# Patient Record
Sex: MALE | Race: WHITE | NOT HISPANIC OR LATINO | Employment: OTHER | URBAN - NONMETROPOLITAN AREA
[De-identification: names, ages, dates, MRNs, and addresses within clinical notes are randomized per-mention and may not be internally consistent; named-entity substitution may affect disease eponyms.]

---

## 2023-05-18 RX ORDER — NIFEDIPINE 90 MG/1
1 TABLET, FILM COATED, EXTENDED RELEASE ORAL DAILY
COMMUNITY
End: 2023-06-26 | Stop reason: SDUPTHER

## 2023-05-18 RX ORDER — CHLORTHALIDONE 25 MG/1
TABLET ORAL
COMMUNITY
Start: 2022-11-21 | End: 2023-05-22 | Stop reason: SDUPTHER

## 2023-05-18 RX ORDER — OMEPRAZOLE 20 MG/1
CAPSULE, DELAYED RELEASE ORAL
COMMUNITY
End: 2023-06-26 | Stop reason: SDUPTHER

## 2023-05-18 RX ORDER — LEVOTHYROXINE SODIUM 175 UG/1
1 TABLET ORAL DAILY
COMMUNITY
Start: 2019-11-29 | End: 2023-06-26 | Stop reason: SDUPTHER

## 2023-05-18 RX ORDER — MULTIVITAMIN
TABLET ORAL
COMMUNITY

## 2023-05-18 RX ORDER — ACETAMINOPHEN 500 MG
TABLET ORAL
COMMUNITY

## 2023-05-18 RX ORDER — CALCITRIOL 0.5 UG/1
CAPSULE ORAL
COMMUNITY
Start: 2019-11-29 | End: 2023-06-26 | Stop reason: SDUPTHER

## 2023-05-18 RX ORDER — SIMVASTATIN 40 MG/1
1 TABLET, FILM COATED ORAL DAILY
COMMUNITY
Start: 2019-11-29 | End: 2023-06-26 | Stop reason: SDUPTHER

## 2023-05-18 RX ORDER — PIOGLITAZONEHYDROCHLORIDE 15 MG/1
1 TABLET ORAL DAILY
COMMUNITY
Start: 2022-11-22

## 2023-05-18 RX ORDER — METFORMIN HYDROCHLORIDE 500 MG/1
2 TABLET, EXTENDED RELEASE ORAL DAILY
COMMUNITY
Start: 2022-02-22 | End: 2023-06-26 | Stop reason: SDUPTHER

## 2023-05-18 RX ORDER — CLOPIDOGREL BISULFATE 75 MG/1
1 TABLET ORAL DAILY
COMMUNITY
Start: 2019-11-29 | End: 2023-06-26 | Stop reason: SDUPTHER

## 2023-05-18 RX ORDER — LOSARTAN POTASSIUM 100 MG/1
1 TABLET ORAL DAILY
COMMUNITY
Start: 2019-11-29 | End: 2023-06-26 | Stop reason: SDUPTHER

## 2023-05-22 ENCOUNTER — OFFICE VISIT (OUTPATIENT)
Dept: PRIMARY CARE | Facility: CLINIC | Age: 73
End: 2023-05-22
Payer: MEDICARE

## 2023-05-22 VITALS
BODY MASS INDEX: 23.7 KG/M2 | DIASTOLIC BLOOD PRESSURE: 80 MMHG | HEIGHT: 69 IN | SYSTOLIC BLOOD PRESSURE: 150 MMHG | WEIGHT: 160 LBS | HEART RATE: 77 BPM

## 2023-05-22 DIAGNOSIS — Z00.00 HEALTHCARE MAINTENANCE: ICD-10-CM

## 2023-05-22 DIAGNOSIS — E11.65 TYPE 2 DIABETES MELLITUS WITH HYPERGLYCEMIA, WITHOUT LONG-TERM CURRENT USE OF INSULIN (MULTI): Primary | ICD-10-CM

## 2023-05-22 DIAGNOSIS — E78.49 OTHER HYPERLIPIDEMIA: ICD-10-CM

## 2023-05-22 DIAGNOSIS — Z12.5 ENCOUNTER FOR SCREENING FOR MALIGNANT NEOPLASM OF PROSTATE: ICD-10-CM

## 2023-05-22 DIAGNOSIS — I10 PRIMARY HYPERTENSION: ICD-10-CM

## 2023-05-22 PROCEDURE — 3077F SYST BP >= 140 MM HG: CPT | Performed by: FAMILY MEDICINE

## 2023-05-22 PROCEDURE — 1036F TOBACCO NON-USER: CPT | Performed by: FAMILY MEDICINE

## 2023-05-22 PROCEDURE — 3079F DIAST BP 80-89 MM HG: CPT | Performed by: FAMILY MEDICINE

## 2023-05-22 PROCEDURE — 1159F MED LIST DOCD IN RCRD: CPT | Performed by: FAMILY MEDICINE

## 2023-05-22 PROCEDURE — 4010F ACE/ARB THERAPY RXD/TAKEN: CPT | Performed by: FAMILY MEDICINE

## 2023-05-22 PROCEDURE — 99214 OFFICE O/P EST MOD 30 MIN: CPT | Performed by: FAMILY MEDICINE

## 2023-05-22 PROCEDURE — 1160F RVW MEDS BY RX/DR IN RCRD: CPT | Performed by: FAMILY MEDICINE

## 2023-05-22 RX ORDER — CHLORTHALIDONE 25 MG/1
25 TABLET ORAL DAILY
Qty: 90 TABLET | Refills: 3 | Status: SHIPPED | OUTPATIENT
Start: 2023-05-22 | End: 2023-06-26 | Stop reason: SDUPTHER

## 2023-05-22 RX ORDER — FERROUS SULFATE 325(65) MG
TABLET ORAL
COMMUNITY

## 2023-05-22 ASSESSMENT — ENCOUNTER SYMPTOMS
SHORTNESS OF BREATH: 0
WEAKNESS: 1
PALPITATIONS: 0

## 2023-05-22 ASSESSMENT — PATIENT HEALTH QUESTIONNAIRE - PHQ9
1. LITTLE INTEREST OR PLEASURE IN DOING THINGS: NOT AT ALL
SUM OF ALL RESPONSES TO PHQ9 QUESTIONS 1 AND 2: 0
2. FEELING DOWN, DEPRESSED OR HOPELESS: NOT AT ALL

## 2023-05-22 NOTE — PROGRESS NOTES
"Subjective   Patient ID: Garcia Haskins is a 72 y.o. male who presents for 6 month.    HPI   istory of Present Illness  HTN   States did not start chlorthalidone   Start may 2023     PVD   no claudication states was able to walk around [employer] slowly without getting tired   Had a home assessment which included PAD testing July 2021.. Left foot significant and Right foot severe, patient is already on Clopidogrel and statin therapy.       Patient is a diabetic he is on Metformin nifedipine statin and clopidogrel.   a1c = 6.7% august 2021   lipid profile LDL 89   checks bg twice  a month 165  no low bs reactions     Care through [facility] but has been over 1 year     Review of Systems   Respiratory:  Negative for shortness of breath.    Cardiovascular:  Negative for chest pain and palpitations.   Neurological:  Positive for weakness (getting tired).   No claudication     Objective   /80   Pulse 77   Ht 1.765 m (5' 9.49\")   Wt 72.6 kg (160 lb)   BMI 23.30 kg/m²     Physical Exam  Vitals reviewed.   Constitutional:       Appearance: Normal appearance.   HENT:      Head: Normocephalic and atraumatic.   Eyes:      Conjunctiva/sclera: Conjunctivae normal.   Cardiovascular:      Rate and Rhythm: Normal rate and regular rhythm.   Pulmonary:      Effort: Pulmonary effort is normal.      Breath sounds: Normal breath sounds.   Musculoskeletal:         General: No deformity.      Cervical back: Neck supple.   Skin:     General: Skin is warm and dry.      Capillary Refill: Capillary refill takes less than 2 seconds.   Neurological:      Mental Status: He is alert.      Sensory: Sensory deficit present.     vibratory    Assessment/Plan   Diagnoses and all orders for this visit:  Healthcare maintenance  -     Prostate Specific Antigen, Screen; Future  Encounter for screening for malignant neoplasm of prostate  -     Prostate Specific Antigen, Screen; Future  Primary hypertension  -     chlorthalidone (Hygroton) 25 mg " tablet; Take 1 tablet (25 mg) by mouth once daily.  Type 2 diabetes mellitus with hyperglycemia, without long-term current use of insulin (CMS/[facility])  -     Albumin , Urine Random; Future  -     Creatinine, Urine Random; Future  -     Hemoglobin A1C; Future  -     Lipid Panel; Future  -     Basic Metabolic Panel; Future  -     CBC; Future  Other hyperlipidemia

## 2023-06-26 ENCOUNTER — OFFICE VISIT (OUTPATIENT)
Dept: PRIMARY CARE | Facility: CLINIC | Age: 73
End: 2023-06-26
Payer: MEDICARE

## 2023-06-26 ENCOUNTER — LAB (OUTPATIENT)
Dept: LAB | Facility: LAB | Age: 73
End: 2023-06-26
Payer: MEDICARE

## 2023-06-26 VITALS
SYSTOLIC BLOOD PRESSURE: 136 MMHG | BODY MASS INDEX: 23.28 KG/M2 | HEIGHT: 69 IN | OXYGEN SATURATION: 100 % | HEART RATE: 80 BPM | DIASTOLIC BLOOD PRESSURE: 60 MMHG | WEIGHT: 157.2 LBS

## 2023-06-26 DIAGNOSIS — N18.31 STAGE 3A CHRONIC KIDNEY DISEASE (MULTI): ICD-10-CM

## 2023-06-26 DIAGNOSIS — R55 VASOVAGAL ATTACK: ICD-10-CM

## 2023-06-26 DIAGNOSIS — E78.49 OTHER HYPERLIPIDEMIA: ICD-10-CM

## 2023-06-26 DIAGNOSIS — H90.12 CONDUCTIVE HEARING LOSS OF LEFT EAR, UNSPECIFIED HEARING STATUS ON CONTRALATERAL SIDE: ICD-10-CM

## 2023-06-26 DIAGNOSIS — Z23 IMMUNIZATION DUE: ICD-10-CM

## 2023-06-26 DIAGNOSIS — Z12.5 ENCOUNTER FOR SCREENING FOR MALIGNANT NEOPLASM OF PROSTATE: ICD-10-CM

## 2023-06-26 DIAGNOSIS — D64.9 ANEMIA, UNSPECIFIED TYPE: Primary | ICD-10-CM

## 2023-06-26 DIAGNOSIS — Z00.00 HEALTHCARE MAINTENANCE: ICD-10-CM

## 2023-06-26 DIAGNOSIS — N18.32 ANEMIA DUE TO STAGE 3B CHRONIC KIDNEY DISEASE (MULTI): Primary | ICD-10-CM

## 2023-06-26 DIAGNOSIS — K21.9 GASTROESOPHAGEAL REFLUX DISEASE, UNSPECIFIED WHETHER ESOPHAGITIS PRESENT: ICD-10-CM

## 2023-06-26 DIAGNOSIS — E03.9 ACQUIRED HYPOTHYROIDISM: ICD-10-CM

## 2023-06-26 DIAGNOSIS — J40 BRONCHITIS: ICD-10-CM

## 2023-06-26 DIAGNOSIS — Z00.00 ROUTINE GENERAL MEDICAL EXAMINATION AT HEALTH CARE FACILITY: Primary | ICD-10-CM

## 2023-06-26 DIAGNOSIS — Z13.6 SCREENING FOR AAA (AORTIC ABDOMINAL ANEURYSM): ICD-10-CM

## 2023-06-26 DIAGNOSIS — E11.65 TYPE 2 DIABETES MELLITUS WITH HYPERGLYCEMIA, WITHOUT LONG-TERM CURRENT USE OF INSULIN (MULTI): ICD-10-CM

## 2023-06-26 DIAGNOSIS — I10 PRIMARY HYPERTENSION: Primary | ICD-10-CM

## 2023-06-26 DIAGNOSIS — N25.81 HYPERPARATHYROIDISM DUE TO RENAL INSUFFICIENCY (MULTI): ICD-10-CM

## 2023-06-26 DIAGNOSIS — D63.1 ANEMIA DUE TO STAGE 3B CHRONIC KIDNEY DISEASE (MULTI): Primary | ICD-10-CM

## 2023-06-26 DIAGNOSIS — I10 PRIMARY HYPERTENSION: ICD-10-CM

## 2023-06-26 PROBLEM — K44.9 DIAPHRAGMATIC HERNIA: Status: ACTIVE | Noted: 2023-06-26

## 2023-06-26 LAB
ANION GAP IN SER/PLAS: 13 MMOL/L (ref 10–20)
CALCIUM (MG/DL) IN SER/PLAS: 8.6 MG/DL (ref 8.6–10.3)
CARBON DIOXIDE, TOTAL (MMOL/L) IN SER/PLAS: 31 MMOL/L (ref 21–32)
CHLORIDE (MMOL/L) IN SER/PLAS: 93 MMOL/L (ref 98–107)
CHOLESTEROL (MG/DL) IN SER/PLAS: 148 MG/DL (ref 0–199)
CHOLESTEROL IN HDL (MG/DL) IN SER/PLAS: 41 MG/DL
CHOLESTEROL/HDL RATIO: 3.6
COBALAMIN (VITAMIN B12) (PG/ML) IN SER/PLAS: 422 PG/ML (ref 211–911)
CREATININE (MG/DL) IN SER/PLAS: 1.88 MG/DL (ref 0.5–1.3)
ERYTHROCYTE DISTRIBUTION WIDTH (RATIO) BY AUTOMATED COUNT: 15.1 % (ref 11.5–14.5)
ERYTHROCYTE MEAN CORPUSCULAR HEMOGLOBIN CONCENTRATION (G/DL) BY AUTOMATED: 31.9 G/DL (ref 32–36)
ERYTHROCYTE MEAN CORPUSCULAR VOLUME (FL) BY AUTOMATED COUNT: 79 FL (ref 80–100)
ERYTHROCYTES (10*6/UL) IN BLOOD BY AUTOMATED COUNT: 3.87 X10E12/L (ref 4.5–5.9)
ESTIMATED AVERAGE GLUCOSE FOR HBA1C: 183 MG/DL
FERRITIN (UG/LL) IN SER/PLAS: 248 UG/L (ref 20–300)
GFR MALE: 37 ML/MIN/1.73M2
GLUCOSE (MG/DL) IN SER/PLAS: 192 MG/DL (ref 74–99)
HEMATOCRIT (%) IN BLOOD BY AUTOMATED COUNT: 30.4 % (ref 41–52)
HEMOGLOBIN (G/DL) IN BLOOD: 9.7 G/DL (ref 13.5–17.5)
HEMOGLOBIN A1C/HEMOGLOBIN TOTAL IN BLOOD: 8 %
IRON (UG/DL) IN SER/PLAS: 72 UG/DL (ref 35–150)
IRON BINDING CAPACITY (UG/DL) IN SER/PLAS: 257 UG/DL (ref 240–445)
IRON SATURATION (%) IN SER/PLAS: 28 % (ref 25–45)
LDL: 74 MG/DL (ref 0–99)
LEUKOCYTES (10*3/UL) IN BLOOD BY AUTOMATED COUNT: 11.3 X10E9/L (ref 4.4–11.3)
PLATELETS (10*3/UL) IN BLOOD AUTOMATED COUNT: 465 X10E9/L (ref 150–450)
POTASSIUM (MMOL/L) IN SER/PLAS: 3.3 MMOL/L (ref 3.5–5.3)
PROSTATE SPECIFIC ANTIGEN,SCREEN: 1.99 NG/ML (ref 0–4)
SODIUM (MMOL/L) IN SER/PLAS: 134 MMOL/L (ref 136–145)
TRIGLYCERIDE (MG/DL) IN SER/PLAS: 163 MG/DL (ref 0–149)
UREA NITROGEN (MG/DL) IN SER/PLAS: 30 MG/DL (ref 6–23)
VLDL: 33 MG/DL (ref 0–40)

## 2023-06-26 PROCEDURE — 99214 OFFICE O/P EST MOD 30 MIN: CPT | Performed by: FAMILY MEDICINE

## 2023-06-26 PROCEDURE — 1170F FXNL STATUS ASSESSED: CPT | Performed by: FAMILY MEDICINE

## 2023-06-26 PROCEDURE — 36415 COLL VENOUS BLD VENIPUNCTURE: CPT

## 2023-06-26 PROCEDURE — 85027 COMPLETE CBC AUTOMATED: CPT

## 2023-06-26 PROCEDURE — 83036 HEMOGLOBIN GLYCOSYLATED A1C: CPT

## 2023-06-26 PROCEDURE — G0439 PPPS, SUBSEQ VISIT: HCPCS | Performed by: FAMILY MEDICINE

## 2023-06-26 PROCEDURE — 90677 PCV20 VACCINE IM: CPT | Performed by: FAMILY MEDICINE

## 2023-06-26 PROCEDURE — 80061 LIPID PANEL: CPT

## 2023-06-26 PROCEDURE — 80048 BASIC METABOLIC PNL TOTAL CA: CPT

## 2023-06-26 PROCEDURE — 83550 IRON BINDING TEST: CPT

## 2023-06-26 PROCEDURE — G0009 ADMIN PNEUMOCOCCAL VACCINE: HCPCS | Performed by: FAMILY MEDICINE

## 2023-06-26 PROCEDURE — 82728 ASSAY OF FERRITIN: CPT

## 2023-06-26 PROCEDURE — 82607 VITAMIN B-12: CPT

## 2023-06-26 PROCEDURE — G0103 PSA SCREENING: HCPCS

## 2023-06-26 PROCEDURE — 3075F SYST BP GE 130 - 139MM HG: CPT | Performed by: FAMILY MEDICINE

## 2023-06-26 PROCEDURE — 3078F DIAST BP <80 MM HG: CPT | Performed by: FAMILY MEDICINE

## 2023-06-26 PROCEDURE — 1160F RVW MEDS BY RX/DR IN RCRD: CPT | Performed by: FAMILY MEDICINE

## 2023-06-26 PROCEDURE — 83540 ASSAY OF IRON: CPT

## 2023-06-26 PROCEDURE — 1159F MED LIST DOCD IN RCRD: CPT | Performed by: FAMILY MEDICINE

## 2023-06-26 PROCEDURE — 4010F ACE/ARB THERAPY RXD/TAKEN: CPT | Performed by: FAMILY MEDICINE

## 2023-06-26 PROCEDURE — 1036F TOBACCO NON-USER: CPT | Performed by: FAMILY MEDICINE

## 2023-06-26 RX ORDER — LOSARTAN POTASSIUM 100 MG/1
100 TABLET ORAL DAILY
Qty: 90 TABLET | Refills: 3 | Status: SHIPPED | OUTPATIENT
Start: 2023-06-26 | End: 2024-05-13 | Stop reason: SDUPTHER

## 2023-06-26 RX ORDER — OMEPRAZOLE 20 MG/1
20 CAPSULE, DELAYED RELEASE ORAL DAILY
Qty: 90 CAPSULE | Refills: 3 | Status: SHIPPED | OUTPATIENT
Start: 2023-06-26 | End: 2024-05-13 | Stop reason: SDUPTHER

## 2023-06-26 RX ORDER — CLOPIDOGREL BISULFATE 75 MG/1
75 TABLET ORAL DAILY
Qty: 90 TABLET | Refills: 3 | Status: SHIPPED | OUTPATIENT
Start: 2023-06-26 | End: 2024-05-13 | Stop reason: SDUPTHER

## 2023-06-26 RX ORDER — POTASSIUM CHLORIDE 750 MG/1
10 TABLET, FILM COATED, EXTENDED RELEASE ORAL DAILY
Qty: 90 TABLET | Refills: 1 | Status: SHIPPED | OUTPATIENT
Start: 2023-06-26 | End: 2024-06-25

## 2023-06-26 RX ORDER — AZITHROMYCIN 250 MG/1
TABLET, FILM COATED ORAL
Qty: 6 TABLET | Refills: 0 | Status: SHIPPED | OUTPATIENT
Start: 2023-06-26 | End: 2023-07-01

## 2023-06-26 RX ORDER — CALCITRIOL 0.5 UG/1
0.5 CAPSULE ORAL 3 TIMES WEEKLY
Qty: 36 CAPSULE | Refills: 3 | Status: SHIPPED | OUTPATIENT
Start: 2023-06-26 | End: 2023-12-26 | Stop reason: ALTCHOICE

## 2023-06-26 RX ORDER — CHLORTHALIDONE 25 MG/1
25 TABLET ORAL DAILY
Qty: 90 TABLET | Refills: 3 | Status: SHIPPED | OUTPATIENT
Start: 2023-06-26 | End: 2023-12-26 | Stop reason: ALTCHOICE

## 2023-06-26 RX ORDER — SIMVASTATIN 40 MG/1
40 TABLET, FILM COATED ORAL DAILY
Qty: 90 TABLET | Refills: 3 | Status: SHIPPED | OUTPATIENT
Start: 2023-06-26 | End: 2024-05-13 | Stop reason: SDUPTHER

## 2023-06-26 RX ORDER — NIFEDIPINE 90 MG/1
90 TABLET, FILM COATED, EXTENDED RELEASE ORAL DAILY
Qty: 90 TABLET | Refills: 3 | Status: SHIPPED | OUTPATIENT
Start: 2023-06-26 | End: 2023-12-21 | Stop reason: SDUPTHER

## 2023-06-26 RX ORDER — METFORMIN HYDROCHLORIDE 500 MG/1
1000 TABLET, EXTENDED RELEASE ORAL DAILY
Qty: 180 TABLET | Refills: 3 | Status: SHIPPED | OUTPATIENT
Start: 2023-06-26 | End: 2024-06-25

## 2023-06-26 RX ORDER — LEVOTHYROXINE SODIUM 175 UG/1
175 TABLET ORAL DAILY
Qty: 90 TABLET | Refills: 3 | Status: SHIPPED | OUTPATIENT
Start: 2023-06-26 | End: 2024-05-13 | Stop reason: SDUPTHER

## 2023-06-26 ASSESSMENT — ACTIVITIES OF DAILY LIVING (ADL)
MANAGING_FINANCES: INDEPENDENT
DRESSING: INDEPENDENT
GROCERY_SHOPPING: INDEPENDENT
TAKING_MEDICATION: INDEPENDENT
DOING_HOUSEWORK: INDEPENDENT
BATHING: INDEPENDENT

## 2023-06-26 ASSESSMENT — PATIENT HEALTH QUESTIONNAIRE - PHQ9
1. LITTLE INTEREST OR PLEASURE IN DOING THINGS: NOT AT ALL
2. FEELING DOWN, DEPRESSED OR HOPELESS: NOT AT ALL
SUM OF ALL RESPONSES TO PHQ9 QUESTIONS 1 AND 2: 0

## 2023-06-26 NOTE — PROGRESS NOTES
Subjective   Reason for Visit: Garcia Haskins is an 72 y.o. male here for a Medicare Wellness visit.     Past Medical, Surgical, and Family History reviewed and updated in chart.    Reviewed all medications by prescribing practitioner or clinical pharmacist (such as prescriptions, OTCs, herbal therapies and supplements) and documented in the medical record.    HPI    Ckd 3a      Labs today     nocturia up  to 6   Flomax did not help and does not want to see urology     HTN  good controll with medications     PVD   2022 PAD test borderline/normal    patient is already on Clopidogrel and statin therapy.     Patient is a diabetic he is on Metformin   a1c pending  Last bs was 199 about 2 hours aftger dinner   last eye exam more than 2 yr ago     Former smoker wuit 2002   No longer going to [facility]    H/o cva on plavix     Unable to tolerate asa gi ulcer    Pneumovax 23: Pneumovax 23 vaccine was previously given and 1/29/2019.   Prevnar 13: the patient declines the Prevnar 13 vaccination.   Shingles Vaccine: the patient declines the Shingles vaccination.   Prostate cancer screening: Screening ordered.   Colorectal Cancer Screening: screening is current and 05/26/2021.   Abdominal Aortic Aneurysm screening: Screening ordered.    Patient Care Team:  Ben Disa MD as PCP - General  Ben Dias MD as PCP - [employer] PCP     Review of Systems  Ears plugged on the left  Coughing productive yellow x 2 days no sob no cp     Taking coiricidin      No fever  Sleeping during the day less at night  Low energy     After p20 pt got lightheaded at check out window and bp 90/50  Bs 234   Pt had been fasting for labs  Diaphoretic   Pt taken to exam room and layed down   120/50  Pulse ox normal  Heart rate normal   Pt drank 2 oz gatorade and felt better   Needed to go to bathroom and assisted to bathroom     BM  normal no melena   Pt able to go home   Came with a   Will call with labs   Recheck bp 130/54  "  Pt able to walk to care without assistance     Objective   Vitals:  /60   Pulse 80   Ht 1.765 m (5' 9.49\")   Wt 71.3 kg (157 lb 3.2 oz)   SpO2 100%   BMI 22.89 kg/m²       Physical Exam  Constitutional:       Appearance: Normal appearance.   HENT:      Head: Normocephalic and atraumatic.      Right Ear: Tympanic membrane, ear canal and external ear normal. There is no impacted cerumen.      Left Ear: Ear canal and external ear normal.      Ears:      Comments: Left tm does not look normal cunningham localizes to the left and bc =ac on the left   Eyes:      Conjunctiva/sclera: Conjunctivae normal.      Pupils: Pupils are equal, round, and reactive to light.   Cardiovascular:      Rate and Rhythm: Normal rate and regular rhythm.      Heart sounds: Normal heart sounds.   Pulmonary:      Effort: Pulmonary effort is normal.      Breath sounds: Normal breath sounds.   Lymphadenopathy:      Cervical: No cervical adenopathy.   Skin:     Coloration: Skin is not jaundiced.   Neurological:      General: No focal deficit present.      Mental Status: He is alert and oriented to person, place, and time.   Psychiatric:         Mood and Affect: Mood normal.         Behavior: Behavior normal.         Thought Content: Thought content normal.         Judgment: Judgment normal.         Assessment/Plan   Problem List Items Addressed This Visit       Type 2 diabetes mellitus with hyperglycemia, without long-term current use of insulin (CMS/[facility])    Relevant Medications    metFORMIN XR (Glucophage-XR) 500 mg 24 hr tablet    Primary hypertension    Relevant Medications    chlorthalidone (Hygroton) 25 mg tablet    clopidogrel (Plavix) 75 mg tablet    losartan (Cozaar) 100 mg tablet    NIFEdipine CC (Adalat CC) 90 mg 24 hr tablet    Other hyperlipidemia    Relevant Medications    simvastatin (Zocor) 40 mg tablet    Acquired hypothyroidism    Relevant Medications    levothyroxine (Synthroid, Levoxyl) 175 mcg tablet    " Hyperparathyroidism due to renal insufficiency (CMS/[facility])    Stage 3a chronic kidney disease     Other Visit Diagnoses       Routine general medical examination at health care facility    -  Primary    Gastroesophageal reflux disease, unspecified whether esophagitis present        Relevant Medications    omeprazole (PriLOSEC) 20 mg DR capsule    Healthcare maintenance        Relevant Medications    calcitriol (Rocaltrol) 0.5 mcg capsule    Conductive hearing loss of left ear, unspecified hearing status on contralateral side        Relevant Orders    Referral to ENT    Screening for AAA (aortic abdominal aneurysm)        Relevant Orders    Vascular US abdominal aorta anuerysm AAA screening    Bronchitis        Relevant Medications    azithromycin (Zithromax) 250 mg tablet    Immunization due        Relevant Orders    Pneumococcal conjugate vaccine, 20-valent, adult (PREVNAR 20) (Completed)    Vasovagal attack

## 2023-07-18 ENCOUNTER — LAB (OUTPATIENT)
Dept: LAB | Facility: LAB | Age: 73
End: 2023-07-18
Payer: MEDICARE

## 2023-07-18 DIAGNOSIS — E11.65 TYPE 2 DIABETES MELLITUS WITH HYPERGLYCEMIA, WITHOUT LONG-TERM CURRENT USE OF INSULIN (MULTI): ICD-10-CM

## 2023-07-18 PROCEDURE — 82570 ASSAY OF URINE CREATININE: CPT

## 2023-07-18 PROCEDURE — 82043 UR ALBUMIN QUANTITATIVE: CPT

## 2023-07-19 LAB
ALBUMIN (MG/L) IN URINE: 112.3 MG/L
ALBUMIN/CREATININE (UG/MG) IN URINE: 118.8 UG/MG CRT (ref 0–30)
CREATININE (MG/DL) IN URINE: 94.5 MG/DL (ref 20–370)

## 2023-08-29 LAB
ALANINE AMINOTRANSFERASE (SGPT) (U/L) IN SER/PLAS: 27 U/L (ref 10–52)
ALBUMIN (G/DL) IN SER/PLAS: 4 G/DL (ref 3.4–5)
ALKALINE PHOSPHATASE (U/L) IN SER/PLAS: 214 U/L (ref 33–136)
ANION GAP IN SER/PLAS: 14 MMOL/L (ref 10–20)
APPEARANCE, URINE: CLEAR
ASPARTATE AMINOTRANSFERASE (SGOT) (U/L) IN SER/PLAS: 25 U/L (ref 9–39)
BILIRUBIN TOTAL (MG/DL) IN SER/PLAS: 0.4 MG/DL (ref 0–1.2)
BILIRUBIN, URINE: NEGATIVE
BLOOD, URINE: NEGATIVE
CALCIUM (MG/DL) IN SER/PLAS: 8.3 MG/DL (ref 8.6–10.3)
CARBON DIOXIDE, TOTAL (MMOL/L) IN SER/PLAS: 28 MMOL/L (ref 21–32)
CHLORIDE (MMOL/L) IN SER/PLAS: 97 MMOL/L (ref 98–107)
COLOR, URINE: YELLOW
CREATININE (MG/DL) IN SER/PLAS: 1.81 MG/DL (ref 0.5–1.3)
ERYTHROCYTE DISTRIBUTION WIDTH (RATIO) BY AUTOMATED COUNT: NORMAL
ERYTHROCYTE MEAN CORPUSCULAR HEMOGLOBIN CONCENTRATION (G/DL) BY AUTOMATED: NORMAL
ERYTHROCYTE MEAN CORPUSCULAR VOLUME (FL) BY AUTOMATED COUNT: NORMAL
ERYTHROCYTES (10*6/UL) IN BLOOD BY AUTOMATED COUNT: NORMAL
GFR MALE: 39 ML/MIN/1.73M2
GLUCOSE (MG/DL) IN SER/PLAS: 158 MG/DL (ref 74–99)
GLUCOSE, URINE: ABNORMAL MG/DL
HEMATOCRIT (%) IN BLOOD BY AUTOMATED COUNT: NORMAL
HEMOGLOBIN (G/DL) IN BLOOD: NORMAL
HYALINE CASTS, URINE: ABNORMAL /LPF
KETONES, URINE: NEGATIVE MG/DL
LEUKOCYTE ESTERASE, URINE: NEGATIVE
LEUKOCYTES (10*3/UL) IN BLOOD BY AUTOMATED COUNT: NORMAL
MAGNESIUM (MG/DL) IN SER/PLAS: 2.1 MG/DL (ref 1.6–2.4)
NITRITE, URINE: NEGATIVE
PH, URINE: 6 (ref 5–8)
PHOSPHATE (MG/DL) IN SER/PLAS: 3.9 MG/DL (ref 2.5–4.9)
PLATELETS (10*3/UL) IN BLOOD AUTOMATED COUNT: NORMAL
POTASSIUM (MMOL/L) IN SER/PLAS: 3.8 MMOL/L (ref 3.5–5.3)
PROTEIN TOTAL: 7.2 G/DL (ref 6.4–8.2)
PROTEIN, URINE: ABNORMAL MG/DL
RBC, URINE: <1 /HPF (ref 0–5)
SODIUM (MMOL/L) IN SER/PLAS: 135 MMOL/L (ref 136–145)
SPECIFIC GRAVITY, URINE: 1.01 (ref 1–1.03)
URATE (MG/DL) IN SER/PLAS: 4.8 MG/DL (ref 4–7.5)
UREA NITROGEN (MG/DL) IN SER/PLAS: 40 MG/DL (ref 6–23)
UROBILINOGEN, URINE: <2 MG/DL (ref 0–1.9)
WBC, URINE: 1 /HPF (ref 0–5)

## 2023-08-30 LAB
ALBUMIN (MG/L) IN URINE: 145.2 MG/L
ALBUMIN/CREATININE (UG/MG) IN URINE: 145.3 UG/MG CRT (ref 0–30)
CREATININE (MG/DL) IN URINE: 99.9 MG/DL (ref 20–370)
PARATHYRIN INTACT (PG/ML) IN SER/PLAS: 14.6 PG/ML (ref 18.5–88)

## 2023-10-07 DIAGNOSIS — D63.1 ANEMIA OF CHRONIC RENAL FAILURE, STAGE 3 (MODERATE), UNSPECIFIED WHETHER STAGE 3A OR 3B CKD (MULTI): ICD-10-CM

## 2023-10-07 DIAGNOSIS — N18.30 ANEMIA OF CHRONIC RENAL FAILURE, STAGE 3 (MODERATE), UNSPECIFIED WHETHER STAGE 3A OR 3B CKD (MULTI): ICD-10-CM

## 2023-10-10 ENCOUNTER — LAB (OUTPATIENT)
Dept: LAB | Facility: LAB | Age: 73
End: 2023-10-10
Payer: MEDICARE

## 2023-10-10 ENCOUNTER — APPOINTMENT (OUTPATIENT)
Dept: LAB | Facility: LAB | Age: 73
End: 2023-10-10
Payer: MEDICARE

## 2023-10-10 DIAGNOSIS — N18.30 STAGE 3 CHRONIC KIDNEY DISEASE, UNSPECIFIED WHETHER STAGE 3A OR 3B CKD (MULTI): ICD-10-CM

## 2023-10-10 DIAGNOSIS — D63.1 ANEMIA OF CHRONIC RENAL FAILURE, STAGE 3 (MODERATE), UNSPECIFIED WHETHER STAGE 3A OR 3B CKD (MULTI): ICD-10-CM

## 2023-10-10 DIAGNOSIS — N18.30 ANEMIA OF CHRONIC RENAL FAILURE, STAGE 3 (MODERATE), UNSPECIFIED WHETHER STAGE 3A OR 3B CKD (MULTI): ICD-10-CM

## 2023-10-10 PROBLEM — I10 HTN (HYPERTENSION): Status: ACTIVE | Noted: 2023-10-10

## 2023-10-10 PROBLEM — Z86.73 HISTORY OF STROKE WITHOUT RESIDUAL DEFICITS: Status: ACTIVE | Noted: 2023-10-10

## 2023-10-10 PROBLEM — I10 BENIGN HYPERTENSION: Status: ACTIVE | Noted: 2023-10-10

## 2023-10-10 PROBLEM — E11.9 DIABETES MELLITUS (MULTI): Status: ACTIVE | Noted: 2023-10-10

## 2023-10-10 PROBLEM — E78.5 HYPERLIPIDEMIA: Status: ACTIVE | Noted: 2023-10-10

## 2023-10-10 PROBLEM — K30 INDIGESTION: Status: ACTIVE | Noted: 2023-10-10

## 2023-10-10 PROBLEM — D64.9 ANEMIA: Status: ACTIVE | Noted: 2023-10-10

## 2023-10-10 PROBLEM — E78.00 HYPERCHOLESTEROLEMIA: Status: ACTIVE | Noted: 2023-10-10

## 2023-10-10 PROBLEM — D63.8 ANEMIA OF CHRONIC DISORDER: Status: ACTIVE | Noted: 2023-10-10

## 2023-10-10 PROBLEM — E11.65 CONTROLLED TYPE 2 DIABETES MELLITUS WITH HYPERGLYCEMIA, WITHOUT LONG-TERM CURRENT USE OF INSULIN (MULTI): Status: ACTIVE | Noted: 2023-10-10

## 2023-10-10 PROBLEM — K63.5 BENIGN COLONIC POLYP: Status: ACTIVE | Noted: 2023-10-10

## 2023-10-10 PROBLEM — N18.9 CKD (CHRONIC KIDNEY DISEASE): Status: ACTIVE | Noted: 2023-10-10

## 2023-10-10 PROBLEM — R35.1 NOCTURIA: Status: ACTIVE | Noted: 2023-10-10

## 2023-10-10 PROBLEM — G47.30 SLEEP APNEA: Status: ACTIVE | Noted: 2023-10-10

## 2023-10-10 PROBLEM — I73.9 PERIPHERAL VASCULAR DISEASE (CMS-HCC): Status: ACTIVE | Noted: 2023-10-10

## 2023-10-10 PROBLEM — E03.9 HYPOTHYROIDISM: Status: ACTIVE | Noted: 2023-10-10

## 2023-10-10 PROBLEM — E20.9 HYPOPARATHYROIDISM (MULTI): Status: ACTIVE | Noted: 2023-10-10

## 2023-10-10 LAB
ALBUMIN SERPL BCP-MCNC: 3.9 G/DL (ref 3.4–5)
ALP SERPL-CCNC: 280 U/L (ref 33–136)
ALT SERPL W P-5'-P-CCNC: 25 U/L (ref 10–52)
ANION GAP SERPL CALC-SCNC: 15 MMOL/L (ref 10–20)
AST SERPL W P-5'-P-CCNC: 24 U/L (ref 9–39)
BILIRUB SERPL-MCNC: 0.3 MG/DL (ref 0–1.2)
BUN SERPL-MCNC: 32 MG/DL (ref 6–23)
CALCIUM SERPL-MCNC: 6.9 MG/DL (ref 8.6–10.3)
CHLORIDE SERPL-SCNC: 103 MMOL/L (ref 98–107)
CO2 SERPL-SCNC: 25 MMOL/L (ref 21–32)
CREAT SERPL-MCNC: 1.64 MG/DL (ref 0.5–1.3)
GFR SERPL CREATININE-BSD FRML MDRD: 44 ML/MIN/1.73M*2
GLUCOSE SERPL-MCNC: 201 MG/DL (ref 74–99)
POTASSIUM SERPL-SCNC: 4.2 MMOL/L (ref 3.5–5.3)
PROT SERPL-MCNC: 7.5 G/DL (ref 6.4–8.2)
SODIUM SERPL-SCNC: 139 MMOL/L (ref 136–145)

## 2023-10-10 PROCEDURE — 36415 COLL VENOUS BLD VENIPUNCTURE: CPT

## 2023-10-10 PROCEDURE — 80053 COMPREHEN METABOLIC PANEL: CPT

## 2023-10-10 RX ORDER — PRENATAL VIT CALC,IRON,FOLIC
1 TABLET ORAL DAILY
COMMUNITY
Start: 2020-02-05

## 2023-10-10 RX ORDER — CALCIUM CARBONATE 500(1250)
2 TABLET ORAL DAILY PRN
COMMUNITY
Start: 2020-02-05

## 2023-10-10 RX ORDER — EMPAGLIFLOZIN 10 MG/1
TABLET, FILM COATED ORAL
COMMUNITY
Start: 2023-09-06 | End: 2023-12-18 | Stop reason: SDUPTHER

## 2023-10-11 ENCOUNTER — APPOINTMENT (OUTPATIENT)
Dept: HEMATOLOGY/ONCOLOGY | Facility: CLINIC | Age: 73
End: 2023-10-11
Payer: MEDICARE

## 2023-10-11 ENCOUNTER — OFFICE VISIT (OUTPATIENT)
Dept: HEMATOLOGY/ONCOLOGY | Facility: CLINIC | Age: 73
End: 2023-10-11
Payer: MEDICARE

## 2023-10-11 ENCOUNTER — INFUSION (OUTPATIENT)
Dept: HEMATOLOGY/ONCOLOGY | Facility: CLINIC | Age: 73
End: 2023-10-11
Payer: MEDICARE

## 2023-10-11 VITALS
HEIGHT: 69 IN | WEIGHT: 160.94 LBS | SYSTOLIC BLOOD PRESSURE: 166 MMHG | BODY MASS INDEX: 23.84 KG/M2 | RESPIRATION RATE: 18 BRPM | OXYGEN SATURATION: 100 % | HEART RATE: 64 BPM | DIASTOLIC BLOOD PRESSURE: 61 MMHG

## 2023-10-11 DIAGNOSIS — N18.30 STAGE 3 CHRONIC KIDNEY DISEASE, UNSPECIFIED WHETHER STAGE 3A OR 3B CKD (MULTI): Primary | ICD-10-CM

## 2023-10-11 DIAGNOSIS — N18.30 STAGE 3 CHRONIC KIDNEY DISEASE, UNSPECIFIED WHETHER STAGE 3A OR 3B CKD (MULTI): ICD-10-CM

## 2023-10-11 DIAGNOSIS — D64.9 ANEMIA, UNSPECIFIED TYPE: ICD-10-CM

## 2023-10-11 LAB
ALBUMIN SERPL BCP-MCNC: 4 G/DL (ref 3.4–5)
ANION GAP SERPL CALC-SCNC: 12 MMOL/L (ref 10–20)
BUN SERPL-MCNC: 29 MG/DL (ref 6–23)
CALCIUM SERPL-MCNC: 6.6 MG/DL (ref 8.6–10.3)
CHLORIDE SERPL-SCNC: 102 MMOL/L (ref 98–107)
CO2 SERPL-SCNC: 28 MMOL/L (ref 21–32)
CREAT SERPL-MCNC: 1.85 MG/DL (ref 0.5–1.3)
ERYTHROCYTE [DISTWIDTH] IN BLOOD BY AUTOMATED COUNT: 15.5 % (ref 11.5–14.5)
FERRITIN SERPL-MCNC: 37 NG/ML (ref 20–300)
FERRITIN SERPL-MCNC: 42 NG/ML (ref 20–300)
GFR SERPL CREATININE-BSD FRML MDRD: 38 ML/MIN/1.73M*2
GLUCOSE SERPL-MCNC: 122 MG/DL (ref 74–99)
HCT VFR BLD AUTO: 34.3 % (ref 41–52)
HGB BLD-MCNC: 10.5 G/DL (ref 13.5–17.5)
IRON SATN MFR SERPL: 9 % (ref 25–45)
IRON SATN MFR SERPL: 9 % (ref 25–45)
IRON SERPL-MCNC: 29 UG/DL (ref 35–150)
IRON SERPL-MCNC: 29 UG/DL (ref 35–150)
MCH RBC QN AUTO: 23.2 PG (ref 26–34)
MCHC RBC AUTO-ENTMCNC: 30.6 G/DL (ref 32–36)
MCV RBC AUTO: 76 FL (ref 80–100)
NRBC BLD-RTO: 0 /100 WBCS (ref 0–0)
PHOSPHATE SERPL-MCNC: 4.3 MG/DL (ref 2.5–4.9)
PLATELET # BLD AUTO: 398 X10*3/UL (ref 150–450)
PMV BLD AUTO: 9.7 FL (ref 7.5–11.5)
POTASSIUM SERPL-SCNC: 4.2 MMOL/L (ref 3.5–5.3)
RBC # BLD AUTO: 4.52 X10*6/UL (ref 4.5–5.9)
SODIUM SERPL-SCNC: 138 MMOL/L (ref 136–145)
TIBC SERPL-MCNC: 320 UG/DL (ref 240–445)
TIBC SERPL-MCNC: 321 UG/DL (ref 240–445)
UIBC SERPL-MCNC: 291 UG/DL (ref 110–370)
UIBC SERPL-MCNC: 292 UG/DL (ref 110–370)
VIT B12 SERPL-MCNC: 459 PG/ML (ref 211–911)
WBC # BLD AUTO: 8.1 X10*3/UL (ref 4.4–11.3)

## 2023-10-11 PROCEDURE — 85027 COMPLETE CBC AUTOMATED: CPT | Performed by: INTERNAL MEDICINE

## 2023-10-11 PROCEDURE — 4010F ACE/ARB THERAPY RXD/TAKEN: CPT | Performed by: INTERNAL MEDICINE

## 2023-10-11 PROCEDURE — 99214 OFFICE O/P EST MOD 30 MIN: CPT | Performed by: INTERNAL MEDICINE

## 2023-10-11 PROCEDURE — 2500000004 HC RX 250 GENERAL PHARMACY W/ HCPCS (ALT 636 FOR OP/ED): Mod: JZ | Performed by: INTERNAL MEDICINE

## 2023-10-11 PROCEDURE — 96372 THER/PROPH/DIAG INJ SC/IM: CPT

## 2023-10-11 PROCEDURE — 80069 RENAL FUNCTION PANEL: CPT | Performed by: INTERNAL MEDICINE

## 2023-10-11 PROCEDURE — 3078F DIAST BP <80 MM HG: CPT | Performed by: INTERNAL MEDICINE

## 2023-10-11 PROCEDURE — 1160F RVW MEDS BY RX/DR IN RCRD: CPT | Performed by: INTERNAL MEDICINE

## 2023-10-11 PROCEDURE — 82728 ASSAY OF FERRITIN: CPT | Performed by: INTERNAL MEDICINE

## 2023-10-11 PROCEDURE — 36415 COLL VENOUS BLD VENIPUNCTURE: CPT | Performed by: INTERNAL MEDICINE

## 2023-10-11 PROCEDURE — 83550 IRON BINDING TEST: CPT | Performed by: INTERNAL MEDICINE

## 2023-10-11 PROCEDURE — 82607 VITAMIN B-12: CPT | Performed by: INTERNAL MEDICINE

## 2023-10-11 PROCEDURE — 82668 ASSAY OF ERYTHROPOIETIN: CPT | Performed by: INTERNAL MEDICINE

## 2023-10-11 PROCEDURE — 1126F AMNT PAIN NOTED NONE PRSNT: CPT | Performed by: INTERNAL MEDICINE

## 2023-10-11 PROCEDURE — 1036F TOBACCO NON-USER: CPT | Performed by: INTERNAL MEDICINE

## 2023-10-11 PROCEDURE — 1159F MED LIST DOCD IN RCRD: CPT | Performed by: INTERNAL MEDICINE

## 2023-10-11 PROCEDURE — 3052F HG A1C>EQUAL 8.0%<EQUAL 9.0%: CPT | Performed by: INTERNAL MEDICINE

## 2023-10-11 PROCEDURE — 3077F SYST BP >= 140 MM HG: CPT | Performed by: INTERNAL MEDICINE

## 2023-10-11 PROCEDURE — 96372 THER/PROPH/DIAG INJ SC/IM: CPT | Mod: JZ | Performed by: INTERNAL MEDICINE

## 2023-10-11 PROCEDURE — 83550 IRON BINDING TEST: CPT | Mod: 59 | Performed by: INTERNAL MEDICINE

## 2023-10-11 RX ORDER — EPINEPHRINE 0.3 MG/.3ML
0.3 INJECTION SUBCUTANEOUS EVERY 5 MIN PRN
Status: CANCELLED | OUTPATIENT
Start: 2023-10-11

## 2023-10-11 RX ORDER — DIPHENHYDRAMINE HYDROCHLORIDE 50 MG/ML
50 INJECTION INTRAMUSCULAR; INTRAVENOUS AS NEEDED
Status: DISCONTINUED | OUTPATIENT
Start: 2023-10-11 | End: 2024-01-04 | Stop reason: HOSPADM

## 2023-10-11 RX ORDER — METHYLPREDNISOLONE SODIUM SUCCINATE 40 MG/ML
40 INJECTION INTRAMUSCULAR; INTRAVENOUS AS NEEDED
Status: CANCELLED | OUTPATIENT
Start: 2023-10-11

## 2023-10-11 RX ORDER — ALBUTEROL SULFATE 0.83 MG/ML
3 SOLUTION RESPIRATORY (INHALATION) AS NEEDED
Status: CANCELLED | OUTPATIENT
Start: 2023-10-25

## 2023-10-11 RX ORDER — DIPHENHYDRAMINE HYDROCHLORIDE 50 MG/ML
50 INJECTION INTRAMUSCULAR; INTRAVENOUS AS NEEDED
Status: CANCELLED | OUTPATIENT
Start: 2023-10-11

## 2023-10-11 RX ORDER — FAMOTIDINE 10 MG/ML
20 INJECTION INTRAVENOUS ONCE AS NEEDED
Status: DISCONTINUED | OUTPATIENT
Start: 2023-10-11 | End: 2024-01-04 | Stop reason: HOSPADM

## 2023-10-11 RX ORDER — EPINEPHRINE 0.3 MG/.3ML
0.3 INJECTION SUBCUTANEOUS EVERY 5 MIN PRN
Status: DISCONTINUED | OUTPATIENT
Start: 2023-10-11 | End: 2024-01-04 | Stop reason: HOSPADM

## 2023-10-11 RX ORDER — FAMOTIDINE 10 MG/ML
20 INJECTION INTRAVENOUS ONCE AS NEEDED
Status: CANCELLED | OUTPATIENT
Start: 2023-10-11

## 2023-10-11 RX ORDER — DIPHENHYDRAMINE HYDROCHLORIDE 50 MG/ML
50 INJECTION INTRAMUSCULAR; INTRAVENOUS AS NEEDED
Status: CANCELLED | OUTPATIENT
Start: 2023-10-25

## 2023-10-11 RX ORDER — FAMOTIDINE 10 MG/ML
20 INJECTION INTRAVENOUS ONCE AS NEEDED
Status: CANCELLED | OUTPATIENT
Start: 2023-10-25

## 2023-10-11 RX ORDER — ALBUTEROL SULFATE 0.83 MG/ML
3 SOLUTION RESPIRATORY (INHALATION) AS NEEDED
Status: DISCONTINUED | OUTPATIENT
Start: 2023-10-11 | End: 2024-01-04 | Stop reason: HOSPADM

## 2023-10-11 RX ORDER — EPINEPHRINE 0.3 MG/.3ML
0.3 INJECTION SUBCUTANEOUS EVERY 5 MIN PRN
Status: CANCELLED | OUTPATIENT
Start: 2023-10-25

## 2023-10-11 RX ORDER — ALBUTEROL SULFATE 0.83 MG/ML
3 SOLUTION RESPIRATORY (INHALATION) AS NEEDED
Status: CANCELLED | OUTPATIENT
Start: 2023-10-11

## 2023-10-11 RX ADMIN — DARBEPOETIN ALFA 300 MCG: 300 INJECTION, SOLUTION INTRAVENOUS; SUBCUTANEOUS at 15:21

## 2023-10-11 ASSESSMENT — PAIN SCALES - GENERAL: PAINLEVEL: 0-NO PAIN

## 2023-10-11 NOTE — PATIENT INSTRUCTIONS
Reviewed labs and recent medical history.  Discussed his labs and noted that he needs a repeat CBC with diff in prep for his Aranesp. Order placed.  Okay to give Aranesp today if Hgb meets the order criteria.  Continue Aranesp as ordered.  Return to see MD in 3 months.

## 2023-10-11 NOTE — PROGRESS NOTES
Pt had stat labs drawn today. Waiting on results for aranesp. Continue labs every 2 weeks at [address] [facility] on Tuesdays. Aranesp injections set up on Wed's. Set up for MD visit 1/3

## 2023-10-11 NOTE — PROGRESS NOTES
Patient ID:Garcia Haskins is a 72 y.o. year old male patient with anemia related to chronic kidney disease and history of gastrointestinal bleeding.      Referring Physician: No referring provider defined for this encounter.   Primary Care Provider: Ben Dias MD    Chief Complaint  Chief Complaint   Patient presents with    Anemia   He is here today in follow-up      History of the Present Illness    1999.  History of upper GI bleed from bleeding ulcer according to the patient.  He is having the same time that he had a cerebrovascular accident with right-sided numbness.  Note from Dr. Dias said that the bleeding resolved off aspirin but he was continued on Plavix.    1/24/2020.  He had a visit with primary care physician Dr. Dias who [facility]  12/2017 hemoglobin was 11.7.  10/2019 hemoglobin was 12.1 with normal iron levels.  He was started iron and was taking it occasionally.  He was continued on statin.  Was noted his next colonoscopy was scheduled for 2021.  Patient had been diabetic.  Hemoglobin A1c in January 2020 was 6.8.    8/2/2021.  Abnormal ankle-brachial indices with significant calf pain.  Hemoglobin A1c August 2021 was 6.7.    6/26/2023.  Follow-up with Dr. Dias in his office.  Patient was given a pneumococcal vaccine and felt lightheaded.  Blood pressure was 90/50.  He was given fluids.  His blood sugar was checked and was 234.  The patient has been fasting for labs and was diaphoretic.  On recheck his blood pressure came up to 130/54.  CBC was abnormal.  White count was 11.3 with hemoglobin 9.7 hematocrit 30.4 with an MCV of 79 MCH has not been MCHC is 31.9 and platelet count was 465,000.  White blood cell differential count was not done.  Hemoglobin A1c that day was 8.0.  Serum chemistry showed a glucose of 192.  Sodium was 134 potassium 3.3 with a BUN of 30 and creatinine of 1.88.    7/20/2023.  Repeat laboratory data continues to be abnormal with a sugar of  335 sodium of 131 potassium 3.3 BUN of 36 and creatinine 2.25.  Calcium was 8.5.  CBC showed a white count of 5.9 with hemoglobin 8.8 hematocrit 28.0 and a platelet count of 403,000.  Blood cell differential count showed 87% polys, 9 lymphs, 2 monos, 2 eosinophils with mild hypochromia.  Serum protein electrophoresis was normal.  Ig kappa free light chains were 5.2 with IgG lambda free light chains of 4.56.    8/10/2023.  CBC showed a white count of 8.3 with hemoglobin 8.5 hematocrit 26.6 and a platelet count of 399,000.    8/22/2023.  Visit with Dr. Lu who felt the patient had significant anemia with low erythropoietin level and CKD predicting that he would benefit from an erythropoietin stimulating agent.  Have plans to start him on Aranesp 300 mcg subcu every 2 weeks x 3 as long as his hemoglobin was less than 10 and return in 6 weeks.  He was counseled to stay hydrated.    8/29/2023.  CBC showed a white count of 14.8 hemoglobin 9 hematocrit 28.5 and a platelet count of 431,000.  Sodium was 135 BUN was 40 creatinine 1.81 with a calcium of 8.3.    9/6/2023.  Seen by Dr. Mauricio Rachel of the nephrology.  Diagnoses were CKD, anemia, hypertension and hypercholesterolemia.  He knows the patient's baseline appears to be 1.84 creatinine.  He had diabetes with microalbuminuria and an SGLT2 inhibitor was added.  [facility]  Dr. Rachel noted right renal cyst, atrophic right kidney anemia of chronic disease with type 2 diabetes and microalbuminuria hypertension hyperlipidemia and hyponatremia which had improved after stopping the thiazide diuretic.    9/12/2023.  CBC showed a hemoglobin of 9.6 hematocrit 31.5    9/26/2023.  CBC showed a hemoglobin of 10.0 hematocrit 33.7      Comorbidities include cerebrovascular accident in 1999 with right-sided numbness.  History of bleeding ulcer in 1999, diabetes mellitus, hypercholesterolemia, thyroid disorder, hypertension and colon polyps.  He has a  surgical history of colonoscopy, tonsillectomy adenoidectomy esophagogastro duodenoscopy and colonoscopy as well as surgery on his wrist.  He has a diaphragmatic hernia and has sleep apnea history    Interval Note  10/11/2023.  He tells me he feels fine.  His appetite is good and he has gained a couple pounds.  He is physically active.  He said he sleeps all right he is not aware of any lumps or bumps.  He has no pain.  He has not seen any bleeding.  Blood pressure today is good at 166/61.  He has undergone repeat colonoscopy and had polypectomy.  He says that he was told to have another colonoscopy in about 3 years.  He has a history of a bleeding ulcer back in 1999.  This was around the time that he had his stroke causing right-sided numbness.    Mr. Haskins says that he has a fairly good appetite.  He says that he eats everything.  He eats red meat 4-5 times a week.  He takes a multiple vitamin daily and thinks that it has iron in it.  I told him to check to make sure that he did because he has been shown to be iron deficient in the past.    Monitoring Parameters    CBCs, iron stores, BMP, stools for occult blood    Review of Systems - Oncology   Review of Systems   Constitutional:  Negative for activity change, appetite change, chills, fatigue and fever.   HENT:  Negative for mouth sores, nosebleeds and trouble swallowing.    Respiratory:  Negative for shortness of breath.    Cardiovascular:  Negative for chest pain.   Gastrointestinal:  Negative for abdominal pain, blood in stool and nausea.   Genitourinary:  Negative for hematuria.   Musculoskeletal: Negative.    Skin: Negative.    Neurological:  Negative for headaches.   Hematological:  Negative for adenopathy.        Past Medical History  Past Medical History:   Diagnosis Date    Personal history of colonic polyps     History of colonic polyps    Personal history of other diseases of the digestive system     History of upper gastrointestinal hemorrhage     Personal history of other diseases of the nervous system and sense organs     History of peripheral neuropathy    Personal history of transient ischemic attack (TIA), and cerebral infarction without residual deficits 01/27/2022    History of cerebrovascular accident        Social History  Social History     Socioeconomic History    Marital status:      Spouse name: Not on file    Number of children: Not on file    Years of education: Not on file    Highest education level: Not on file   Occupational History    Not on file   Tobacco Use    Smoking status: Former     Types: Cigarettes    Smokeless tobacco: Never   Substance and Sexual Activity    Alcohol use: Not Currently    Drug use: Never    Sexual activity: Not on file   Other Topics Concern    Not on file   Social History Narrative    Not on file     Social Determinants of Health     Financial Resource Strain: Not on file   Food Insecurity: Not on file   Transportation Needs: Not on file   Physical Activity: Not on file   Stress: Not on file   Social Connections: Not on file   Intimate Partner Violence: Not on file   Housing Stability: Not on file   He smoked a pack and 1/2 to 2 packs of cigarettes a day for 36 years.  Previously stated he was not interested in lung cancer screening.    Family History  family history includes Stroke in his maternal grandmother and mother; cardiac disorder in his father and mother.     Current Medications  Current Outpatient Medications   Medication Instructions    acetaminophen (Tylenol) 500 mg tablet oral    calcitriol (ROCALTROL) 0.5 mcg, oral, 3 times weekly    calcium carbonate (Oscal) 500 mg calcium (1,250 mg) tablet 2 tablets, oral, Daily PRN    calcium citrate-vitamin D3 200 mg-6.25 mcg (250 unit) tablet 1 tablet, oral, Daily    chlorthalidone (HYGROTON) 25 mg, oral, Daily    CHOLECALCIFEROL, VITAMIN D3, ORAL oral, Daily, SHYANNE UNKNOWN STRENGTH    clopidogrel (PLAVIX) 75 mg, oral, Daily    ferrous sulfate 325 (65 Fe)  MG tablet oral    Jardiance 10 mg     levothyroxine (SYNTHROID, LEVOXYL) 175 mcg, oral, Daily    losartan (COZAAR) 100 mg, oral, Daily    metFORMIN XR (GLUCOPHAGE-XR) 1,000 mg, oral, Daily    multivitamin tablet oral    NIFEdipine ER (ADALAT CC) 90 mg, oral, Daily    omeprazole (PRILOSEC) 20 mg, oral, Daily    pioglitazone (Actos) 15 mg tablet 1 tablet, oral, Daily    potassium chloride CR (Klor-Con) 10 mEq ER tablet 10 mEq, oral, Daily, Do not crush, chew, or split.    simvastatin (ZOCOR) 40 mg, oral, Daily           OARRS Review  I have personally reviewed the OARRS report for Garcia Haskins. I have considered the risks of abuse, dependence, addiction and diversion.  No evidence or concern no stimulants sedatives or opioids have been ordered.    Vital signs  Blood pressure 166/61, pulse 64 and regular, respirations 18, pulse ox on room air 100% with a weight of 160 pounds.  Physical Exam  Vitals and nursing note reviewed. Exam conducted with a chaperone present.   Constitutional:       General: He is not in acute distress.     Appearance: Normal appearance. He is normal weight. He is not ill-appearing or toxic-appearing.      Comments: He is a well-developed well-nourished  male who is in no acute distress.  He does not appear to be acutely or chronically ill.  He does not appear to be pale.   HENT:      Head: Normocephalic and atraumatic.      Nose: Nose normal.      Mouth/Throat:      Mouth: Mucous membranes are moist.      Pharynx: Oropharynx is clear. No oropharyngeal exudate or posterior oropharyngeal erythema.      Comments: Upper and lower dentures.  Eyes:      General: No scleral icterus.     Extraocular Movements: Extraocular movements intact.      Conjunctiva/sclera: Conjunctivae normal.      Pupils: Pupils are equal, round, and reactive to light.   Cardiovascular:      Rate and Rhythm: Normal rate and regular rhythm.      Heart sounds: No murmur heard.  Pulmonary:      Effort: Pulmonary  effort is normal. No respiratory distress.      Breath sounds: Normal breath sounds. No wheezing or rales.   Abdominal:      General: There is no distension.      Palpations: Abdomen is soft. There is no mass.      Tenderness: There is no abdominal tenderness. There is no guarding or rebound.   Musculoskeletal:         General: Normal range of motion.      Cervical back: Normal range of motion and neck supple. No rigidity.      Right lower leg: No edema.      Left lower leg: No edema.   Lymphadenopathy:      Cervical: No cervical adenopathy.   Skin:     General: Skin is warm and dry.      Coloration: Skin is not jaundiced.   Neurological:      General: No focal deficit present.      Mental Status: He is alert and oriented to person, place, and time. Mental status is at baseline.      Cranial Nerves: No cranial nerve deficit.      Motor: No weakness.      Gait: Gait normal.   Psychiatric:         Mood and Affect: Mood normal.         Behavior: Behavior normal.         Thought Content: Thought content normal.         Judgment: Judgment normal.        Current Laboratory Data  10/10/2023.  CBC shows white count of 8.1 with hemoglobin 10.5 hematocrit 34.3 with an MCV of 76 MCH of 23 MCHC of 30.6.  BUN was 32 and creatinine was 1.64.  On repeat BUN was 29 creatinine was 1.85.  Calcium was low at 6.6.  Albumin was normal at 4.  Liver function tests are normal with the exception of an alk phosphatase of 280.  Ferritin was 42 folate was 7.2.  Iron continues to be low at 29 with a saturation of only 9%.  B12 level was normal at 459 erythropoietin level was 20.  eGFR was 38.      Recent Imaging    7/31/2023.  Renal ultrasound showed that the right kidney was smaller than the left.  There was a 7 mm cyst in the upper pole of the right kidney.  There is no evidence of hydronephrosis.  Bladder appeared to be normal.    Performance Status:  Asymptomatic    Assessment/Plan      1.  Anemia related to anemia of chronic disease, which  is his renal compromise and also to iron deficiency anemia related to previous gastrointestinal blood loss.  He has received Aranesp injections and is on multiple vitamin.  I suggested to him that he check to make sure that his multiple vitamin has iron in it to make sure that his stools have been repleted.  His iron saturation is still low.  This should be rechecked again in about 3 months to make sure that it is improving.  He may need iron infusions.  He has had a nice response of his blood count which continues to improve and needs to be checked on a regular basis.  He will continue his Aranesp as long as his blood count parameters are appropriate.  Blood pressure today was 166/61.  We encouraged him to monitor his blood pressures at home.    2.  Chronic kidney disease.  He is under the care of Dr. Mauricio Rachel of nephrology.    3.  Multiple comorbidities.  Comorbidities include cerebrovascular accident in 1999 with right-sided numbness.  History of bleeding ulcer in 1999, diabetes mellitus, hypercholesterolemia, thyroid disorder, hypertension and colon polyps.  He has a surgical history of colonoscopy, tonsillectomy adenoidectomy esophagogastro duodenoscopy and colonoscopy as well as surgery on his wrist.  He has a diaphragmatic hernia and has sleep apnea history.  Certainly his anemia of chronic disease could be related to his hypertension, diabetes, CKD and other disorders.    He will return to the office in about 3 months time for follow-up.  He knows to call in the interim should he have any change in his status, questions or concerns.      Maira Smith MD

## 2023-10-13 LAB — EPO SERPL-ACNC: 20 MU/ML (ref 4–27)

## 2023-10-14 ASSESSMENT — ENCOUNTER SYMPTOMS
FATIGUE: 0
HEMATURIA: 0
BLOOD IN STOOL: 0
APPETITE CHANGE: 0
TROUBLE SWALLOWING: 0
SHORTNESS OF BREATH: 0
CHILLS: 0
ADENOPATHY: 0
ACTIVITY CHANGE: 0
HEADACHES: 0
MUSCULOSKELETAL NEGATIVE: 1
ABDOMINAL PAIN: 0
FEVER: 0
NAUSEA: 0

## 2023-10-24 ENCOUNTER — LAB (OUTPATIENT)
Dept: LAB | Facility: LAB | Age: 73
End: 2023-10-24
Payer: MEDICARE

## 2023-10-24 DIAGNOSIS — N18.30 STAGE 3 CHRONIC KIDNEY DISEASE, UNSPECIFIED WHETHER STAGE 3A OR 3B CKD (MULTI): ICD-10-CM

## 2023-10-24 LAB
BASOPHILS # BLD AUTO: 0.07 X10*3/UL (ref 0–0.1)
BASOPHILS NFR BLD AUTO: 1 %
EOSINOPHIL # BLD AUTO: 0.17 X10*3/UL (ref 0–0.4)
EOSINOPHIL NFR BLD AUTO: 2.4 %
ERYTHROCYTE [DISTWIDTH] IN BLOOD BY AUTOMATED COUNT: 17.2 % (ref 11.5–14.5)
HCT VFR BLD AUTO: 35.6 % (ref 41–52)
HGB BLD-MCNC: 10.5 G/DL (ref 13.5–17.5)
IMM GRANULOCYTES # BLD AUTO: 0.02 X10*3/UL (ref 0–0.5)
IMM GRANULOCYTES NFR BLD AUTO: 0.3 % (ref 0–0.9)
LYMPHOCYTES # BLD AUTO: 0.71 X10*3/UL (ref 0.8–3)
LYMPHOCYTES NFR BLD AUTO: 10 %
MCH RBC QN AUTO: 23 PG (ref 26–34)
MCHC RBC AUTO-ENTMCNC: 29.5 G/DL (ref 32–36)
MCV RBC AUTO: 78 FL (ref 80–100)
MONOCYTES # BLD AUTO: 0.5 X10*3/UL (ref 0.05–0.8)
MONOCYTES NFR BLD AUTO: 7 %
NEUTROPHILS # BLD AUTO: 5.65 X10*3/UL (ref 1.6–5.5)
NEUTROPHILS NFR BLD AUTO: 79.3 %
NRBC BLD-RTO: 0 /100 WBCS (ref 0–0)
PLATELET # BLD AUTO: 479 X10*3/UL (ref 150–450)
PMV BLD AUTO: 10.4 FL (ref 7.5–11.5)
RBC # BLD AUTO: 4.57 X10*6/UL (ref 4.5–5.9)
WBC # BLD AUTO: 7.1 X10*3/UL (ref 4.4–11.3)

## 2023-10-24 PROCEDURE — 85025 COMPLETE CBC W/AUTO DIFF WBC: CPT

## 2023-10-24 PROCEDURE — 36415 COLL VENOUS BLD VENIPUNCTURE: CPT

## 2023-10-25 ENCOUNTER — INFUSION (OUTPATIENT)
Dept: HEMATOLOGY/ONCOLOGY | Facility: CLINIC | Age: 73
End: 2023-10-25
Payer: MEDICARE

## 2023-10-25 VITALS
HEART RATE: 74 BPM | RESPIRATION RATE: 16 BRPM | SYSTOLIC BLOOD PRESSURE: 155 MMHG | BODY MASS INDEX: 23.91 KG/M2 | OXYGEN SATURATION: 99 % | DIASTOLIC BLOOD PRESSURE: 67 MMHG | WEIGHT: 161.6 LBS | TEMPERATURE: 97.5 F

## 2023-10-25 DIAGNOSIS — N18.30 STAGE 3 CHRONIC KIDNEY DISEASE, UNSPECIFIED WHETHER STAGE 3A OR 3B CKD (MULTI): ICD-10-CM

## 2023-10-25 PROCEDURE — 96372 THER/PROPH/DIAG INJ SC/IM: CPT | Performed by: INTERNAL MEDICINE

## 2023-10-25 PROCEDURE — 2500000004 HC RX 250 GENERAL PHARMACY W/ HCPCS (ALT 636 FOR OP/ED): Mod: JZ | Performed by: INTERNAL MEDICINE

## 2023-10-25 PROCEDURE — 96372 THER/PROPH/DIAG INJ SC/IM: CPT

## 2023-10-25 RX ORDER — FAMOTIDINE 10 MG/ML
20 INJECTION INTRAVENOUS ONCE AS NEEDED
OUTPATIENT
Start: 2023-11-08

## 2023-10-25 RX ORDER — EPINEPHRINE 0.3 MG/.3ML
0.3 INJECTION SUBCUTANEOUS EVERY 5 MIN PRN
OUTPATIENT
Start: 2023-11-08

## 2023-10-25 RX ORDER — DIPHENHYDRAMINE HYDROCHLORIDE 50 MG/ML
50 INJECTION INTRAMUSCULAR; INTRAVENOUS AS NEEDED
OUTPATIENT
Start: 2023-11-08

## 2023-10-25 RX ORDER — ALBUTEROL SULFATE 0.83 MG/ML
3 SOLUTION RESPIRATORY (INHALATION) AS NEEDED
OUTPATIENT
Start: 2023-11-08

## 2023-10-25 RX ADMIN — DARBEPOETIN ALFA 300 MCG: 300 INJECTION, SOLUTION INTRAVENOUS; SUBCUTANEOUS at 13:46

## 2023-10-25 ASSESSMENT — PAIN SCALES - GENERAL: PAINLEVEL: 0-NO PAIN

## 2023-11-07 ENCOUNTER — LAB (OUTPATIENT)
Dept: LAB | Facility: LAB | Age: 73
End: 2023-11-07
Payer: MEDICARE

## 2023-11-07 DIAGNOSIS — N18.30 STAGE 3 CHRONIC KIDNEY DISEASE, UNSPECIFIED WHETHER STAGE 3A OR 3B CKD (MULTI): ICD-10-CM

## 2023-11-07 LAB
BASOPHILS # BLD AUTO: 0.08 X10*3/UL (ref 0–0.1)
BASOPHILS NFR BLD AUTO: 1.2 %
EOSINOPHIL # BLD AUTO: 0.17 X10*3/UL (ref 0–0.4)
EOSINOPHIL NFR BLD AUTO: 2.6 %
ERYTHROCYTE [DISTWIDTH] IN BLOOD BY AUTOMATED COUNT: 18.6 % (ref 11.5–14.5)
HCT VFR BLD AUTO: 40.5 % (ref 41–52)
HGB BLD-MCNC: 11.9 G/DL (ref 13.5–17.5)
IMM GRANULOCYTES # BLD AUTO: 0.01 X10*3/UL (ref 0–0.5)
IMM GRANULOCYTES NFR BLD AUTO: 0.2 % (ref 0–0.9)
LYMPHOCYTES # BLD AUTO: 0.9 X10*3/UL (ref 0.8–3)
LYMPHOCYTES NFR BLD AUTO: 13.7 %
MCH RBC QN AUTO: 22.8 PG (ref 26–34)
MCHC RBC AUTO-ENTMCNC: 29.4 G/DL (ref 32–36)
MCV RBC AUTO: 78 FL (ref 80–100)
MONOCYTES # BLD AUTO: 0.63 X10*3/UL (ref 0.05–0.8)
MONOCYTES NFR BLD AUTO: 9.6 %
NEUTROPHILS # BLD AUTO: 4.78 X10*3/UL (ref 1.6–5.5)
NEUTROPHILS NFR BLD AUTO: 72.7 %
NRBC BLD-RTO: 0 /100 WBCS (ref 0–0)
PLATELET # BLD AUTO: 418 X10*3/UL (ref 150–450)
RBC # BLD AUTO: 5.21 X10*6/UL (ref 4.5–5.9)
WBC # BLD AUTO: 6.6 X10*3/UL (ref 4.4–11.3)

## 2023-11-07 PROCEDURE — 85025 COMPLETE CBC W/AUTO DIFF WBC: CPT

## 2023-11-07 PROCEDURE — 36415 COLL VENOUS BLD VENIPUNCTURE: CPT

## 2023-11-22 ENCOUNTER — APPOINTMENT (OUTPATIENT)
Dept: HEMATOLOGY/ONCOLOGY | Facility: CLINIC | Age: 73
End: 2023-11-22
Payer: MEDICARE

## 2023-11-28 ENCOUNTER — LAB (OUTPATIENT)
Dept: LAB | Facility: LAB | Age: 73
End: 2023-11-28
Payer: MEDICARE

## 2023-11-28 DIAGNOSIS — N18.30 STAGE 3 CHRONIC KIDNEY DISEASE, UNSPECIFIED WHETHER STAGE 3A OR 3B CKD (MULTI): ICD-10-CM

## 2023-11-28 LAB
BASOPHILS # BLD AUTO: 0.15 X10*3/UL (ref 0–0.1)
BASOPHILS NFR BLD AUTO: 1.4 %
EOSINOPHIL # BLD AUTO: 0.56 X10*3/UL (ref 0–0.4)
EOSINOPHIL NFR BLD AUTO: 5.2 %
ERYTHROCYTE [DISTWIDTH] IN BLOOD BY AUTOMATED COUNT: 17.1 % (ref 11.5–14.5)
HCT VFR BLD AUTO: 42.8 % (ref 41–52)
HGB BLD-MCNC: 12.7 G/DL (ref 13.5–17.5)
IMM GRANULOCYTES # BLD AUTO: 0.03 X10*3/UL (ref 0–0.5)
IMM GRANULOCYTES NFR BLD AUTO: 0.3 % (ref 0–0.9)
LYMPHOCYTES # BLD AUTO: 1.36 X10*3/UL (ref 0.8–3)
LYMPHOCYTES NFR BLD AUTO: 12.6 %
MCH RBC QN AUTO: 22.8 PG (ref 26–34)
MCHC RBC AUTO-ENTMCNC: 29.7 G/DL (ref 32–36)
MCV RBC AUTO: 77 FL (ref 80–100)
MONOCYTES # BLD AUTO: 0.69 X10*3/UL (ref 0.05–0.8)
MONOCYTES NFR BLD AUTO: 6.4 %
NEUTROPHILS # BLD AUTO: 7.99 X10*3/UL (ref 1.6–5.5)
NEUTROPHILS NFR BLD AUTO: 74.1 %
NRBC BLD-RTO: 0 /100 WBCS (ref 0–0)
PLATELET # BLD AUTO: 462 X10*3/UL (ref 150–450)
RBC # BLD AUTO: 5.57 X10*6/UL (ref 4.5–5.9)
WBC # BLD AUTO: 10.8 X10*3/UL (ref 4.4–11.3)

## 2023-11-28 PROCEDURE — 36415 COLL VENOUS BLD VENIPUNCTURE: CPT

## 2023-11-28 PROCEDURE — 85025 COMPLETE CBC W/AUTO DIFF WBC: CPT

## 2023-11-30 DIAGNOSIS — N18.31 STAGE 3A CHRONIC KIDNEY DISEASE (MULTI): ICD-10-CM

## 2023-11-30 DIAGNOSIS — E61.1 IRON DEFICIENCY: Primary | ICD-10-CM

## 2023-12-05 ENCOUNTER — APPOINTMENT (OUTPATIENT)
Dept: NEPHROLOGY | Facility: CLINIC | Age: 73
End: 2023-12-05
Payer: MEDICARE

## 2023-12-05 ENCOUNTER — LAB (OUTPATIENT)
Dept: LAB | Facility: LAB | Age: 73
End: 2023-12-05
Payer: MEDICARE

## 2023-12-05 DIAGNOSIS — E61.1 IRON DEFICIENCY: ICD-10-CM

## 2023-12-05 DIAGNOSIS — N18.31 STAGE 3A CHRONIC KIDNEY DISEASE (MULTI): ICD-10-CM

## 2023-12-05 DIAGNOSIS — N18.30 STAGE 3 CHRONIC KIDNEY DISEASE, UNSPECIFIED WHETHER STAGE 3A OR 3B CKD (MULTI): ICD-10-CM

## 2023-12-05 LAB
ANION GAP SERPL CALC-SCNC: 12 MMOL/L (ref 10–20)
BASOPHILS # BLD AUTO: 0.12 X10*3/UL (ref 0–0.1)
BASOPHILS NFR BLD AUTO: 1.2 %
BUN SERPL-MCNC: 19 MG/DL (ref 6–23)
CALCIUM SERPL-MCNC: 6.3 MG/DL (ref 8.6–10.3)
CHLORIDE SERPL-SCNC: 100 MMOL/L (ref 98–107)
CO2 SERPL-SCNC: 29 MMOL/L (ref 21–32)
CREAT SERPL-MCNC: 1.42 MG/DL (ref 0.5–1.3)
EOSINOPHIL # BLD AUTO: 0.53 X10*3/UL (ref 0–0.4)
EOSINOPHIL NFR BLD AUTO: 5.3 %
ERYTHROCYTE [DISTWIDTH] IN BLOOD BY AUTOMATED COUNT: 17.1 % (ref 11.5–14.5)
GFR SERPL CREATININE-BSD FRML MDRD: 52 ML/MIN/1.73M*2
GLUCOSE SERPL-MCNC: 151 MG/DL (ref 74–99)
HCT VFR BLD AUTO: 39.2 % (ref 41–52)
HGB BLD-MCNC: 11.8 G/DL (ref 13.5–17.5)
IMM GRANULOCYTES # BLD AUTO: 0.02 X10*3/UL (ref 0–0.5)
IMM GRANULOCYTES NFR BLD AUTO: 0.2 % (ref 0–0.9)
IRON SATN MFR SERPL: 24 % (ref 25–45)
IRON SERPL-MCNC: 64 UG/DL (ref 35–150)
LYMPHOCYTES # BLD AUTO: 0.93 X10*3/UL (ref 0.8–3)
LYMPHOCYTES NFR BLD AUTO: 9.3 %
MCH RBC QN AUTO: 23 PG (ref 26–34)
MCHC RBC AUTO-ENTMCNC: 30.1 G/DL (ref 32–36)
MCV RBC AUTO: 76 FL (ref 80–100)
MONOCYTES # BLD AUTO: 0.75 X10*3/UL (ref 0.05–0.8)
MONOCYTES NFR BLD AUTO: 7.5 %
NEUTROPHILS # BLD AUTO: 7.61 X10*3/UL (ref 1.6–5.5)
NEUTROPHILS NFR BLD AUTO: 76.5 %
NRBC BLD-RTO: 0.6 /100 WBCS (ref 0–0)
PLATELET # BLD AUTO: 339 X10*3/UL (ref 150–450)
POTASSIUM SERPL-SCNC: 3.4 MMOL/L (ref 3.5–5.3)
RBC # BLD AUTO: 5.13 X10*6/UL (ref 4.5–5.9)
SODIUM SERPL-SCNC: 138 MMOL/L (ref 136–145)
TIBC SERPL-MCNC: 268 UG/DL (ref 240–445)
UIBC SERPL-MCNC: 204 UG/DL (ref 110–370)
WBC # BLD AUTO: 10 X10*3/UL (ref 4.4–11.3)

## 2023-12-05 PROCEDURE — 36415 COLL VENOUS BLD VENIPUNCTURE: CPT

## 2023-12-05 PROCEDURE — 85025 COMPLETE CBC W/AUTO DIFF WBC: CPT

## 2023-12-05 PROCEDURE — 80048 BASIC METABOLIC PNL TOTAL CA: CPT

## 2023-12-05 PROCEDURE — 83550 IRON BINDING TEST: CPT

## 2023-12-05 PROCEDURE — 83540 ASSAY OF IRON: CPT

## 2023-12-06 ENCOUNTER — APPOINTMENT (OUTPATIENT)
Dept: HEMATOLOGY/ONCOLOGY | Facility: CLINIC | Age: 73
End: 2023-12-06
Payer: MEDICARE

## 2023-12-06 ENCOUNTER — OFFICE VISIT (OUTPATIENT)
Dept: NEPHROLOGY | Facility: CLINIC | Age: 73
End: 2023-12-06
Payer: MEDICARE

## 2023-12-06 VITALS
WEIGHT: 162.6 LBS | BODY MASS INDEX: 24.08 KG/M2 | SYSTOLIC BLOOD PRESSURE: 144 MMHG | DIASTOLIC BLOOD PRESSURE: 76 MMHG | HEART RATE: 82 BPM | HEIGHT: 69 IN

## 2023-12-06 DIAGNOSIS — N18.31 STAGE 3A CHRONIC KIDNEY DISEASE (MULTI): Primary | ICD-10-CM

## 2023-12-06 DIAGNOSIS — I10 PRIMARY HYPERTENSION: ICD-10-CM

## 2023-12-06 DIAGNOSIS — E11.65 CONTROLLED TYPE 2 DIABETES MELLITUS WITH HYPERGLYCEMIA, WITHOUT LONG-TERM CURRENT USE OF INSULIN (MULTI): ICD-10-CM

## 2023-12-06 DIAGNOSIS — D63.8 ANEMIA OF CHRONIC DISORDER: ICD-10-CM

## 2023-12-06 PROCEDURE — 99213 OFFICE O/P EST LOW 20 MIN: CPT | Performed by: INTERNAL MEDICINE

## 2023-12-06 PROCEDURE — 3052F HG A1C>EQUAL 8.0%<EQUAL 9.0%: CPT | Performed by: INTERNAL MEDICINE

## 2023-12-06 PROCEDURE — 1160F RVW MEDS BY RX/DR IN RCRD: CPT | Performed by: INTERNAL MEDICINE

## 2023-12-06 PROCEDURE — 1036F TOBACCO NON-USER: CPT | Performed by: INTERNAL MEDICINE

## 2023-12-06 PROCEDURE — 3078F DIAST BP <80 MM HG: CPT | Performed by: INTERNAL MEDICINE

## 2023-12-06 PROCEDURE — 3077F SYST BP >= 140 MM HG: CPT | Performed by: INTERNAL MEDICINE

## 2023-12-06 PROCEDURE — 3066F NEPHROPATHY DOC TX: CPT | Performed by: INTERNAL MEDICINE

## 2023-12-06 PROCEDURE — 1126F AMNT PAIN NOTED NONE PRSNT: CPT | Performed by: INTERNAL MEDICINE

## 2023-12-06 PROCEDURE — 4010F ACE/ARB THERAPY RXD/TAKEN: CPT | Performed by: INTERNAL MEDICINE

## 2023-12-06 PROCEDURE — 1159F MED LIST DOCD IN RCRD: CPT | Performed by: INTERNAL MEDICINE

## 2023-12-06 NOTE — PROGRESS NOTES
Subjective   Feels well  Measures BP at home occasionally.    Runs 140+145  Patient ID: Garcia Haskins is a 73 y.o. male who presents for Follow-up (3 month ck/Review labs).  HPI  He is here for follow-up secondary to chronic kidney disease stage III with a baseline creatinine of about 1.8 has an atrophic right kidney with anemia of chronic disease type 2 diabetes hypertension and microalbuminuria.    Labs were drawn just yesterday.  Iron studies show a low percent saturation and his iron level was 64  Basic metabolic panel shows a potassium of 3.4 BUN is 19 with a creatinine of 1.42 with an estimated GFR of 52 calcium is 6.3  His hemoglobin is good at 11.8  Medications are reviewed he is on calcitriol calcium chlorthalidone Plavix iron Jardiance losartan metformin nifedipine PPI potassium and a statin  Blood pressure here in the office is 144/76  Review of Systems   All other systems reviewed and are negative.      Objective   Physical Exam  Constitutional:       Appearance: Normal appearance.   HENT:      Head: Normocephalic and atraumatic.      Right Ear: External ear normal.      Left Ear: External ear normal.      Nose: Nose normal.      Mouth/Throat:      Mouth: Mucous membranes are moist.      Pharynx: Oropharynx is clear.   Eyes:      Extraocular Movements: Extraocular movements intact.      Conjunctiva/sclera: Conjunctivae normal.      Pupils: Pupils are equal, round, and reactive to light.   Cardiovascular:      Rate and Rhythm: Normal rate and regular rhythm.   Pulmonary:      Effort: Pulmonary effort is normal.      Breath sounds: Normal breath sounds.   Abdominal:      General: Abdomen is flat.      Palpations: Abdomen is soft.   Skin:     General: Skin is warm and dry.   Neurological:      General: No focal deficit present.      Mental Status: He is alert and oriented to person, place, and time.   Psychiatric:         Mood and Affect: Mood normal.         Behavior: Behavior normal.          Assessment/Plan   Problem List Items Addressed This Visit             ICD-10-CM    Anemia of chronic disorder D63.8    CKD (chronic kidney disease) - Primary N18.9     Baseline creatinine in the past at 1.8.  His recent creatinine is improved from that at 1.5  Has underlying diabetes and hypertension  Also has microalbuminuria         Relevant Orders    Basic metabolic panel    PTH, intact    Urinalysis with Reflex Microscopic    Albumin, urine, random    Follow Up In Nephrology    Controlled type 2 diabetes mellitus with hyperglycemia, without long-term current use of insulin (CMS/[facility]) E11.65    HTN (hypertension) I10     Goal blood pressures less than 130/80  Blood pressure still not at goal  Has resistant hypertension        Bp not yet at goal  However feels well and Renal function looks good.  In past had Hyponatremia with Thiazide so have to be careful.    Get UA and albumin at next check.  If stable will leave.         CKD 3 with baseline creatinine about 1.8  Right renal cyst  Atrophic right kidney  Anemia of chronic disease  Type 2 diabetes  Microalbuminuria  Hypertension: Resistant  Hyperlipidemia  Hypokalemia  Hypocalcemia    Mauricio Rachel DO 12/06/23 9:31 AM

## 2023-12-06 NOTE — ASSESSMENT & PLAN NOTE
Baseline creatinine in the past at 1.8.  His recent creatinine is improved from that at 1.5  Has underlying diabetes and hypertension  Also has microalbuminuria

## 2023-12-06 NOTE — ASSESSMENT & PLAN NOTE
Goal blood pressures less than 130/80  Blood pressure still not at goal  Has resistant hypertension

## 2023-12-18 DIAGNOSIS — E11.65 TYPE 2 DIABETES MELLITUS WITH HYPERGLYCEMIA, WITHOUT LONG-TERM CURRENT USE OF INSULIN (MULTI): ICD-10-CM

## 2023-12-18 NOTE — TELEPHONE ENCOUNTER
Medication Refill Request    Medication:  empagliflozin    Pharmacy:  [facility]    Last OV:  06/26/23  Upcoming appointment:  Appt needed    ORDER PENDED

## 2023-12-20 ENCOUNTER — TELEPHONE (OUTPATIENT)
Dept: HEMATOLOGY/ONCOLOGY | Facility: CLINIC | Age: 73
End: 2023-12-20
Payer: MEDICARE

## 2023-12-20 ENCOUNTER — APPOINTMENT (OUTPATIENT)
Dept: HEMATOLOGY/ONCOLOGY | Facility: CLINIC | Age: 73
End: 2023-12-20
Payer: MEDICARE

## 2023-12-20 NOTE — TELEPHONE ENCOUNTER
Patient called after not showing up for scheduled labs and Aranesp inj if meets parameters. Patient explains that he didn't feel well and has been staying home. This nurse suggested he reschedule for his lab work when he feels better and it was emphasized that his labs would need to be watched even if he did not qualify for an injection but was still low HGB. He voiced understanding. He repeated the hours the lab would be open at the [place] location.

## 2023-12-21 ENCOUNTER — LAB (OUTPATIENT)
Dept: LAB | Facility: LAB | Age: 73
End: 2023-12-21
Payer: MEDICARE

## 2023-12-21 ENCOUNTER — TELEPHONE (OUTPATIENT)
Dept: PRIMARY CARE | Facility: CLINIC | Age: 73
End: 2023-12-21
Payer: MEDICARE

## 2023-12-21 DIAGNOSIS — N18.30 STAGE 3 CHRONIC KIDNEY DISEASE, UNSPECIFIED WHETHER STAGE 3A OR 3B CKD (MULTI): ICD-10-CM

## 2023-12-21 DIAGNOSIS — I10 PRIMARY HYPERTENSION: ICD-10-CM

## 2023-12-21 LAB
BASOPHILS # BLD AUTO: 0.04 X10*3/UL (ref 0–0.1)
BASOPHILS NFR BLD AUTO: 0.3 %
EOSINOPHIL # BLD AUTO: 0.39 X10*3/UL (ref 0–0.4)
EOSINOPHIL NFR BLD AUTO: 3.4 %
ERYTHROCYTE [DISTWIDTH] IN BLOOD BY AUTOMATED COUNT: 17 % (ref 11.5–14.5)
HCT VFR BLD AUTO: 35.4 % (ref 41–52)
HGB BLD-MCNC: 10.7 G/DL (ref 13.5–17.5)
IMM GRANULOCYTES # BLD AUTO: 0.04 X10*3/UL (ref 0–0.5)
IMM GRANULOCYTES NFR BLD AUTO: 0.3 % (ref 0–0.9)
LYMPHOCYTES # BLD AUTO: 1.23 X10*3/UL (ref 0.8–3)
LYMPHOCYTES NFR BLD AUTO: 10.6 %
MCH RBC QN AUTO: 23.7 PG (ref 26–34)
MCHC RBC AUTO-ENTMCNC: 30.2 G/DL (ref 32–36)
MCV RBC AUTO: 78 FL (ref 80–100)
MONOCYTES # BLD AUTO: 0.8 X10*3/UL (ref 0.05–0.8)
MONOCYTES NFR BLD AUTO: 6.9 %
NEUTROPHILS # BLD AUTO: 9.06 X10*3/UL (ref 1.6–5.5)
NEUTROPHILS NFR BLD AUTO: 78.5 %
NRBC BLD-RTO: 0 /100 WBCS (ref 0–0)
PLATELET # BLD AUTO: 411 X10*3/UL (ref 150–450)
RBC # BLD AUTO: 4.52 X10*6/UL (ref 4.5–5.9)
WBC # BLD AUTO: 11.6 X10*3/UL (ref 4.4–11.3)

## 2023-12-21 PROCEDURE — 36415 COLL VENOUS BLD VENIPUNCTURE: CPT

## 2023-12-21 PROCEDURE — 85025 COMPLETE CBC W/AUTO DIFF WBC: CPT

## 2023-12-21 RX ORDER — NIFEDIPINE 90 MG/1
90 TABLET, EXTENDED RELEASE ORAL DAILY
Qty: 90 TABLET | Refills: 0 | Status: SHIPPED | OUTPATIENT
Start: 2023-12-21 | End: 2024-03-21 | Stop reason: SDUPTHER

## 2023-12-21 NOTE — TELEPHONE ENCOUNTER
Please send refill to [facility]  NIFEdipine CC (Adalat CC) 90 mg 24 hr tablet [[id number]]    Order Details  Dose: 90 mg Route: oral Frequency: Daily   Dispense Quantity: 90 tablet Refills: 3          Sig: Take 1 tablet (90 mg) by mouth once daily.

## 2023-12-26 ENCOUNTER — LAB (OUTPATIENT)
Dept: LAB | Facility: LAB | Age: 73
End: 2023-12-26
Payer: MEDICARE

## 2023-12-26 ENCOUNTER — OFFICE VISIT (OUTPATIENT)
Dept: PRIMARY CARE | Facility: CLINIC | Age: 73
End: 2023-12-26
Payer: MEDICARE

## 2023-12-26 VITALS
WEIGHT: 157.7 LBS | SYSTOLIC BLOOD PRESSURE: 142 MMHG | OXYGEN SATURATION: 98 % | HEART RATE: 74 BPM | BODY MASS INDEX: 23.29 KG/M2 | DIASTOLIC BLOOD PRESSURE: 64 MMHG | TEMPERATURE: 97.8 F

## 2023-12-26 DIAGNOSIS — E11.65 TYPE 2 DIABETES MELLITUS WITH HYPERGLYCEMIA, WITHOUT LONG-TERM CURRENT USE OF INSULIN (MULTI): ICD-10-CM

## 2023-12-26 DIAGNOSIS — J06.9 VIRAL UPPER RESPIRATORY TRACT INFECTION: Primary | ICD-10-CM

## 2023-12-26 DIAGNOSIS — Z23 ENCOUNTER FOR ADMINISTRATION OF VACCINE: ICD-10-CM

## 2023-12-26 DIAGNOSIS — K21.9 GASTROESOPHAGEAL REFLUX DISEASE, UNSPECIFIED WHETHER ESOPHAGITIS PRESENT: ICD-10-CM

## 2023-12-26 LAB
EST. AVERAGE GLUCOSE BLD GHB EST-MCNC: 174 MG/DL
HBA1C MFR BLD: 7.7 %

## 2023-12-26 PROCEDURE — 1036F TOBACCO NON-USER: CPT | Performed by: FAMILY MEDICINE

## 2023-12-26 PROCEDURE — 1126F AMNT PAIN NOTED NONE PRSNT: CPT | Performed by: FAMILY MEDICINE

## 2023-12-26 PROCEDURE — 90662 IIV NO PRSV INCREASED AG IM: CPT | Performed by: FAMILY MEDICINE

## 2023-12-26 PROCEDURE — 3066F NEPHROPATHY DOC TX: CPT | Performed by: FAMILY MEDICINE

## 2023-12-26 PROCEDURE — 4010F ACE/ARB THERAPY RXD/TAKEN: CPT | Performed by: FAMILY MEDICINE

## 2023-12-26 PROCEDURE — 1159F MED LIST DOCD IN RCRD: CPT | Performed by: FAMILY MEDICINE

## 2023-12-26 PROCEDURE — 99214 OFFICE O/P EST MOD 30 MIN: CPT | Performed by: FAMILY MEDICINE

## 2023-12-26 PROCEDURE — 3077F SYST BP >= 140 MM HG: CPT | Performed by: FAMILY MEDICINE

## 2023-12-26 PROCEDURE — G0008 ADMIN INFLUENZA VIRUS VAC: HCPCS | Performed by: FAMILY MEDICINE

## 2023-12-26 PROCEDURE — 83036 HEMOGLOBIN GLYCOSYLATED A1C: CPT

## 2023-12-26 PROCEDURE — 3078F DIAST BP <80 MM HG: CPT | Performed by: FAMILY MEDICINE

## 2023-12-26 PROCEDURE — 3052F HG A1C>EQUAL 8.0%<EQUAL 9.0%: CPT | Performed by: FAMILY MEDICINE

## 2023-12-26 PROCEDURE — 1160F RVW MEDS BY RX/DR IN RCRD: CPT | Performed by: FAMILY MEDICINE

## 2023-12-26 PROCEDURE — 36415 COLL VENOUS BLD VENIPUNCTURE: CPT

## 2023-12-26 RX ORDER — BENZONATATE 100 MG/1
100 CAPSULE ORAL 3 TIMES DAILY PRN
Qty: 42 CAPSULE | Refills: 0 | Status: SHIPPED | OUTPATIENT
Start: 2023-12-26 | End: 2024-01-25

## 2023-12-26 NOTE — PROGRESS NOTES
Diabetes:  Garcia Haskins is a 73 y.o. male who presents for follow up of diabetes.. Current symptoms include: none.  Evaluation to date has been: hemoglobin A1C. Home sugars:  checking blood sugar 3 times a week .  Last eye doctor visit:  unknown    Laboratory:  Lab Results   Component Value Date    HGBA1C 8.0 (A) 06/26/2023     HTN:  Patient here for follow-up of elevated blood pressure.  He is exercising and is adherent to a low-salt diet. Blood pressure is well controlled at home. Cardiac symptoms: none. . Cardiovascular risk factors: diabetes mellitus.     Hypothyroidism:   follow up of hypothyroidism. Current symptoms: change in energy level.   Laboratory:  Lab Results   Component Value Date    TSH 0.54 11/21/2022     GERD:  Patient denies having frequent heartburn.

## 2023-12-26 NOTE — PROGRESS NOTES
Subjective   Patient ID: Garcia Haskins is a 73 y.o. male who presents for Hypertension (6 mo/), Diabetes (6 mo), Hypothyroidism (6 mo), GERD (6 mo), and Hyperlipidemia (6 mo).    HPI       URI      Cough x 1 week  now only a night was all day and night      No fevers      Some productive yellow     No st     No sob     No cp     Otc coricidin     Ckd 3a      followed by nephrology      nocturia up  to 6   Flomax did not help and does not want to see urology      HTN  borderline control followed by renal      State chlorthalidone and calcitriol dc'ed per renal but unclear from last note      PVD   2022 PAD test borderline/normal    patient is already on Clopidogrel and statin therapy.      Patient is a diabetic he is on Metformin   a1c pending for today  Checks bs three times per week      180 to 130       Dr Rachel started jardiance since last OV   last eye exam more than 2.5 yr ago      Former smoker quit 2002   No longer going to [facility]     H/o cva on plavix     Unable to tolerate asa gi ulcer    No melena but with iron is dark     In blood in the stool       Review of Systems    Objective   /64 (BP Location: Left arm, Patient Position: Sitting)   Pulse 74   Temp 36.6 °C (97.8 °F)   Wt 71.5 kg (157 lb 11.2 oz)   SpO2 98%   BMI 23.29 kg/m²     Physical Exam    Assessment/Plan   Diagnoses and all orders for this visit:  Viral upper respiratory tract infection  -     benzonatate (Tessalon) 100 mg capsule; Take 1 capsule (100 mg) by mouth 3 times a day as needed for cough. Do not crush or chew.  Type 2 diabetes mellitus with hyperglycemia, without long-term current use of insulin (CMS/[facility])  -     empagliflozin (Jardiance) 10 mg; Take 1 tablet (10 mg) by mouth once daily. Last refill, appointment needed  -     Hemoglobin A1C; Future    If cont with cough or worsens after 3 weeks will call for atb.

## 2024-01-03 ENCOUNTER — APPOINTMENT (OUTPATIENT)
Dept: HEMATOLOGY/ONCOLOGY | Facility: CLINIC | Age: 74
End: 2024-01-03
Payer: MEDICARE

## 2024-01-12 ENCOUNTER — APPOINTMENT (OUTPATIENT)
Dept: HEMATOLOGY/ONCOLOGY | Facility: CLINIC | Age: 74
End: 2024-01-12
Payer: MEDICARE

## 2024-01-17 ENCOUNTER — APPOINTMENT (OUTPATIENT)
Dept: HEMATOLOGY/ONCOLOGY | Facility: CLINIC | Age: 74
End: 2024-01-17
Payer: MEDICARE

## 2024-01-25 ENCOUNTER — TELEPHONE (OUTPATIENT)
Dept: HEMATOLOGY/ONCOLOGY | Facility: CLINIC | Age: 74
End: 2024-01-25
Payer: MEDICARE

## 2024-02-14 ENCOUNTER — APPOINTMENT (OUTPATIENT)
Dept: HEMATOLOGY/ONCOLOGY | Facility: CLINIC | Age: 74
End: 2024-02-14
Payer: MEDICARE

## 2024-03-21 ENCOUNTER — TELEPHONE (OUTPATIENT)
Dept: PRIMARY CARE | Facility: CLINIC | Age: 74
End: 2024-03-21
Payer: MEDICARE

## 2024-03-21 DIAGNOSIS — I10 PRIMARY HYPERTENSION: ICD-10-CM

## 2024-03-21 RX ORDER — NIFEDIPINE 90 MG/1
90 TABLET, EXTENDED RELEASE ORAL DAILY
Qty: 90 TABLET | Refills: 3 | Status: SHIPPED | OUTPATIENT
Start: 2024-03-21 | End: 2024-05-13 | Stop reason: SDUPTHER

## 2024-03-28 ENCOUNTER — APPOINTMENT (OUTPATIENT)
Dept: HEMATOLOGY/ONCOLOGY | Facility: CLINIC | Age: 74
End: 2024-03-28
Payer: MEDICARE

## 2024-04-04 ENCOUNTER — APPOINTMENT (OUTPATIENT)
Dept: NEPHROLOGY | Facility: CLINIC | Age: 74
End: 2024-04-04
Payer: MEDICARE

## 2024-04-09 ENCOUNTER — LAB (OUTPATIENT)
Dept: LAB | Facility: LAB | Age: 74
End: 2024-04-09
Payer: MEDICARE

## 2024-04-09 DIAGNOSIS — N18.31 STAGE 3A CHRONIC KIDNEY DISEASE (MULTI): ICD-10-CM

## 2024-04-09 DIAGNOSIS — N18.30 STAGE 3 CHRONIC KIDNEY DISEASE, UNSPECIFIED WHETHER STAGE 3A OR 3B CKD (MULTI): ICD-10-CM

## 2024-04-09 LAB
ANION GAP SERPL CALC-SCNC: 16 MMOL/L (ref 10–20)
BASOPHILS # BLD AUTO: 0.07 X10*3/UL (ref 0–0.1)
BASOPHILS NFR BLD AUTO: 0.9 %
BUN SERPL-MCNC: 27 MG/DL (ref 6–23)
CALCIUM SERPL-MCNC: 5.8 MG/DL (ref 8.6–10.3)
CHLORIDE SERPL-SCNC: 99 MMOL/L (ref 98–107)
CO2 SERPL-SCNC: 27 MMOL/L (ref 21–32)
CREAT SERPL-MCNC: 1.73 MG/DL (ref 0.5–1.3)
EGFRCR SERPLBLD CKD-EPI 2021: 41 ML/MIN/1.73M*2
EOSINOPHIL # BLD AUTO: 0.19 X10*3/UL (ref 0–0.4)
EOSINOPHIL NFR BLD AUTO: 2.3 %
ERYTHROCYTE [DISTWIDTH] IN BLOOD BY AUTOMATED COUNT: 14.9 % (ref 11.5–14.5)
GLUCOSE SERPL-MCNC: 181 MG/DL (ref 74–99)
HCT VFR BLD AUTO: 34.7 % (ref 41–52)
HGB BLD-MCNC: 11.1 G/DL (ref 13.5–17.5)
IMM GRANULOCYTES # BLD AUTO: 0.02 X10*3/UL (ref 0–0.5)
IMM GRANULOCYTES NFR BLD AUTO: 0.2 % (ref 0–0.9)
LYMPHOCYTES # BLD AUTO: 0.96 X10*3/UL (ref 0.8–3)
LYMPHOCYTES NFR BLD AUTO: 11.8 %
MCH RBC QN AUTO: 26 PG (ref 26–34)
MCHC RBC AUTO-ENTMCNC: 32 G/DL (ref 32–36)
MCV RBC AUTO: 81 FL (ref 80–100)
MONOCYTES # BLD AUTO: 0.71 X10*3/UL (ref 0.05–0.8)
MONOCYTES NFR BLD AUTO: 8.7 %
NEUTROPHILS # BLD AUTO: 6.18 X10*3/UL (ref 1.6–5.5)
NEUTROPHILS NFR BLD AUTO: 76.1 %
NRBC BLD-RTO: 0 /100 WBCS (ref 0–0)
PLATELET # BLD AUTO: 431 X10*3/UL (ref 150–450)
POTASSIUM SERPL-SCNC: 3.6 MMOL/L (ref 3.5–5.3)
RBC # BLD AUTO: 4.27 X10*6/UL (ref 4.5–5.9)
SODIUM SERPL-SCNC: 138 MMOL/L (ref 136–145)
WBC # BLD AUTO: 8.1 X10*3/UL (ref 4.4–11.3)

## 2024-04-09 PROCEDURE — 83970 ASSAY OF PARATHORMONE: CPT

## 2024-04-09 PROCEDURE — 80048 BASIC METABOLIC PNL TOTAL CA: CPT

## 2024-04-09 PROCEDURE — 85025 COMPLETE CBC W/AUTO DIFF WBC: CPT

## 2024-04-09 PROCEDURE — 36415 COLL VENOUS BLD VENIPUNCTURE: CPT

## 2024-04-10 LAB — PTH-INTACT SERPL-MCNC: 10.8 PG/ML (ref 18.5–88)

## 2024-04-25 ENCOUNTER — LAB (OUTPATIENT)
Dept: LAB | Facility: LAB | Age: 74
End: 2024-04-25
Payer: MEDICARE

## 2024-04-25 DIAGNOSIS — N18.31 CHRONIC KIDNEY DISEASE, STAGE 3A (MULTI): Primary | ICD-10-CM

## 2024-04-25 LAB
APPEARANCE UR: CLEAR
BILIRUB UR STRIP.AUTO-MCNC: NEGATIVE MG/DL
COLOR UR: ABNORMAL
GLUCOSE UR STRIP.AUTO-MCNC: ABNORMAL MG/DL
KETONES UR STRIP.AUTO-MCNC: NEGATIVE MG/DL
LEUKOCYTE ESTERASE UR QL STRIP.AUTO: NEGATIVE
NITRITE UR QL STRIP.AUTO: NEGATIVE
PH UR STRIP.AUTO: 7 [PH]
PROT UR STRIP.AUTO-MCNC: ABNORMAL MG/DL
RBC # UR STRIP.AUTO: NEGATIVE /UL
RBC #/AREA URNS AUTO: NORMAL /HPF
SP GR UR STRIP.AUTO: 1.01
UROBILINOGEN UR STRIP.AUTO-MCNC: <2 MG/DL
WBC #/AREA URNS AUTO: NORMAL /HPF

## 2024-04-25 PROCEDURE — 82570 ASSAY OF URINE CREATININE: CPT

## 2024-04-25 PROCEDURE — 82043 UR ALBUMIN QUANTITATIVE: CPT

## 2024-04-25 PROCEDURE — 81001 URINALYSIS AUTO W/SCOPE: CPT

## 2024-04-26 LAB
CREAT UR-MCNC: 23.1 MG/DL (ref 20–370)
MICROALBUMIN UR-MCNC: 224.6 MG/L
MICROALBUMIN/CREAT UR: 972.3 UG/MG CREAT

## 2024-05-11 DIAGNOSIS — E03.9 ACQUIRED HYPOTHYROIDISM: ICD-10-CM

## 2024-05-11 DIAGNOSIS — E11.65 TYPE 2 DIABETES MELLITUS WITH HYPERGLYCEMIA, WITHOUT LONG-TERM CURRENT USE OF INSULIN (MULTI): ICD-10-CM

## 2024-05-11 DIAGNOSIS — E78.49 OTHER HYPERLIPIDEMIA: ICD-10-CM

## 2024-05-11 DIAGNOSIS — I10 PRIMARY HYPERTENSION: ICD-10-CM

## 2024-05-13 ENCOUNTER — TELEPHONE (OUTPATIENT)
Dept: PRIMARY CARE | Facility: CLINIC | Age: 74
End: 2024-05-13
Payer: MEDICARE

## 2024-05-13 DIAGNOSIS — E78.49 OTHER HYPERLIPIDEMIA: ICD-10-CM

## 2024-05-13 DIAGNOSIS — E11.65 TYPE 2 DIABETES MELLITUS WITH HYPERGLYCEMIA, WITHOUT LONG-TERM CURRENT USE OF INSULIN (MULTI): ICD-10-CM

## 2024-05-13 DIAGNOSIS — K21.9 GASTROESOPHAGEAL REFLUX DISEASE, UNSPECIFIED WHETHER ESOPHAGITIS PRESENT: ICD-10-CM

## 2024-05-13 DIAGNOSIS — I10 PRIMARY HYPERTENSION: ICD-10-CM

## 2024-05-13 DIAGNOSIS — E03.9 ACQUIRED HYPOTHYROIDISM: ICD-10-CM

## 2024-05-13 RX ORDER — LEVOTHYROXINE SODIUM 175 UG/1
175 TABLET ORAL DAILY
Qty: 90 TABLET | Refills: 3 | Status: SHIPPED | OUTPATIENT
Start: 2024-05-13 | End: 2025-05-13

## 2024-05-13 RX ORDER — NIFEDIPINE 90 MG/1
90 TABLET, EXTENDED RELEASE ORAL DAILY
Qty: 90 TABLET | Refills: 3 | Status: SHIPPED | OUTPATIENT
Start: 2024-05-13 | End: 2025-05-13

## 2024-05-13 RX ORDER — SIMVASTATIN 40 MG/1
40 TABLET, FILM COATED ORAL DAILY
Qty: 100 TABLET | Refills: 2 | OUTPATIENT
Start: 2024-05-13

## 2024-05-13 RX ORDER — OMEPRAZOLE 20 MG/1
20 CAPSULE, DELAYED RELEASE ORAL DAILY
Qty: 90 CAPSULE | Refills: 3 | Status: SHIPPED | OUTPATIENT
Start: 2024-05-13 | End: 2025-05-13

## 2024-05-13 RX ORDER — CLOPIDOGREL BISULFATE 75 MG/1
75 TABLET ORAL DAILY
Qty: 90 TABLET | Refills: 3 | Status: SHIPPED | OUTPATIENT
Start: 2024-05-13 | End: 2025-05-13

## 2024-05-13 RX ORDER — LEVOTHYROXINE SODIUM 175 UG/1
175 TABLET ORAL DAILY
Qty: 100 TABLET | Refills: 2 | OUTPATIENT
Start: 2024-05-13

## 2024-05-13 RX ORDER — METFORMIN HYDROCHLORIDE 500 MG/1
1000 TABLET, EXTENDED RELEASE ORAL DAILY
Qty: 200 TABLET | Refills: 2 | OUTPATIENT
Start: 2024-05-13

## 2024-05-13 RX ORDER — LOSARTAN POTASSIUM 100 MG/1
100 TABLET ORAL DAILY
Qty: 100 TABLET | Refills: 2 | OUTPATIENT
Start: 2024-05-13

## 2024-05-13 RX ORDER — LOSARTAN POTASSIUM 100 MG/1
100 TABLET ORAL DAILY
Qty: 90 TABLET | Refills: 3 | Status: SHIPPED | OUTPATIENT
Start: 2024-05-13 | End: 2025-05-13

## 2024-05-13 RX ORDER — CLOPIDOGREL BISULFATE 75 MG/1
75 TABLET ORAL DAILY
Qty: 100 TABLET | Refills: 2 | OUTPATIENT
Start: 2024-05-13

## 2024-05-13 RX ORDER — SIMVASTATIN 40 MG/1
40 TABLET, FILM COATED ORAL DAILY
Qty: 90 TABLET | Refills: 3 | Status: SHIPPED | OUTPATIENT
Start: 2024-05-13 | End: 2025-05-13

## 2024-05-13 NOTE — TELEPHONE ENCOUNTER
Pharmacy says they need refills even though computer says there are refills:     empagliflozin (Jardiance) 10 mg    NIFEdipine ER (NIFEdipine CC) 90 mg 24 hr tablet   levothyroxine (Synthroid, Levoxyl) 175 mcg tablet   omeprazole (PriLOSEC) 20 mg DR capsule   losartan (Cozaar) 100 mg tablet    clopidogrel (Plavix) 75 mg tablet    simvastatin (Zocor) 40 mg tablet

## 2024-05-26 DIAGNOSIS — E11.65 TYPE 2 DIABETES MELLITUS WITH HYPERGLYCEMIA, WITHOUT LONG-TERM CURRENT USE OF INSULIN (MULTI): ICD-10-CM

## 2024-05-28 RX ORDER — METFORMIN HYDROCHLORIDE 500 MG/1
1000 TABLET, EXTENDED RELEASE ORAL DAILY
Qty: 200 TABLET | Refills: 2 | OUTPATIENT
Start: 2024-05-28

## 2024-07-03 ENCOUNTER — APPOINTMENT (OUTPATIENT)
Dept: PRIMARY CARE | Facility: CLINIC | Age: 74
End: 2024-07-03
Payer: MEDICARE

## 2024-08-06 ENCOUNTER — TELEPHONE (OUTPATIENT)
Dept: HEMATOLOGY/ONCOLOGY | Facility: CLINIC | Age: 74
End: 2024-08-06
Payer: MEDICARE

## 2024-08-20 ENCOUNTER — APPOINTMENT (OUTPATIENT)
Dept: PRIMARY CARE | Facility: CLINIC | Age: 74
End: 2024-08-20
Payer: MEDICARE

## 2024-08-20 VITALS
OXYGEN SATURATION: 97 % | WEIGHT: 159 LBS | BODY MASS INDEX: 23.48 KG/M2 | SYSTOLIC BLOOD PRESSURE: 150 MMHG | HEART RATE: 76 BPM | DIASTOLIC BLOOD PRESSURE: 70 MMHG

## 2024-08-20 DIAGNOSIS — E03.9 ACQUIRED HYPOTHYROIDISM: ICD-10-CM

## 2024-08-20 DIAGNOSIS — N25.81 HYPERPARATHYROIDISM DUE TO RENAL INSUFFICIENCY (MULTI): ICD-10-CM

## 2024-08-20 DIAGNOSIS — I73.9 PERIPHERAL VASCULAR DISEASE (CMS-HCC): ICD-10-CM

## 2024-08-20 DIAGNOSIS — Z00.00 ROUTINE GENERAL MEDICAL EXAMINATION AT HEALTH CARE FACILITY: Primary | ICD-10-CM

## 2024-08-20 DIAGNOSIS — E11.65 TYPE 2 DIABETES MELLITUS WITH HYPERGLYCEMIA, WITHOUT LONG-TERM CURRENT USE OF INSULIN (MULTI): ICD-10-CM

## 2024-08-20 DIAGNOSIS — N18.31 STAGE 3A CHRONIC KIDNEY DISEASE (MULTI): ICD-10-CM

## 2024-08-20 DIAGNOSIS — I10 PRIMARY HYPERTENSION: ICD-10-CM

## 2024-08-20 PROCEDURE — 3077F SYST BP >= 140 MM HG: CPT | Performed by: FAMILY MEDICINE

## 2024-08-20 PROCEDURE — 1160F RVW MEDS BY RX/DR IN RCRD: CPT | Performed by: FAMILY MEDICINE

## 2024-08-20 PROCEDURE — 3060F POS MICROALBUMINURIA REV: CPT | Performed by: FAMILY MEDICINE

## 2024-08-20 PROCEDURE — 1159F MED LIST DOCD IN RCRD: CPT | Performed by: FAMILY MEDICINE

## 2024-08-20 PROCEDURE — G0439 PPPS, SUBSEQ VISIT: HCPCS | Performed by: FAMILY MEDICINE

## 2024-08-20 PROCEDURE — 99214 OFFICE O/P EST MOD 30 MIN: CPT | Performed by: FAMILY MEDICINE

## 2024-08-20 PROCEDURE — 3078F DIAST BP <80 MM HG: CPT | Performed by: FAMILY MEDICINE

## 2024-08-20 PROCEDURE — 4010F ACE/ARB THERAPY RXD/TAKEN: CPT | Performed by: FAMILY MEDICINE

## 2024-08-20 PROCEDURE — 1036F TOBACCO NON-USER: CPT | Performed by: FAMILY MEDICINE

## 2024-08-20 PROCEDURE — 1124F ACP DISCUSS-NO DSCNMKR DOCD: CPT | Performed by: FAMILY MEDICINE

## 2024-08-20 PROCEDURE — 1170F FXNL STATUS ASSESSED: CPT | Performed by: FAMILY MEDICINE

## 2024-08-20 RX ORDER — HYDRALAZINE HYDROCHLORIDE 25 MG/1
25 TABLET, FILM COATED ORAL 2 TIMES DAILY
Qty: 60 TABLET | Refills: 2 | Status: SHIPPED | OUTPATIENT
Start: 2024-08-20 | End: 2025-08-20

## 2024-08-20 RX ORDER — METFORMIN HYDROCHLORIDE 500 MG/1
1000 TABLET, EXTENDED RELEASE ORAL DAILY
Qty: 180 TABLET | Refills: 3 | Status: SHIPPED | OUTPATIENT
Start: 2024-08-20 | End: 2025-08-20

## 2024-08-20 ASSESSMENT — ACTIVITIES OF DAILY LIVING (ADL)
DRESSING: INDEPENDENT
BATHING: INDEPENDENT
DOING_HOUSEWORK: INDEPENDENT
MANAGING_FINANCES: INDEPENDENT
GROCERY_SHOPPING: INDEPENDENT
TAKING_MEDICATION: INDEPENDENT

## 2024-08-20 ASSESSMENT — ENCOUNTER SYMPTOMS: HYPERTENSION: 1

## 2024-08-20 NOTE — PROGRESS NOTES
Subjective   Patient ID: Garcia Haskins is a 73 y.o. male who presents for Medicare Annual Wellness Visit Subsequent, Diabetes, Hypertension, Hypothyroidism, and Hyperlipidemia.    Diabetes    Hypertension    Hyperlipidemia       Living will hpoa none   Code  full     Does not have car    Ckd 3a      followed by nephrology  Dr Rachel      nocturia up  to 6   Flomax did not help and does not want to see urology      HTN  borderline control followed by renal      State chlorthalidone and calcitriol dc'ed per renal but unclear from last note       Has not seen recently  due to transportation issue      Will start hydralazine      PVD   2022 PAD test borderline/normal    patient is already on Clopidogrel and statin therapy.      Patient is a diabetic he is on Metformin   a1c pending for today  Checks bs three times per week      180      Dr Rachel started jardiance since last OV   last eye exam more than 3 yr ago      Former smoker quit 2002   No longer going to [facility]     H/o cva on plavix     Unable to tolerate asa gi ulcer    No melena but with iron is dark     In blood in the stool       Review of Systems    Objective   /70   Pulse 76   Wt 72.1 kg (159 lb)   SpO2 97%   BMI 23.48 kg/m²     Physical Exam  Constitutional:       Appearance: Normal appearance.   HENT:      Head: Normocephalic and atraumatic.   Eyes:      Conjunctiva/sclera: Conjunctivae normal.      Pupils: Pupils are equal, round, and reactive to light.   Cardiovascular:      Rate and Rhythm: Normal rate and regular rhythm.      Heart sounds: Normal heart sounds.   Pulmonary:      Effort: Pulmonary effort is normal.      Breath sounds: Normal breath sounds.   Musculoskeletal:      Right lower leg: No edema.      Left lower leg: No edema.   Lymphadenopathy:      Cervical: No cervical adenopathy.   Skin:     Coloration: Skin is not jaundiced.   Neurological:      General: No focal deficit present.      Mental Status: He is alert and oriented to  person, place, and time.   Psychiatric:         Mood and Affect: Mood normal.         Behavior: Behavior normal.         Thought Content: Thought content normal.         Judgment: Judgment normal.         Assessment/Plan   Diagnoses and all orders for this visit:  Routine general medical examination at health care facility  Type 2 diabetes mellitus with hyperglycemia, without long-term current use of insulin (Multi)  -     metFORMIN  mg 24 hr tablet; Take 2 tablets (1,000 mg) by mouth once daily.  -     Hemoglobin A1C; Future  -     Basic Metabolic Panel; Future  -     CBC; Future  Peripheral vascular disease (CMS-HCC)  Hyperparathyroidism due to renal insufficiency (Multi)  Stage 3a chronic kidney disease (Multi)  Acquired hypothyroidism  -     TSH with reflex to Free T4 if abnormal; Future  Primary hypertension  -     hydrALAZINE (Apresoline) 25 mg tablet; Take 1 tablet (25 mg) by mouth 2 times a day.

## 2024-08-22 ENCOUNTER — LAB (OUTPATIENT)
Dept: LAB | Facility: LAB | Age: 74
End: 2024-08-22
Payer: MEDICARE

## 2024-08-22 DIAGNOSIS — E03.9 ACQUIRED HYPOTHYROIDISM: ICD-10-CM

## 2024-08-22 DIAGNOSIS — E11.65 TYPE 2 DIABETES MELLITUS WITH HYPERGLYCEMIA, WITHOUT LONG-TERM CURRENT USE OF INSULIN (MULTI): ICD-10-CM

## 2024-08-22 LAB
ANION GAP SERPL CALC-SCNC: 17 MMOL/L (ref 10–20)
BUN SERPL-MCNC: 31 MG/DL (ref 6–23)
CALCIUM SERPL-MCNC: 6.3 MG/DL (ref 8.6–10.3)
CHLORIDE SERPL-SCNC: 99 MMOL/L (ref 98–107)
CO2 SERPL-SCNC: 27 MMOL/L (ref 21–32)
CREAT SERPL-MCNC: 1.84 MG/DL (ref 0.5–1.3)
EGFRCR SERPLBLD CKD-EPI 2021: 38 ML/MIN/1.73M*2
ERYTHROCYTE [DISTWIDTH] IN BLOOD BY AUTOMATED COUNT: 16.1 % (ref 11.5–14.5)
GLUCOSE SERPL-MCNC: 120 MG/DL (ref 74–99)
HCT VFR BLD AUTO: 32.3 % (ref 41–52)
HGB BLD-MCNC: 10 G/DL (ref 13.5–17.5)
MCH RBC QN AUTO: 25.1 PG (ref 26–34)
MCHC RBC AUTO-ENTMCNC: 31 G/DL (ref 32–36)
MCV RBC AUTO: 81 FL (ref 80–100)
NRBC BLD-RTO: 0 /100 WBCS (ref 0–0)
PLATELET # BLD AUTO: 386 X10*3/UL (ref 150–450)
POTASSIUM SERPL-SCNC: 3.5 MMOL/L (ref 3.5–5.3)
RBC # BLD AUTO: 3.99 X10*6/UL (ref 4.5–5.9)
SODIUM SERPL-SCNC: 139 MMOL/L (ref 136–145)
TSH SERPL-ACNC: 0.75 MIU/L (ref 0.44–3.98)
WBC # BLD AUTO: 12.3 X10*3/UL (ref 4.4–11.3)

## 2024-08-22 PROCEDURE — 83036 HEMOGLOBIN GLYCOSYLATED A1C: CPT

## 2024-08-22 PROCEDURE — 85027 COMPLETE CBC AUTOMATED: CPT

## 2024-08-22 PROCEDURE — 84443 ASSAY THYROID STIM HORMONE: CPT

## 2024-08-22 PROCEDURE — 36415 COLL VENOUS BLD VENIPUNCTURE: CPT

## 2024-08-22 PROCEDURE — 80048 BASIC METABOLIC PNL TOTAL CA: CPT

## 2024-08-23 LAB
EST. AVERAGE GLUCOSE BLD GHB EST-MCNC: 146 MG/DL
HBA1C MFR BLD: 6.7 %

## 2024-08-23 NOTE — RESULT ENCOUNTER NOTE
Let pt know he is anemic.  Is he taking iron if not start otc 325 mg daily with vitamin c 250 mg daily.    Blood sugar is normal

## 2024-10-21 DIAGNOSIS — I10 PRIMARY HYPERTENSION: ICD-10-CM

## 2024-10-21 RX ORDER — HYDRALAZINE HYDROCHLORIDE 25 MG/1
25 TABLET, FILM COATED ORAL 2 TIMES DAILY
Qty: 180 TABLET | Refills: 3 | OUTPATIENT
Start: 2024-10-21

## 2024-11-13 DIAGNOSIS — N18.31 STAGE 3A CHRONIC KIDNEY DISEASE (MULTI): ICD-10-CM

## 2024-11-19 ENCOUNTER — APPOINTMENT (OUTPATIENT)
Dept: NEPHROLOGY | Facility: CLINIC | Age: 74
End: 2024-11-19
Payer: MEDICARE

## 2024-11-22 ENCOUNTER — LAB (OUTPATIENT)
Dept: LAB | Facility: LAB | Age: 74
End: 2024-11-22
Payer: MEDICARE

## 2024-11-22 DIAGNOSIS — N18.31 STAGE 3A CHRONIC KIDNEY DISEASE (MULTI): ICD-10-CM

## 2024-11-22 LAB
ANION GAP SERPL CALC-SCNC: 12 MMOL/L (ref 10–20)
BUN SERPL-MCNC: 25 MG/DL (ref 6–23)
CALCIUM SERPL-MCNC: 6 MG/DL (ref 8.6–10.3)
CHLORIDE SERPL-SCNC: 98 MMOL/L (ref 98–107)
CO2 SERPL-SCNC: 30 MMOL/L (ref 21–32)
CREAT SERPL-MCNC: 2.42 MG/DL (ref 0.5–1.3)
EGFRCR SERPLBLD CKD-EPI 2021: 27 ML/MIN/1.73M*2
GLUCOSE SERPL-MCNC: 116 MG/DL (ref 74–99)
POTASSIUM SERPL-SCNC: 3.4 MMOL/L (ref 3.5–5.3)
SODIUM SERPL-SCNC: 137 MMOL/L (ref 136–145)

## 2024-11-22 PROCEDURE — 36415 COLL VENOUS BLD VENIPUNCTURE: CPT

## 2024-11-22 PROCEDURE — 80048 BASIC METABOLIC PNL TOTAL CA: CPT

## 2024-11-22 PROCEDURE — 83970 ASSAY OF PARATHORMONE: CPT

## 2024-11-23 LAB — PTH-INTACT SERPL-MCNC: <6.3 PG/ML (ref 18.5–88)

## 2024-11-25 ENCOUNTER — APPOINTMENT (OUTPATIENT)
Dept: NEPHROLOGY | Facility: CLINIC | Age: 74
End: 2024-11-25
Payer: MEDICARE

## 2024-11-26 ENCOUNTER — LAB (OUTPATIENT)
Dept: LAB | Facility: LAB | Age: 74
End: 2024-11-26
Payer: MEDICARE

## 2024-11-26 ENCOUNTER — APPOINTMENT (OUTPATIENT)
Dept: NEPHROLOGY | Facility: CLINIC | Age: 74
End: 2024-11-26
Payer: MEDICARE

## 2024-11-26 DIAGNOSIS — N18.31 STAGE 3A CHRONIC KIDNEY DISEASE (MULTI): ICD-10-CM

## 2024-11-26 LAB
AMORPH CRY #/AREA UR COMP ASSIST: NORMAL /HPF
APPEARANCE UR: CLEAR
BILIRUB UR STRIP.AUTO-MCNC: NEGATIVE MG/DL
COLOR UR: COLORLESS
GLUCOSE UR STRIP.AUTO-MCNC: NORMAL MG/DL
KETONES UR STRIP.AUTO-MCNC: NEGATIVE MG/DL
LEUKOCYTE ESTERASE UR QL STRIP.AUTO: NEGATIVE
MUCOUS THREADS #/AREA URNS AUTO: NORMAL /LPF
NITRITE UR QL STRIP.AUTO: NEGATIVE
PH UR STRIP.AUTO: 7.5 [PH]
PROT UR STRIP.AUTO-MCNC: ABNORMAL MG/DL
RBC # UR STRIP.AUTO: NEGATIVE /UL
RBC #/AREA URNS AUTO: NORMAL /HPF
SP GR UR STRIP.AUTO: 1.01
UROBILINOGEN UR STRIP.AUTO-MCNC: NORMAL MG/DL
WBC #/AREA URNS AUTO: NORMAL /HPF

## 2024-11-26 PROCEDURE — 82043 UR ALBUMIN QUANTITATIVE: CPT

## 2024-11-26 PROCEDURE — 81001 URINALYSIS AUTO W/SCOPE: CPT

## 2024-11-26 PROCEDURE — 82570 ASSAY OF URINE CREATININE: CPT

## 2024-11-27 ENCOUNTER — APPOINTMENT (OUTPATIENT)
Dept: NEPHROLOGY | Facility: CLINIC | Age: 74
End: 2024-11-27
Payer: MEDICARE

## 2024-11-27 VITALS
BODY MASS INDEX: 24.14 KG/M2 | DIASTOLIC BLOOD PRESSURE: 74 MMHG | SYSTOLIC BLOOD PRESSURE: 166 MMHG | HEIGHT: 69 IN | WEIGHT: 163 LBS | HEART RATE: 74 BPM

## 2024-11-27 DIAGNOSIS — R80.9 PROTEINURIA, UNSPECIFIED TYPE: ICD-10-CM

## 2024-11-27 DIAGNOSIS — N17.9 ACUTE KIDNEY INJURY (CMS-HCC): ICD-10-CM

## 2024-11-27 DIAGNOSIS — I10 PRIMARY HYPERTENSION: Primary | ICD-10-CM

## 2024-11-27 DIAGNOSIS — E11.65 TYPE 2 DIABETES MELLITUS WITH HYPERGLYCEMIA, WITHOUT LONG-TERM CURRENT USE OF INSULIN: ICD-10-CM

## 2024-11-27 DIAGNOSIS — E20.9 HYPOPARATHYROIDISM, UNSPECIFIED HYPOPARATHYROIDISM TYPE (MULTI): ICD-10-CM

## 2024-11-27 LAB
CREAT UR-MCNC: 22.7 MG/DL (ref 20–370)
MICROALBUMIN UR-MCNC: 774.7 MG/L
MICROALBUMIN/CREAT UR: 3412.8 UG/MG CREAT

## 2024-11-27 PROCEDURE — 1159F MED LIST DOCD IN RCRD: CPT | Performed by: CLINICAL NURSE SPECIALIST

## 2024-11-27 PROCEDURE — 3077F SYST BP >= 140 MM HG: CPT | Performed by: CLINICAL NURSE SPECIALIST

## 2024-11-27 PROCEDURE — 3062F POS MACROALBUMINURIA REV: CPT | Performed by: CLINICAL NURSE SPECIALIST

## 2024-11-27 PROCEDURE — 3044F HG A1C LEVEL LT 7.0%: CPT | Performed by: CLINICAL NURSE SPECIALIST

## 2024-11-27 PROCEDURE — 99214 OFFICE O/P EST MOD 30 MIN: CPT | Performed by: CLINICAL NURSE SPECIALIST

## 2024-11-27 PROCEDURE — 1160F RVW MEDS BY RX/DR IN RCRD: CPT | Performed by: CLINICAL NURSE SPECIALIST

## 2024-11-27 PROCEDURE — 3078F DIAST BP <80 MM HG: CPT | Performed by: CLINICAL NURSE SPECIALIST

## 2024-11-27 PROCEDURE — 1036F TOBACCO NON-USER: CPT | Performed by: CLINICAL NURSE SPECIALIST

## 2024-11-27 RX ORDER — HYDRALAZINE HYDROCHLORIDE 50 MG/1
50 TABLET, FILM COATED ORAL 3 TIMES DAILY
Qty: 90 TABLET | Refills: 11 | Status: SHIPPED | OUTPATIENT
Start: 2024-11-27 | End: 2025-11-27

## 2024-11-27 ASSESSMENT — ENCOUNTER SYMPTOMS
MUSCULOSKELETAL NEGATIVE: 1
CONSTITUTIONAL NEGATIVE: 1
GASTROINTESTINAL NEGATIVE: 1
PSYCHIATRIC NEGATIVE: 1
NUMBNESS: 1
RESPIRATORY NEGATIVE: 1
FREQUENCY: 1
ENDOCRINE NEGATIVE: 1

## 2024-11-27 NOTE — ASSESSMENT & PLAN NOTE
Baseline creatinine in the past has been about 1.8, this is what it was 3 months ago and for us about a year ago, is currently up to 2.4, blood pressure seems to be fairly well-controlled, he has significant proteinuria, he has frequency of urination, last hemoglobin A1c was 7.3, he is not using NSAIDs, he is positive for some peripheral edema, will get renal ultrasound, discontinue losartan, increase hydralazine, hold metformin, will follow-up in 2 weeks in the office, repeat labs in 1 week

## 2024-11-27 NOTE — PROGRESS NOTES
Subjective   Patient ID: Garcia Haskins is a 74 y.o. male who presents for Follow-up (Review labs).  Patient being seen in follow-up secondary to chronic kidney disease stage III with history of diabetes type 2, hypertension and vague right kidney    Labs reviewed  Albumin creatinine ratio 3412.8  Urine positive for proteins otherwise within normal limits  PTH less than 6.3  BMP with glucose 116  Sodium 137, potassium 3.4, chloride 98, bicarb 30  Renal functions BUN of 25 and creatinine of 2.42  He has had worsening of his renal function over the last 3 months, prior to that he was 1.84    He is complaining of increase in his neuropathy  He is also complaining that he has had vision changes recently and has not seen his eye doctor  He has increased edema of his lower extremities  He does not notice bubbling and frothiness of his urine  He checks his blood pressure occasionally at home with blood pressures running in the 120s to 130s at home  He is not sure how well his diabetes is controlled  He has run out of Jardiance  He has frequent urination and is getting up about once every hour at night to go to the bathroom          Review of Systems   Constitutional: Negative.    Respiratory: Negative.     Cardiovascular:  Positive for leg swelling.   Gastrointestinal: Negative.    Endocrine: Negative.    Genitourinary:  Positive for frequency.   Musculoskeletal: Negative.    Skin: Negative.    Neurological:  Positive for numbness.   Psychiatric/Behavioral: Negative.         Objective   Physical Exam  Vitals reviewed.   Constitutional:       Appearance: Normal appearance.   HENT:      Head: Normocephalic.   Cardiovascular:      Rate and Rhythm: Normal rate and regular rhythm.   Pulmonary:      Effort: Pulmonary effort is normal.      Breath sounds: Normal breath sounds.   Abdominal:      Palpations: Abdomen is soft.   Musculoskeletal:         General: Normal range of motion.      Right lower leg: Edema (+1) present.       Left lower leg: Edema (+1-2, left greater than right) present.   Skin:     General: Skin is warm and dry.   Neurological:      Mental Status: He is alert and oriented to person, place, and time.   Psychiatric:         Mood and Affect: Mood normal.         Behavior: Behavior normal.         Assessment/Plan   Problem List Items Addressed This Visit             ICD-10-CM    Type 2 diabetes mellitus with hyperglycemia, without long-term current use of insulin E11.65     Last hemoglobin A1c was 7.3, he is not taking the Jardiance at this time as he ran out of it, I will reorder this, I have asked him to hold his metformin secondary to his GFR being less than 30, may be able to restart this once we determine the cause of his acute kidney injury         Relevant Medications    empagliflozin (Jardiance) 10 mg    Primary hypertension - Primary I10     Blood pressure is slightly high in the office today however he states that is good at home, I have asked him to continue to monitor his blood pressure, at this time we will discontinue his losartan, because we are discontinuing this I will increase his hydralazine to 50 mg 3 times a day, he will continue on his nifedipine 90 mg daily         Relevant Medications    hydrALAZINE (Apresoline) 50 mg tablet    Other Relevant Orders    Comprehensive metabolic panel    Follow Up In Nephrology    Hypoparathyroidism E20.9    Acute kidney injury (CMS-HCC) N17.9     Baseline creatinine in the past has been about 1.8, this is what it was 3 months ago and for us about a year ago, is currently up to 2.4, blood pressure seems to be fairly well-controlled, he has significant proteinuria, he has frequency of urination, last hemoglobin A1c was 7.3, he is not using NSAIDs, he is positive for some peripheral edema, will get renal ultrasound, discontinue losartan, increase hydralazine, hold metformin, will follow-up in 2 weeks in the office, repeat labs in 1 week          Other Visit Diagnoses          Codes    Proteinuria, unspecified type     R80.9    Relevant Orders    US renal complete    Magnesium    Phosphorus    CBC    Uric acid    Comprehensive metabolic panel    Follow Up In Nephrology          CKD 3 with baseline creatinine about 1.8  Right renal cyst  Atrophic right kidney  Anemia of chronic disease  Type 2 diabetes  Microalbuminuria  Hypertension: Resistant  Hyperlipidemia  Hypokalemia  Hypocalcemia       Dalila Rachel, SANJANA-PASCUAL, DNP 11/27/24 1:42 PM

## 2024-11-27 NOTE — PATIENT INSTRUCTIONS
Stop losartan  Increase hydralazine to 50 mg three times day  Hold metformin  Lab work in one week

## 2024-11-27 NOTE — ASSESSMENT & PLAN NOTE
Last hemoglobin A1c was 7.3, he is not taking the Jardiance at this time as he ran out of it, I will reorder this, I have asked him to hold his metformin secondary to his GFR being less than 30, may be able to restart this once we determine the cause of his acute kidney injury

## 2024-11-27 NOTE — ASSESSMENT & PLAN NOTE
Blood pressure is slightly high in the office today however he states that is good at home, I have asked him to continue to monitor his blood pressure, at this time we will discontinue his losartan, because we are discontinuing this I will increase his hydralazine to 50 mg 3 times a day, he will continue on his nifedipine 90 mg daily

## 2024-11-29 ENCOUNTER — HOSPITAL ENCOUNTER (OUTPATIENT)
Dept: RADIOLOGY | Facility: HOSPITAL | Age: 74
Discharge: HOME | End: 2024-11-29
Payer: MEDICARE

## 2024-11-29 DIAGNOSIS — R80.9 PROTEINURIA, UNSPECIFIED TYPE: ICD-10-CM

## 2024-11-29 PROCEDURE — 76770 US EXAM ABDO BACK WALL COMP: CPT

## 2024-12-12 ENCOUNTER — LAB (OUTPATIENT)
Dept: LAB | Facility: LAB | Age: 74
End: 2024-12-12
Payer: MEDICARE

## 2024-12-12 DIAGNOSIS — I10 PRIMARY HYPERTENSION: ICD-10-CM

## 2024-12-12 DIAGNOSIS — R80.9 PROTEINURIA, UNSPECIFIED TYPE: ICD-10-CM

## 2024-12-12 LAB
ALBUMIN SERPL BCP-MCNC: 3.2 G/DL (ref 3.4–5)
ALP SERPL-CCNC: 300 U/L (ref 33–136)
ALT SERPL W P-5'-P-CCNC: 28 U/L (ref 10–52)
ANION GAP SERPL CALC-SCNC: 16 MMOL/L (ref 10–20)
AST SERPL W P-5'-P-CCNC: 32 U/L (ref 9–39)
BILIRUB SERPL-MCNC: 0.3 MG/DL (ref 0–1.2)
BUN SERPL-MCNC: 24 MG/DL (ref 6–23)
CALCIUM SERPL-MCNC: 4.8 MG/DL (ref 8.6–10.3)
CHLORIDE SERPL-SCNC: 100 MMOL/L (ref 98–107)
CO2 SERPL-SCNC: 23 MMOL/L (ref 21–32)
CREAT SERPL-MCNC: 2.72 MG/DL (ref 0.5–1.3)
EGFRCR SERPLBLD CKD-EPI 2021: 24 ML/MIN/1.73M*2
ERYTHROCYTE [DISTWIDTH] IN BLOOD BY AUTOMATED COUNT: 17.2 % (ref 11.5–14.5)
GLUCOSE SERPL-MCNC: 179 MG/DL (ref 74–99)
HCT VFR BLD AUTO: 25.3 % (ref 41–52)
HGB BLD-MCNC: 8.2 G/DL (ref 13.5–17.5)
MAGNESIUM SERPL-MCNC: 1.55 MG/DL (ref 1.6–2.4)
MCH RBC QN AUTO: 25.2 PG (ref 26–34)
MCHC RBC AUTO-ENTMCNC: 32.4 G/DL (ref 32–36)
MCV RBC AUTO: 78 FL (ref 80–100)
NRBC BLD-RTO: 0 /100 WBCS (ref 0–0)
PHOSPHATE SERPL-MCNC: 4.6 MG/DL (ref 2.5–4.9)
PLATELET # BLD AUTO: 338 X10*3/UL (ref 150–450)
POTASSIUM SERPL-SCNC: 3.1 MMOL/L (ref 3.5–5.3)
PROT SERPL-MCNC: 6.1 G/DL (ref 6.4–8.2)
RBC # BLD AUTO: 3.26 X10*6/UL (ref 4.5–5.9)
SODIUM SERPL-SCNC: 136 MMOL/L (ref 136–145)
URATE SERPL-MCNC: 4.5 MG/DL (ref 4–7.5)
WBC # BLD AUTO: 13.9 X10*3/UL (ref 4.4–11.3)

## 2024-12-12 PROCEDURE — 36415 COLL VENOUS BLD VENIPUNCTURE: CPT

## 2024-12-12 PROCEDURE — 83735 ASSAY OF MAGNESIUM: CPT

## 2024-12-12 PROCEDURE — 84550 ASSAY OF BLOOD/URIC ACID: CPT

## 2024-12-12 PROCEDURE — 85027 COMPLETE CBC AUTOMATED: CPT

## 2024-12-12 PROCEDURE — 80053 COMPREHEN METABOLIC PANEL: CPT

## 2024-12-12 PROCEDURE — 84100 ASSAY OF PHOSPHORUS: CPT

## 2024-12-13 ENCOUNTER — APPOINTMENT (OUTPATIENT)
Dept: RADIOLOGY | Facility: HOSPITAL | Age: 74
End: 2024-12-13
Payer: MEDICARE

## 2024-12-13 ENCOUNTER — HOSPITAL ENCOUNTER (INPATIENT)
Facility: HOSPITAL | Age: 74
DRG: 682 | End: 2024-12-13
Attending: EMERGENCY MEDICINE | Admitting: HOSPITALIST
Payer: MEDICARE

## 2024-12-13 ENCOUNTER — APPOINTMENT (OUTPATIENT)
Dept: CARDIOLOGY | Facility: HOSPITAL | Age: 74
End: 2024-12-13
Payer: MEDICARE

## 2024-12-13 DIAGNOSIS — R05.3 CHRONIC COUGH: ICD-10-CM

## 2024-12-13 DIAGNOSIS — E87.6 HYPOKALEMIA: Primary | ICD-10-CM

## 2024-12-13 DIAGNOSIS — N17.9 ACUTE KIDNEY INJURY (CMS-HCC): ICD-10-CM

## 2024-12-13 DIAGNOSIS — E11.65 TYPE 2 DIABETES MELLITUS WITH HYPERGLYCEMIA, WITHOUT LONG-TERM CURRENT USE OF INSULIN: ICD-10-CM

## 2024-12-13 DIAGNOSIS — E20.9 HYPOPARATHYROIDISM, UNSPECIFIED HYPOPARATHYROIDISM TYPE (MULTI): ICD-10-CM

## 2024-12-13 DIAGNOSIS — J18.9 PNEUMONIA OF RIGHT LOWER LOBE DUE TO INFECTIOUS ORGANISM: ICD-10-CM

## 2024-12-13 DIAGNOSIS — E83.51 HYPOCALCEMIA: ICD-10-CM

## 2024-12-13 DIAGNOSIS — N17.9 AKI (ACUTE KIDNEY INJURY) (CMS-HCC): ICD-10-CM

## 2024-12-13 DIAGNOSIS — I10 PRIMARY HYPERTENSION: ICD-10-CM

## 2024-12-13 DIAGNOSIS — E83.42 HYPOMAGNESEMIA: ICD-10-CM

## 2024-12-13 LAB
ALBUMIN SERPL BCP-MCNC: 3.3 G/DL (ref 3.4–5)
ALP SERPL-CCNC: 323 U/L (ref 33–136)
ALT SERPL W P-5'-P-CCNC: 31 U/L (ref 10–52)
ANION GAP SERPL CALC-SCNC: 13 MMOL/L (ref 10–20)
ANION GAP SERPL CALC-SCNC: 16 MMOL/L
APPEARANCE UR: CLEAR
AST SERPL W P-5'-P-CCNC: 42 U/L (ref 9–39)
BACTERIA #/AREA URNS AUTO: ABNORMAL /HPF
BASOPHILS # BLD AUTO: 0.1 X10*3/UL (ref 0–0.1)
BASOPHILS NFR BLD AUTO: 0.5 %
BILIRUB SERPL-MCNC: 0.4 MG/DL (ref 0–1.2)
BILIRUB UR STRIP.AUTO-MCNC: NEGATIVE MG/DL
BUN SERPL-MCNC: 24 MG/DL (ref 6–23)
BUN SERPL-MCNC: 25 MG/DL (ref 6–23)
CA-I BLD-SCNC: 0.58 MMOL/L (ref 1.1–1.33)
CA-I BLD-SCNC: 0.61 MMOL/L (ref 1.1–1.33)
CALCIUM SERPL-MCNC: 5 MG/DL (ref 8.6–10.3)
CALCIUM SERPL-MCNC: 5.2 MG/DL (ref 8.6–10.3)
CHLORIDE SERPL-SCNC: 100 MMOL/L (ref 98–107)
CHLORIDE SERPL-SCNC: 98 MMOL/L (ref 98–107)
CO2 SERPL-SCNC: 21 MMOL/L (ref 21–32)
CO2 SERPL-SCNC: 24 MMOL/L (ref 21–32)
COLOR UR: ABNORMAL
CREAT SERPL-MCNC: 2.72 MG/DL (ref 0.5–1.3)
CREAT SERPL-MCNC: 2.94 MG/DL (ref 0.5–1.3)
EGFRCR SERPLBLD CKD-EPI 2021: 22 ML/MIN/1.73M*2
EGFRCR SERPLBLD CKD-EPI 2021: 24 ML/MIN/1.73M*2
EOSINOPHIL # BLD AUTO: 0.1 X10*3/UL (ref 0–0.4)
EOSINOPHIL NFR BLD AUTO: 0.5 %
ERYTHROCYTE [DISTWIDTH] IN BLOOD BY AUTOMATED COUNT: 16.9 % (ref 11.5–14.5)
GLUCOSE SERPL-MCNC: 132 MG/DL (ref 74–99)
GLUCOSE SERPL-MCNC: 153 MG/DL (ref 74–99)
GLUCOSE UR STRIP.AUTO-MCNC: ABNORMAL MG/DL
HCT VFR BLD AUTO: 24.6 % (ref 41–52)
HGB BLD-MCNC: 8.1 G/DL (ref 13.5–17.5)
IMM GRANULOCYTES # BLD AUTO: 0.1 X10*3/UL (ref 0–0.5)
IMM GRANULOCYTES NFR BLD AUTO: 0.5 % (ref 0–0.9)
KETONES UR STRIP.AUTO-MCNC: NEGATIVE MG/DL
LACTATE SERPL-SCNC: 0.7 MMOL/L (ref 0.4–2)
LEUKOCYTE ESTERASE UR QL STRIP.AUTO: NEGATIVE
LYMPHOCYTES # BLD AUTO: 0.62 X10*3/UL (ref 0.8–3)
LYMPHOCYTES NFR BLD AUTO: 3.2 %
MAGNESIUM SERPL-MCNC: 1.46 MG/DL (ref 1.6–2.4)
MCH RBC QN AUTO: 24.9 PG (ref 26–34)
MCHC RBC AUTO-ENTMCNC: 32.9 G/DL (ref 32–36)
MCV RBC AUTO: 76 FL (ref 80–100)
MONOCYTES # BLD AUTO: 1.08 X10*3/UL (ref 0.05–0.8)
MONOCYTES NFR BLD AUTO: 5.5 %
MUCOUS THREADS #/AREA URNS AUTO: ABNORMAL /LPF
NEUTROPHILS # BLD AUTO: 17.62 X10*3/UL (ref 1.6–5.5)
NEUTROPHILS NFR BLD AUTO: 89.8 %
NITRITE UR QL STRIP.AUTO: NEGATIVE
NRBC BLD-RTO: 0 /100 WBCS (ref 0–0)
PH UR STRIP.AUTO: 6.5 [PH]
PLATELET # BLD AUTO: 254 X10*3/UL (ref 150–450)
POTASSIUM SERPL-SCNC: 3 MMOL/L (ref 3.5–5.3)
POTASSIUM SERPL-SCNC: 3.7 MMOL/L (ref 3.5–5.3)
PROT SERPL-MCNC: 6.6 G/DL (ref 6.4–8.2)
PROT UR STRIP.AUTO-MCNC: ABNORMAL MG/DL
RBC # BLD AUTO: 3.25 X10*6/UL (ref 4.5–5.9)
RBC # UR STRIP.AUTO: NEGATIVE /UL
RBC #/AREA URNS AUTO: ABNORMAL /HPF
SODIUM SERPL-SCNC: 131 MMOL/L (ref 136–145)
SODIUM SERPL-SCNC: 134 MMOL/L (ref 136–145)
SP GR UR STRIP.AUTO: 1.02
UROBILINOGEN UR STRIP.AUTO-MCNC: NORMAL MG/DL
WBC # BLD AUTO: 19.6 X10*3/UL (ref 4.4–11.3)
WBC #/AREA URNS AUTO: ABNORMAL /HPF

## 2024-12-13 PROCEDURE — 2500000002 HC RX 250 W HCPCS SELF ADMINISTERED DRUGS (ALT 637 FOR MEDICARE OP, ALT 636 FOR OP/ED): Performed by: HOSPITALIST

## 2024-12-13 PROCEDURE — 2500000004 HC RX 250 GENERAL PHARMACY W/ HCPCS (ALT 636 FOR OP/ED): Mod: JZ | Performed by: INTERNAL MEDICINE

## 2024-12-13 PROCEDURE — 96365 THER/PROPH/DIAG IV INF INIT: CPT

## 2024-12-13 PROCEDURE — 82330 ASSAY OF CALCIUM: CPT | Performed by: NURSE PRACTITIONER

## 2024-12-13 PROCEDURE — 93005 ELECTROCARDIOGRAM TRACING: CPT

## 2024-12-13 PROCEDURE — 81001 URINALYSIS AUTO W/SCOPE: CPT | Performed by: NURSE PRACTITIONER

## 2024-12-13 PROCEDURE — 96375 TX/PRO/DX INJ NEW DRUG ADDON: CPT

## 2024-12-13 PROCEDURE — 85025 COMPLETE CBC W/AUTO DIFF WBC: CPT | Performed by: NURSE PRACTITIONER

## 2024-12-13 PROCEDURE — 73030 X-RAY EXAM OF SHOULDER: CPT | Mod: RT

## 2024-12-13 PROCEDURE — 2500000002 HC RX 250 W HCPCS SELF ADMINISTERED DRUGS (ALT 637 FOR MEDICARE OP, ALT 636 FOR OP/ED): Performed by: NURSE PRACTITIONER

## 2024-12-13 PROCEDURE — 83970 ASSAY OF PARATHORMONE: CPT | Mod: SAMLAB | Performed by: HOSPITALIST

## 2024-12-13 PROCEDURE — 99223 1ST HOSP IP/OBS HIGH 75: CPT | Performed by: HOSPITALIST

## 2024-12-13 PROCEDURE — 2500000005 HC RX 250 GENERAL PHARMACY W/O HCPCS: Performed by: EMERGENCY MEDICINE

## 2024-12-13 PROCEDURE — 99285 EMERGENCY DEPT VISIT HI MDM: CPT | Performed by: EMERGENCY MEDICINE

## 2024-12-13 PROCEDURE — 36415 COLL VENOUS BLD VENIPUNCTURE: CPT | Performed by: NURSE PRACTITIONER

## 2024-12-13 PROCEDURE — 83605 ASSAY OF LACTIC ACID: CPT | Performed by: NURSE PRACTITIONER

## 2024-12-13 PROCEDURE — 82330 ASSAY OF CALCIUM: CPT | Performed by: INTERNAL MEDICINE

## 2024-12-13 PROCEDURE — 82652 VIT D 1 25-DIHYDROXY: CPT | Performed by: HOSPITALIST

## 2024-12-13 PROCEDURE — 94664 DEMO&/EVAL PT USE INHALER: CPT

## 2024-12-13 PROCEDURE — 84145 PROCALCITONIN (PCT): CPT | Mod: SAMLAB | Performed by: HOSPITALIST

## 2024-12-13 PROCEDURE — 1200000002 HC GENERAL ROOM WITH TELEMETRY DAILY

## 2024-12-13 PROCEDURE — 80048 BASIC METABOLIC PNL TOTAL CA: CPT | Mod: CCI | Performed by: HOSPITALIST

## 2024-12-13 PROCEDURE — 73030 X-RAY EXAM OF SHOULDER: CPT | Mod: RIGHT SIDE | Performed by: RADIOLOGY

## 2024-12-13 PROCEDURE — 87040 BLOOD CULTURE FOR BACTERIA: CPT | Mod: SAMLAB | Performed by: NURSE PRACTITIONER

## 2024-12-13 PROCEDURE — 94762 N-INVAS EAR/PLS OXIMTRY CONT: CPT

## 2024-12-13 PROCEDURE — 83735 ASSAY OF MAGNESIUM: CPT | Performed by: NURSE PRACTITIONER

## 2024-12-13 PROCEDURE — 80053 COMPREHEN METABOLIC PANEL: CPT | Performed by: NURSE PRACTITIONER

## 2024-12-13 PROCEDURE — 2500000004 HC RX 250 GENERAL PHARMACY W/ HCPCS (ALT 636 FOR OP/ED): Performed by: HOSPITALIST

## 2024-12-13 PROCEDURE — 94640 AIRWAY INHALATION TREATMENT: CPT

## 2024-12-13 PROCEDURE — 2500000004 HC RX 250 GENERAL PHARMACY W/ HCPCS (ALT 636 FOR OP/ED): Performed by: NURSE PRACTITIONER

## 2024-12-13 PROCEDURE — 71045 X-RAY EXAM CHEST 1 VIEW: CPT

## 2024-12-13 PROCEDURE — 2500000001 HC RX 250 WO HCPCS SELF ADMINISTERED DRUGS (ALT 637 FOR MEDICARE OP): Performed by: HOSPITALIST

## 2024-12-13 PROCEDURE — 71045 X-RAY EXAM CHEST 1 VIEW: CPT | Performed by: RADIOLOGY

## 2024-12-13 RX ORDER — ACETAMINOPHEN 160 MG/5ML
650 SOLUTION ORAL EVERY 4 HOURS PRN
Status: DISCONTINUED | OUTPATIENT
Start: 2024-12-13 | End: 2024-12-20 | Stop reason: HOSPADM

## 2024-12-13 RX ORDER — POTASSIUM CHLORIDE 14.9 MG/ML
20 INJECTION INTRAVENOUS ONCE
Status: COMPLETED | OUTPATIENT
Start: 2024-12-13 | End: 2024-12-13

## 2024-12-13 RX ORDER — POLYETHYLENE GLYCOL 3350 17 G/17G
17 POWDER, FOR SOLUTION ORAL DAILY
Status: DISCONTINUED | OUTPATIENT
Start: 2024-12-13 | End: 2024-12-20 | Stop reason: HOSPADM

## 2024-12-13 RX ORDER — CEFTRIAXONE 2 G/50ML
2 INJECTION, SOLUTION INTRAVENOUS EVERY 24 HOURS
Status: DISCONTINUED | OUTPATIENT
Start: 2024-12-14 | End: 2024-12-16

## 2024-12-13 RX ORDER — ACETAMINOPHEN 325 MG/1
650 TABLET ORAL EVERY 4 HOURS PRN
Status: DISCONTINUED | OUTPATIENT
Start: 2024-12-13 | End: 2024-12-20 | Stop reason: HOSPADM

## 2024-12-13 RX ORDER — AZITHROMYCIN 250 MG/1
500 TABLET, FILM COATED ORAL
Status: DISCONTINUED | OUTPATIENT
Start: 2024-12-14 | End: 2024-12-15 | Stop reason: ALTCHOICE

## 2024-12-13 RX ORDER — POTASSIUM CHLORIDE 750 MG/1
10 TABLET, FILM COATED, EXTENDED RELEASE ORAL 2 TIMES DAILY
Qty: 6 TABLET | Refills: 0 | Status: SHIPPED | OUTPATIENT
Start: 2024-12-13 | End: 2024-12-20 | Stop reason: HOSPADM

## 2024-12-13 RX ORDER — ACETAMINOPHEN 650 MG/1
650 SUPPOSITORY RECTAL EVERY 4 HOURS PRN
Status: DISCONTINUED | OUTPATIENT
Start: 2024-12-13 | End: 2024-12-20 | Stop reason: HOSPADM

## 2024-12-13 RX ORDER — GUAIFENESIN 600 MG/1
600 TABLET, EXTENDED RELEASE ORAL 2 TIMES DAILY PRN
Status: DISCONTINUED | OUTPATIENT
Start: 2024-12-13 | End: 2024-12-20 | Stop reason: HOSPADM

## 2024-12-13 RX ORDER — WATER
25 LIQUID (ML) MISCELLANEOUS
Status: DISCONTINUED | OUTPATIENT
Start: 2024-12-13 | End: 2024-12-17

## 2024-12-13 RX ORDER — CALCIUM GLUCONATE 20 MG/ML
2 INJECTION, SOLUTION INTRAVENOUS ONCE
Status: COMPLETED | OUTPATIENT
Start: 2024-12-13 | End: 2024-12-13

## 2024-12-13 RX ORDER — POTASSIUM CHLORIDE 20 MEQ/1
40 TABLET, EXTENDED RELEASE ORAL ONCE
Status: COMPLETED | OUTPATIENT
Start: 2024-12-13 | End: 2024-12-13

## 2024-12-13 RX ORDER — SODIUM CHLORIDE 9 MG/ML
100 INJECTION, SOLUTION INTRAVENOUS CONTINUOUS
Status: DISCONTINUED | OUTPATIENT
Start: 2024-12-13 | End: 2024-12-14

## 2024-12-13 RX ORDER — CALCIUM CARBONATE 500(1250)
1250 TABLET ORAL DAILY
Status: DISCONTINUED | OUTPATIENT
Start: 2024-12-13 | End: 2024-12-13

## 2024-12-13 RX ORDER — POTASSIUM CHLORIDE 750 MG/1
10 TABLET, FILM COATED, EXTENDED RELEASE ORAL 2 TIMES DAILY
Status: DISCONTINUED | OUTPATIENT
Start: 2024-12-13 | End: 2024-12-20 | Stop reason: HOSPADM

## 2024-12-13 RX ORDER — HYDRALAZINE HYDROCHLORIDE 25 MG/1
50 TABLET, FILM COATED ORAL 3 TIMES DAILY
Status: DISCONTINUED | OUTPATIENT
Start: 2024-12-13 | End: 2024-12-15

## 2024-12-13 RX ORDER — FERROUS SULFATE 325(65) MG
65 TABLET ORAL DAILY
Status: DISCONTINUED | OUTPATIENT
Start: 2024-12-14 | End: 2024-12-20 | Stop reason: HOSPADM

## 2024-12-13 RX ORDER — MAGNESIUM SULFATE HEPTAHYDRATE 40 MG/ML
2 INJECTION, SOLUTION INTRAVENOUS ONCE
Status: COMPLETED | OUTPATIENT
Start: 2024-12-13 | End: 2024-12-13

## 2024-12-13 RX ORDER — CEFTRIAXONE 2 G/50ML
2 INJECTION, SOLUTION INTRAVENOUS ONCE
Status: COMPLETED | OUTPATIENT
Start: 2024-12-13 | End: 2024-12-13

## 2024-12-13 RX ORDER — ONDANSETRON HYDROCHLORIDE 2 MG/ML
4 INJECTION, SOLUTION INTRAVENOUS EVERY 8 HOURS PRN
Status: DISCONTINUED | OUTPATIENT
Start: 2024-12-13 | End: 2024-12-20 | Stop reason: HOSPADM

## 2024-12-13 RX ORDER — IPRATROPIUM BROMIDE AND ALBUTEROL SULFATE 2.5; .5 MG/3ML; MG/3ML
3 SOLUTION RESPIRATORY (INHALATION)
Status: DISCONTINUED | OUTPATIENT
Start: 2024-12-13 | End: 2024-12-14

## 2024-12-13 RX ORDER — ENOXAPARIN SODIUM 100 MG/ML
30 INJECTION SUBCUTANEOUS EVERY 24 HOURS
Status: DISCONTINUED | OUTPATIENT
Start: 2024-12-13 | End: 2024-12-20 | Stop reason: HOSPADM

## 2024-12-13 RX ORDER — CLOPIDOGREL BISULFATE 75 MG/1
75 TABLET ORAL DAILY
Status: DISCONTINUED | OUTPATIENT
Start: 2024-12-14 | End: 2024-12-20 | Stop reason: HOSPADM

## 2024-12-13 RX ORDER — PANTOPRAZOLE SODIUM 40 MG/1
40 TABLET, DELAYED RELEASE ORAL
Status: DISCONTINUED | OUTPATIENT
Start: 2024-12-14 | End: 2024-12-13

## 2024-12-13 RX ORDER — ONDANSETRON 4 MG/1
4 TABLET, ORALLY DISINTEGRATING ORAL EVERY 8 HOURS PRN
Status: DISCONTINUED | OUTPATIENT
Start: 2024-12-13 | End: 2024-12-20 | Stop reason: HOSPADM

## 2024-12-13 SDOH — ECONOMIC STABILITY: FOOD INSECURITY: WITHIN THE PAST 12 MONTHS, YOU WORRIED THAT YOUR FOOD WOULD RUN OUT BEFORE YOU GOT THE MONEY TO BUY MORE.: NEVER TRUE

## 2024-12-13 SDOH — ECONOMIC STABILITY: HOUSING INSECURITY: IN THE LAST 12 MONTHS, WAS THERE A TIME WHEN YOU WERE NOT ABLE TO PAY THE MORTGAGE OR RENT ON TIME?: NO

## 2024-12-13 SDOH — SOCIAL STABILITY: SOCIAL INSECURITY: HAVE YOU HAD ANY THOUGHTS OF HARMING ANYONE ELSE?: NO

## 2024-12-13 SDOH — ECONOMIC STABILITY: FOOD INSECURITY: WITHIN THE PAST 12 MONTHS, THE FOOD YOU BOUGHT JUST DIDN'T LAST AND YOU DIDN'T HAVE MONEY TO GET MORE.: NEVER TRUE

## 2024-12-13 SDOH — SOCIAL STABILITY: SOCIAL INSECURITY: ARE THERE ANY APPARENT SIGNS OF INJURIES/BEHAVIORS THAT COULD BE RELATED TO ABUSE/NEGLECT?: NO

## 2024-12-13 SDOH — ECONOMIC STABILITY: INCOME INSECURITY: IN THE PAST 12 MONTHS HAS THE ELECTRIC, GAS, OIL, OR WATER COMPANY THREATENED TO SHUT OFF SERVICES IN YOUR HOME?: NO

## 2024-12-13 SDOH — SOCIAL STABILITY: SOCIAL INSECURITY
WITHIN THE LAST YEAR, HAVE YOU BEEN KICKED, HIT, SLAPPED, OR OTHERWISE PHYSICALLY HURT BY YOUR PARTNER OR EX-PARTNER?: NO

## 2024-12-13 SDOH — SOCIAL STABILITY: SOCIAL INSECURITY: WITHIN THE LAST YEAR, HAVE YOU BEEN HUMILIATED OR EMOTIONALLY ABUSED IN OTHER WAYS BY YOUR PARTNER OR EX-PARTNER?: NO

## 2024-12-13 SDOH — SOCIAL STABILITY: SOCIAL INSECURITY: WITHIN THE LAST YEAR, HAVE YOU BEEN AFRAID OF YOUR PARTNER OR EX-PARTNER?: NO

## 2024-12-13 SDOH — SOCIAL STABILITY: SOCIAL INSECURITY: DOES ANYONE TRY TO KEEP YOU FROM HAVING/CONTACTING OTHER FRIENDS OR DOING THINGS OUTSIDE YOUR HOME?: NO

## 2024-12-13 SDOH — SOCIAL STABILITY: SOCIAL INSECURITY
WITHIN THE LAST YEAR, HAVE YOU BEEN RAPED OR FORCED TO HAVE ANY KIND OF SEXUAL ACTIVITY BY YOUR PARTNER OR EX-PARTNER?: NO

## 2024-12-13 SDOH — SOCIAL STABILITY: SOCIAL INSECURITY: HAVE YOU HAD THOUGHTS OF HARMING ANYONE ELSE?: NO

## 2024-12-13 SDOH — ECONOMIC STABILITY: FOOD INSECURITY: HOW HARD IS IT FOR YOU TO PAY FOR THE VERY BASICS LIKE FOOD, HOUSING, MEDICAL CARE, AND HEATING?: NOT HARD AT ALL

## 2024-12-13 SDOH — ECONOMIC STABILITY: HOUSING INSECURITY: AT ANY TIME IN THE PAST 12 MONTHS, WERE YOU HOMELESS OR LIVING IN A SHELTER (INCLUDING NOW)?: NO

## 2024-12-13 SDOH — ECONOMIC STABILITY: TRANSPORTATION INSECURITY: IN THE PAST 12 MONTHS, HAS LACK OF TRANSPORTATION KEPT YOU FROM MEDICAL APPOINTMENTS OR FROM GETTING MEDICATIONS?: NO

## 2024-12-13 SDOH — SOCIAL STABILITY: SOCIAL INSECURITY: DO YOU FEEL UNSAFE GOING BACK TO THE PLACE WHERE YOU ARE LIVING?: NO

## 2024-12-13 SDOH — SOCIAL STABILITY: SOCIAL INSECURITY: ABUSE: ADULT

## 2024-12-13 SDOH — SOCIAL STABILITY: SOCIAL INSECURITY: WERE YOU ABLE TO COMPLETE ALL THE BEHAVIORAL HEALTH SCREENINGS?: YES

## 2024-12-13 SDOH — ECONOMIC STABILITY: HOUSING INSECURITY: IN THE PAST 12 MONTHS, HOW MANY TIMES HAVE YOU MOVED WHERE YOU WERE LIVING?: 0

## 2024-12-13 SDOH — SOCIAL STABILITY: SOCIAL INSECURITY: DO YOU FEEL ANYONE HAS EXPLOITED OR TAKEN ADVANTAGE OF YOU FINANCIALLY OR OF YOUR PERSONAL PROPERTY?: NO

## 2024-12-13 SDOH — SOCIAL STABILITY: SOCIAL INSECURITY: ARE YOU OR HAVE YOU BEEN THREATENED OR ABUSED PHYSICALLY, EMOTIONALLY, OR SEXUALLY BY ANYONE?: NO

## 2024-12-13 SDOH — SOCIAL STABILITY: SOCIAL INSECURITY: HAS ANYONE EVER THREATENED TO HURT YOUR FAMILY OR YOUR PETS?: NO

## 2024-12-13 ASSESSMENT — ACTIVITIES OF DAILY LIVING (ADL)
LACK_OF_TRANSPORTATION: NO
LACK_OF_TRANSPORTATION: NO
DRESSING YOURSELF: INDEPENDENT
ADEQUATE_TO_COMPLETE_ADL: YES
BATHING: INDEPENDENT
HEARING - RIGHT EAR: DIFFICULTY WITH NOISE
JUDGMENT_ADEQUATE_SAFELY_COMPLETE_DAILY_ACTIVITIES: YES
PATIENT'S MEMORY ADEQUATE TO SAFELY COMPLETE DAILY ACTIVITIES?: YES
WALKS IN HOME: INDEPENDENT
FEEDING YOURSELF: INDEPENDENT
GROOMING: INDEPENDENT
HEARING - LEFT EAR: DIFFICULTY WITH NOISE
TOILETING: INDEPENDENT

## 2024-12-13 ASSESSMENT — COGNITIVE AND FUNCTIONAL STATUS - GENERAL
MOBILITY SCORE: 21
MOBILITY SCORE: 24
DAILY ACTIVITIY SCORE: 24
PATIENT BASELINE BEDBOUND: NO
DAILY ACTIVITIY SCORE: 24
STANDING UP FROM CHAIR USING ARMS: A LITTLE
CLIMB 3 TO 5 STEPS WITH RAILING: A LITTLE
WALKING IN HOSPITAL ROOM: A LITTLE

## 2024-12-13 ASSESSMENT — LIFESTYLE VARIABLES
SKIP TO QUESTIONS 9-10: 1
HOW OFTEN DO YOU HAVE 6 OR MORE DRINKS ON ONE OCCASION: NEVER
AUDIT-C TOTAL SCORE: 0
HOW MANY STANDARD DRINKS CONTAINING ALCOHOL DO YOU HAVE ON A TYPICAL DAY: PATIENT DOES NOT DRINK
AUDIT-C TOTAL SCORE: 0
HOW OFTEN DO YOU HAVE A DRINK CONTAINING ALCOHOL: NEVER

## 2024-12-13 ASSESSMENT — PATIENT HEALTH QUESTIONNAIRE - PHQ9
SUM OF ALL RESPONSES TO PHQ9 QUESTIONS 1 & 2: 0
1. LITTLE INTEREST OR PLEASURE IN DOING THINGS: NOT AT ALL
2. FEELING DOWN, DEPRESSED OR HOPELESS: NOT AT ALL

## 2024-12-13 ASSESSMENT — COLUMBIA-SUICIDE SEVERITY RATING SCALE - C-SSRS
6. HAVE YOU EVER DONE ANYTHING, STARTED TO DO ANYTHING, OR PREPARED TO DO ANYTHING TO END YOUR LIFE?: NO
2. HAVE YOU ACTUALLY HAD ANY THOUGHTS OF KILLING YOURSELF?: NO
1. IN THE PAST MONTH, HAVE YOU WISHED YOU WERE DEAD OR WISHED YOU COULD GO TO SLEEP AND NOT WAKE UP?: NO

## 2024-12-13 ASSESSMENT — PAIN SCALES - GENERAL
PAINLEVEL_OUTOF10: 0 - NO PAIN

## 2024-12-13 ASSESSMENT — PAIN - FUNCTIONAL ASSESSMENT
PAIN_FUNCTIONAL_ASSESSMENT: 0-10
PAIN_FUNCTIONAL_ASSESSMENT: 0-10

## 2024-12-13 NOTE — ED PROCEDURE NOTE
Procedure  Critical Care    Performed by: SURYA Cason  Authorized by: Adis Haskins DO    Critical care provider statement:     Critical care time (minutes):  20    Critical care time was exclusive of:  Separately billable procedures and treating other patients    Critical care was necessary to treat or prevent imminent or life-threatening deterioration of the following conditions:  Metabolic crisis    Critical care was time spent personally by me on the following activities:  Ordering and review of radiographic studies, ordering and review of laboratory studies, review of old charts, re-evaluation of patient's condition, examination of patient and discussions with consultants    Care discussed with: admitting provider                 SURYA Cason  12/13/24 [id number]

## 2024-12-13 NOTE — ED PROVIDER NOTES
Chief Complaint   Patient presents with    abnormal labs     Patient sent to ED per Dr. Rachel for abnormal labs that were drawn yesterday. Patient denies any new symptoms. States he is just tired which is normal for him.         Patient History    Past Medical History:   Diagnosis Date    Personal history of colonic polyps     History of colonic polyps    Personal history of other diseases of the digestive system     History of upper gastrointestinal hemorrhage    Personal history of other diseases of the nervous system and sense organs     History of peripheral neuropathy    Personal history of transient ischemic attack (TIA), and cerebral infarction without residual deficits 01/27/2022    History of cerebrovascular accident      Past Surgical History:   Procedure Laterality Date    OTHER SURGICAL HISTORY  01/13/2020    Tonsillectomy with adenoidectomy    OTHER SURGICAL HISTORY  01/13/2020    Wrist surgery    OTHER SURGICAL HISTORY  01/13/2020    Esophagogastroduodenoscopy    OTHER SURGICAL HISTORY  01/13/2020    Colonoscopy      Family History   Problem Relation Name Age of Onset    Other (cardiac disorder) Mother      Stroke Mother      Other (cardiac disorder) Father      Stroke Maternal Grandmother        Social History     Social History Narrative    Not on file      Allergies   Allergen Reactions    Penicillins Other and Unknown     immune to it    Sulfa (Sulfonamide Antibiotics) Hives and Unknown        PMH: Reviewed  PSH: Reviewed  Social History: Reviewed.   Allergies reviewed.     HPI: Garcia Haskins is a 74 y.o. male who presents to the ED today accompanied by his daughter with complaints of abnormal labs, generalized weakness.  Labs were drawn yesterday as an outpatient, was called today and told to come to the ED for abnormal labs.  States he feels his typical self, may be more tired than usual.  Daughter states it has been a gradual decline in the past month.  He had an appointment about 2 weeks ago  with some abnormal labs then, they were repeated just yesterday.  No fevers or chills.  No chest pain or shortness of breath.      REVIEW OF SYSTEMS:  All other systems reviewed and negative except as listed in HPI.    PHYSICAL EXAM:    GENERAL: Vitals noted, no distress. Alert and oriented x 3. Non-toxic.     EENT: TMs clear. Posterior oropharynx unremarkable. EOMI, no nystagmus noted.     NECK: Supple. No masses. No midline tenderness. No meningeal signs.     CARDIAC: Regular rate, rhythm. No murmurs rubs or gallops. No JVD.    PULMONARY: Lungs clear and equal bilaterally. No wheezes rales or rhonchi. No respiratory distress.     ABDOMEN: Soft, nondistended, and nontender. No peritoneal signs. Bowel sounds are present and normoactive in all 4 quadrants. No pulsatile masses.     EXTREMITIES: No peripheral edema.     SKIN: No rash. Warm, dry, and intact.     NEURO: No focal neurologic deficits.     Labs Reviewed   CBC WITH AUTO DIFFERENTIAL - Abnormal       Result Value    WBC 19.6 (*)     nRBC 0.0      RBC 3.25 (*)     Hemoglobin 8.1 (*)     Hematocrit 24.6 (*)     MCV 76 (*)     MCH 24.9 (*)     MCHC 32.9      RDW 16.9 (*)     Platelets 254      Neutrophils % 89.8      Immature Granulocytes %, Automated 0.5      Lymphocytes % 3.2      Monocytes % 5.5      Eosinophils % 0.5      Basophils % 0.5      Neutrophils Absolute 17.62 (*)     Immature Granulocytes Absolute, Automated 0.10      Lymphocytes Absolute 0.62 (*)     Monocytes Absolute 1.08 (*)     Eosinophils Absolute 0.10      Basophils Absolute 0.10     COMPREHENSIVE METABOLIC PANEL - Abnormal    Glucose 132 (*)     Sodium 134 (*)     Potassium 3.0 (*)     Chloride 100      Bicarbonate 24      Anion Gap 13      Urea Nitrogen 24 (*)     Creatinine 2.72 (*)     eGFR 24 (*)     Calcium 5.0 (*)     Albumin 3.3 (*)     Alkaline Phosphatase 323 (*)     Total Protein 6.6      AST 42 (*)     Bilirubin, Total 0.4      ALT 31     MAGNESIUM - Abnormal    Magnesium 1.46  (*)    CALCIUM, IONIZED - Abnormal    POCT Calcium, Ionized 0.58 (*)    LACTATE - Normal    Lactate 0.7      Narrative:     Venipuncture immediately after or during the administration of Metamizole may lead to falsely low results. Testing should be performed immediately prior to Metamizole dosing.   BLOOD CULTURE   BLOOD CULTURE   URINALYSIS WITH REFLEX CULTURE AND MICROSCOPIC    Narrative:     The following orders were created for panel order Urinalysis with Reflex Culture and Microscopic.  Procedure                               Abnormality         Status                     ---------                               -----------         ------                     Urinalysis with Reflex C...[[id number]]                                                 Extra Urine Gray Tube[[id number]]                                                         Please view results for these tests on the individual orders.   URINALYSIS WITH REFLEX CULTURE AND MICROSCOPIC   EXTRA URINE GRAY TUBE        XR chest 1 view   Final Result        New sizable right basilar airspace opacity with the adjacent   effusion. This could be pneumonia or possibly some asymmetric edema.        Prominent perihilar and basilar interstitial markings suggestive   least some component of interstitial edema.        Suggested extensive sclerosis of the right scapula involving the   glenoid and acromion. This is of uncertain etiology could be osseous   lesion potentially Paget's disease. Consider dedicated evaluation of   the right shoulder.        MACRO:   Critical Finding:  See findings. Notification was initiated on   12/13/2024 at 12:32 pm by  Vasyl Rachel.  (**-YCF-**)        Signed by: Vasyl Rachel 12/13/2024 12:32 PM   Dictation workstation:   [id number]           Medical Decision Making  Amount and/or Complexity of Data Reviewed  Labs: ordered.  Radiology: ordered.  ECG/medicine tests: ordered.    EKG interpreted by myself shows SR with rate of 86. Normal axis.  NM interval 126. QRS interval 100. QT interval 500, prolonged. QTc interval 598. Non-specific ST-T wave changes. No acute ischemia or injury pattern.       ED COURSE: This patient was seen and examined by myself and Dr. Haskins. He is placed on a continuous cardiac monitor with pulse oximetry monitoring. Old records and EKGs are obtained and reviewed. IV heplock is established, labs are obtained and noted above.  Multiple abnormal labs as noted above, including potassium 3.0, magnesium 1.46, calcium 5.0, ionized calcium 0.58, elevated LFTs, white blood count elevated at 19.6 up from 13 yesterday. Chest x-ray shows new sizable right basilar airspace opacity with the adjacent effusion could be pneumonia or possibly asymmetric edema.  Perihilar and basilar interstitial markings suggestive of an interstitial edema, extensive sclerosis of the right scapula involving the glenoid and acromion of uncertain etiology recommending a dedicated evaluation of the right shoulder.  Pneumonia treated with IV Rocephin and Zithromax after cultures are obtained.  Lactate within normal limits.  Potassium repleted with 40 meq orally and 20 meq IV.  Magnesium repleted with 2 g IV.  Holding off on calcium repletion at this time as the patient has multiple lines and meds running prior to admission.  X-ray of the right shoulder added.  Patient feeling made aware of the abnormal findings on the cxr.  Discussed with hospitalist, Dr. Fung, accepted for admission to the medical telemetry floor.          Differential Diagnoses Considered: electrolyte abnormality, dehydration, infectious etiology    Chronic Medical Conditions Significantly Affecting Care: see above    External Records Reviewed: I reviewed recent and relevant outside records including: PCP notes, prior discharge summary, previous radiologic studies    Diagnostic testing considered: Blood, urine, chest x-ray, EKG    Escalation of Care: Appropriate for inpatient management, considered  observation/admission    Discussion of Management with Other Providers: I discussed the patient/results with: admitting team        DIAGNOSTIC IMPRESSION: #1 weakness #2 community-acquired pneumonia, right lower lobe #3 hypomagnesia #4 hypokalemia #5 hypocalcemia #6 abnormal x-ray finding     Renee Monge, APRN-CNP  12/13/24 [id number]

## 2024-12-14 LAB
ALBUMIN SERPL BCP-MCNC: 3 G/DL (ref 3.4–5)
ALP SERPL-CCNC: 288 U/L (ref 33–136)
ALT SERPL W P-5'-P-CCNC: 27 U/L (ref 10–52)
ANION GAP SERPL CALC-SCNC: 16 MMOL/L (ref 10–20)
AST SERPL W P-5'-P-CCNC: 29 U/L (ref 9–39)
ATRIAL RATE: 86 BPM
BILIRUB SERPL-MCNC: 0.3 MG/DL (ref 0–1.2)
BUN SERPL-MCNC: 25 MG/DL (ref 6–23)
CA-I BLD-SCNC: 0.66 MMOL/L (ref 1.1–1.33)
CALCIUM SERPL-MCNC: 5.3 MG/DL (ref 8.6–10.3)
CHLORIDE SERPL-SCNC: 101 MMOL/L (ref 98–107)
CO2 SERPL-SCNC: 18 MMOL/L (ref 21–32)
CREAT SERPL-MCNC: 2.78 MG/DL (ref 0.5–1.3)
EGFRCR SERPLBLD CKD-EPI 2021: 23 ML/MIN/1.73M*2
ERYTHROCYTE [DISTWIDTH] IN BLOOD BY AUTOMATED COUNT: 17 % (ref 11.5–14.5)
FERRITIN SERPL-MCNC: 268 NG/ML (ref 20–300)
GLUCOSE SERPL-MCNC: 113 MG/DL (ref 74–99)
HCT VFR BLD AUTO: 23.2 % (ref 41–52)
HGB BLD-MCNC: 7.6 G/DL (ref 13.5–17.5)
HOLD SPECIMEN: NORMAL
IRON SATN MFR SERPL: 17 % (ref 25–45)
IRON SERPL-MCNC: 35 UG/DL (ref 35–150)
MAGNESIUM SERPL-MCNC: 1.83 MG/DL (ref 1.6–2.4)
MCH RBC QN AUTO: 25.5 PG (ref 26–34)
MCHC RBC AUTO-ENTMCNC: 32.8 G/DL (ref 32–36)
MCV RBC AUTO: 78 FL (ref 80–100)
NRBC BLD-RTO: 0 /100 WBCS (ref 0–0)
P AXIS: 58 DEGREES
P OFFSET: 196 MS
P ONSET: 148 MS
PHOSPHATE SERPL-MCNC: 4.6 MG/DL (ref 2.5–4.9)
PLATELET # BLD AUTO: 287 X10*3/UL (ref 150–450)
POTASSIUM SERPL-SCNC: 3.2 MMOL/L (ref 3.5–5.3)
PR INTERVAL: 126 MS
PROCALCITONIN SERPL-MCNC: 0.89 NG/ML
PROT SERPL-MCNC: 6.2 G/DL (ref 6.4–8.2)
PTH-INTACT SERPL-MCNC: 8.6 PG/ML (ref 18.5–88)
Q ONSET: 211 MS
QRS COUNT: 14 BEATS
QRS DURATION: 100 MS
QT INTERVAL: 500 MS
QTC CALCULATION(BAZETT): 598 MS
QTC FREDERICIA: 564 MS
R AXIS: 73 DEGREES
RBC # BLD AUTO: 2.98 X10*6/UL (ref 4.5–5.9)
SODIUM SERPL-SCNC: 132 MMOL/L (ref 136–145)
T AXIS: 47 DEGREES
T OFFSET: 461 MS
TIBC SERPL-MCNC: 211 UG/DL (ref 240–445)
TSH SERPL-ACNC: 2.33 MIU/L (ref 0.44–3.98)
UIBC SERPL-MCNC: 176 UG/DL (ref 110–370)
VENTRICULAR RATE: 86 BPM
WBC # BLD AUTO: 12.5 X10*3/UL (ref 4.4–11.3)

## 2024-12-14 PROCEDURE — 2500000002 HC RX 250 W HCPCS SELF ADMINISTERED DRUGS (ALT 637 FOR MEDICARE OP, ALT 636 FOR OP/ED): Performed by: STUDENT IN AN ORGANIZED HEALTH CARE EDUCATION/TRAINING PROGRAM

## 2024-12-14 PROCEDURE — 2500000001 HC RX 250 WO HCPCS SELF ADMINISTERED DRUGS (ALT 637 FOR MEDICARE OP): Performed by: INTERNAL MEDICINE

## 2024-12-14 PROCEDURE — 85027 COMPLETE CBC AUTOMATED: CPT | Performed by: HOSPITALIST

## 2024-12-14 PROCEDURE — 94640 AIRWAY INHALATION TREATMENT: CPT

## 2024-12-14 PROCEDURE — 2500000001 HC RX 250 WO HCPCS SELF ADMINISTERED DRUGS (ALT 637 FOR MEDICARE OP)

## 2024-12-14 PROCEDURE — 2500000002 HC RX 250 W HCPCS SELF ADMINISTERED DRUGS (ALT 637 FOR MEDICARE OP, ALT 636 FOR OP/ED): Performed by: HOSPITALIST

## 2024-12-14 PROCEDURE — 80053 COMPREHEN METABOLIC PANEL: CPT | Performed by: HOSPITALIST

## 2024-12-14 PROCEDURE — 83735 ASSAY OF MAGNESIUM: CPT | Performed by: INTERNAL MEDICINE

## 2024-12-14 PROCEDURE — 82728 ASSAY OF FERRITIN: CPT | Performed by: INTERNAL MEDICINE

## 2024-12-14 PROCEDURE — 36415 COLL VENOUS BLD VENIPUNCTURE: CPT | Performed by: HOSPITALIST

## 2024-12-14 PROCEDURE — 2500000004 HC RX 250 GENERAL PHARMACY W/ HCPCS (ALT 636 FOR OP/ED): Performed by: HOSPITALIST

## 2024-12-14 PROCEDURE — 99233 SBSQ HOSP IP/OBS HIGH 50: CPT | Performed by: HOSPITALIST

## 2024-12-14 PROCEDURE — 84443 ASSAY THYROID STIM HORMONE: CPT | Performed by: INTERNAL MEDICINE

## 2024-12-14 PROCEDURE — 2500000005 HC RX 250 GENERAL PHARMACY W/O HCPCS: Performed by: EMERGENCY MEDICINE

## 2024-12-14 PROCEDURE — 99222 1ST HOSP IP/OBS MODERATE 55: CPT | Performed by: INTERNAL MEDICINE

## 2024-12-14 PROCEDURE — 2500000004 HC RX 250 GENERAL PHARMACY W/ HCPCS (ALT 636 FOR OP/ED): Performed by: STUDENT IN AN ORGANIZED HEALTH CARE EDUCATION/TRAINING PROGRAM

## 2024-12-14 PROCEDURE — 1200000002 HC GENERAL ROOM WITH TELEMETRY DAILY

## 2024-12-14 PROCEDURE — 83540 ASSAY OF IRON: CPT | Performed by: INTERNAL MEDICINE

## 2024-12-14 PROCEDURE — 82330 ASSAY OF CALCIUM: CPT | Performed by: HOSPITALIST

## 2024-12-14 PROCEDURE — 2500000002 HC RX 250 W HCPCS SELF ADMINISTERED DRUGS (ALT 637 FOR MEDICARE OP, ALT 636 FOR OP/ED): Performed by: INTERNAL MEDICINE

## 2024-12-14 PROCEDURE — 97161 PT EVAL LOW COMPLEX 20 MIN: CPT | Mod: GP

## 2024-12-14 PROCEDURE — 2500000001 HC RX 250 WO HCPCS SELF ADMINISTERED DRUGS (ALT 637 FOR MEDICARE OP): Performed by: HOSPITALIST

## 2024-12-14 PROCEDURE — 84100 ASSAY OF PHOSPHORUS: CPT | Performed by: INTERNAL MEDICINE

## 2024-12-14 RX ORDER — IPRATROPIUM BROMIDE AND ALBUTEROL SULFATE 2.5; .5 MG/3ML; MG/3ML
3 SOLUTION RESPIRATORY (INHALATION) 4 TIMES DAILY PRN
Status: DISCONTINUED | OUTPATIENT
Start: 2024-12-14 | End: 2024-12-14

## 2024-12-14 RX ORDER — IPRATROPIUM BROMIDE AND ALBUTEROL SULFATE 2.5; .5 MG/3ML; MG/3ML
3 SOLUTION RESPIRATORY (INHALATION) EVERY 2 HOUR PRN
Status: DISCONTINUED | OUTPATIENT
Start: 2024-12-14 | End: 2024-12-20 | Stop reason: HOSPADM

## 2024-12-14 RX ORDER — CALCIUM CARBONATE 200(500)MG
1000 TABLET,CHEWABLE ORAL 3 TIMES DAILY
Status: DISCONTINUED | OUTPATIENT
Start: 2024-12-14 | End: 2024-12-20 | Stop reason: HOSPADM

## 2024-12-14 RX ORDER — CALCIUM GLUCONATE 20 MG/ML
2 INJECTION, SOLUTION INTRAVENOUS ONCE
Status: COMPLETED | OUTPATIENT
Start: 2024-12-14 | End: 2024-12-14

## 2024-12-14 RX ORDER — IPRATROPIUM BROMIDE AND ALBUTEROL SULFATE 2.5; .5 MG/3ML; MG/3ML
3 SOLUTION RESPIRATORY (INHALATION)
Status: DISCONTINUED | OUTPATIENT
Start: 2024-12-14 | End: 2024-12-20 | Stop reason: HOSPADM

## 2024-12-14 RX ORDER — POTASSIUM CHLORIDE 20 MEQ/1
40 TABLET, EXTENDED RELEASE ORAL ONCE
Status: COMPLETED | OUTPATIENT
Start: 2024-12-14 | End: 2024-12-14

## 2024-12-14 RX ORDER — LANOLIN ALCOHOL/MO/W.PET/CERES
400 CREAM (GRAM) TOPICAL DAILY
Status: DISCONTINUED | OUTPATIENT
Start: 2024-12-14 | End: 2024-12-20 | Stop reason: HOSPADM

## 2024-12-14 RX ORDER — MORPHINE SULFATE 2 MG/ML
2 INJECTION, SOLUTION INTRAMUSCULAR; INTRAVENOUS ONCE
Status: COMPLETED | OUTPATIENT
Start: 2024-12-14 | End: 2024-12-14

## 2024-12-14 RX ORDER — SODIUM BICARBONATE 325 MG/1
1300 TABLET ORAL 2 TIMES DAILY
Status: DISCONTINUED | OUTPATIENT
Start: 2024-12-14 | End: 2024-12-19

## 2024-12-14 RX ORDER — CALCIUM GLUCONATE 20 MG/ML
2 INJECTION, SOLUTION INTRAVENOUS ONCE
Status: DISCONTINUED | OUTPATIENT
Start: 2024-12-14 | End: 2024-12-14

## 2024-12-14 ASSESSMENT — COGNITIVE AND FUNCTIONAL STATUS - GENERAL
CLIMB 3 TO 5 STEPS WITH RAILING: A LOT
MOBILITY SCORE: 21
WALKING IN HOSPITAL ROOM: A LITTLE

## 2024-12-14 ASSESSMENT — PAIN SCALES - GENERAL
PAINLEVEL_OUTOF10: 0 - NO PAIN

## 2024-12-14 ASSESSMENT — ACTIVITIES OF DAILY LIVING (ADL): LACK_OF_TRANSPORTATION: NO

## 2024-12-14 ASSESSMENT — PAIN - FUNCTIONAL ASSESSMENT
PAIN_FUNCTIONAL_ASSESSMENT: 0-10

## 2024-12-14 NOTE — CARE PLAN
The patient's goals for the shift include      The clinical goals for the shift include normal calcium levels      Problem: Nutrition  Goal: Oral intake greater 75%  Outcome: Progressing     Problem: Nutrition  Goal: Consume prescribed supplement  Outcome: Progressing     Problem: Nutrition  Goal: Adequate PO fluid intake  Outcome: Progressing

## 2024-12-14 NOTE — PROGRESS NOTES
"Garcia Haskins is a 74 y.o. male on day 1 of admission presenting with Hypokalemia.    Subjective   Shortness of breath  Generalized weakness  Electrolyte deficits and acidotic  Poor nutrition intake   Denies productive cough or fever    Objective     Physical Exam  General Appearance: AAO x 3, fatigue tired slightly cachectic looking  Skin: skin color pink, warm, and dry; no suspicious rashes or lesions  Eyes : PERRL, EOM's intact  ENT: mucous membranes pink and moist  Neck: normocephalic  Respiratory: lungs clear to auscultation anteriorly; no wheezing, rhonchi, or crackles.   Heart: regular rate and rhythm.   Abdomen: Nondistended, positive bowel sounds x4, soft,  nontender  Extremities: no edema   Peripheral pulses: normal x4 extremities  Neuro: alert, coherent and conversant, no focal motor deficits  Last Recorded Vitals  Blood pressure 177/78, pulse 100, temperature 36.4 °C (97.5 °F), temperature source Temporal, resp. rate 18, height 1.753 m (5' 9.02\"), weight 76.2 kg (167 lb 15.9 oz), SpO2 92%.  Intake/Output last 3 Shifts:  I/O last 3 completed shifts:  In: 2453.3 (32.2 mL/kg) [P.O.:815; I.V.:1138.3 (14.9 mL/kg); IV Piggyback:500]  Out: 175 (2.3 mL/kg) [Urine:175 (0.1 mL/kg/hr)]  Weight: 76.2 kg     Relevant Results    Scheduled medications  azithromycin, 500 mg, oral, q24h RYLIE  calcium carbonate, 1,000 mg, oral, TID  calcium gluconate, 2 g, intravenous, Once  cefTRIAXone, 2 g, intravenous, q24h  clopidogrel, 75 mg, oral, Daily  empagliflozin, 10 mg, oral, Daily  enoxaparin, 30 mg, subcutaneous, q24h  ferrous sulfate (325 mg ferrous sulfate), 65 mg of iron, oral, Daily  hydrALAZINE, 50 mg, oral, TID  ipratropium-albuteroL, 3 mL, nebulization, q6h  levothyroxine, 175 mcg, oral, Nightly  magnesium oxide, 400 mg, oral, Daily  NIFEdipine ER, 90 mg, oral, Daily  oral hydration, 25 mL, oral, q1h while awake  polyethylene glycol, 17 g, oral, Daily  potassium chloride CR, 40 mEq, oral, Once  potassium chloride " CR, 10 mEq, oral, BID  sodium bicarbonate, 1,300 mg, oral, BID      Continuous medications     PRN medications  PRN medications: acetaminophen **OR** acetaminophen **OR** acetaminophen, acetaminophen **OR** acetaminophen **OR** acetaminophen, guaiFENesin, ipratropium-albuteroL, ondansetron ODT **OR** ondansetron, oxygen    Results for orders placed or performed during the hospital encounter of 12/13/24 (from the past 24 hours)   Procalcitonin   Result Value Ref Range    Procalcitonin 0.89 (H) <=0.07 ng/mL   Urinalysis with Reflex Culture and Microscopic   Result Value Ref Range    Color, Urine Light-Yellow Light-Yellow, Yellow, Dark-Yellow    Appearance, Urine Clear Clear    Specific Gravity, Urine 1.020 1.005 - 1.035    pH, Urine 6.5 5.0, 5.5, 6.0, 6.5, 7.0, 7.5, 8.0    Protein, Urine 300 (3+) (A) NEGATIVE, 10 (TRACE), 20 (TRACE) mg/dL    Glucose, Urine 50 (TRACE) (A) Normal mg/dL    Blood, Urine NEGATIVE NEGATIVE    Ketones, Urine NEGATIVE NEGATIVE mg/dL    Bilirubin, Urine NEGATIVE NEGATIVE    Urobilinogen, Urine Normal Normal mg/dL    Nitrite, Urine NEGATIVE NEGATIVE    Leukocyte Esterase, Urine NEGATIVE NEGATIVE   Extra Urine Gray Tube   Result Value Ref Range    Extra Tube Hold for add-ons.    Urinalysis Microscopic   Result Value Ref Range    WBC, Urine 1-5 1-5, NONE /HPF    RBC, Urine 1-2 NONE, 1-2, 3-5 /HPF    Bacteria, Urine 1+ (A) NONE SEEN /HPF    Mucus, Urine FEW Reference range not established. /LPF   Basic metabolic panel   Result Value Ref Range    Glucose 153 (H) 74 - 99 mg/dL    Sodium 131 (L) 136 - 145 mmol/L    Potassium 3.7 3.5 - 5.3 mmol/L    Chloride 98 98 - 107 mmol/L    Bicarbonate 21 21 - 32 mmol/L    Anion Gap 16 mmol/L    Urea Nitrogen 25 (H) 6 - 23 mg/dL    Creatinine 2.94 (H) 0.50 - 1.30 mg/dL    eGFR 22 (L) >60 mL/min/1.73m*2    Calcium 5.2 (LL) 8.6 - 10.3 mg/dL   PTH, Intact   Result Value Ref Range    Parathyroid Hormone, Intact 8.6 (L) 18.5 - 88.0 pg/mL   Calcium, Ionized   Result  Value Ref Range    POCT Calcium, Ionized 0.61 (LL) 1.1 - 1.33 mmol/L   CBC   Result Value Ref Range    WBC 12.5 (H) 4.4 - 11.3 x10*3/uL    nRBC 0.0 0.0 - 0.0 /100 WBCs    RBC 2.98 (L) 4.50 - 5.90 x10*6/uL    Hemoglobin 7.6 (L) 13.5 - 17.5 g/dL    Hematocrit 23.2 (L) 41.0 - 52.0 %    MCV 78 (L) 80 - 100 fL    MCH 25.5 (L) 26.0 - 34.0 pg    MCHC 32.8 32.0 - 36.0 g/dL    RDW 17.0 (H) 11.5 - 14.5 %    Platelets 287 150 - 450 x10*3/uL   Comprehensive metabolic panel   Result Value Ref Range    Glucose 113 (H) 74 - 99 mg/dL    Sodium 132 (L) 136 - 145 mmol/L    Potassium 3.2 (L) 3.5 - 5.3 mmol/L    Chloride 101 98 - 107 mmol/L    Bicarbonate 18 (L) 21 - 32 mmol/L    Anion Gap 16 10 - 20 mmol/L    Urea Nitrogen 25 (H) 6 - 23 mg/dL    Creatinine 2.78 (H) 0.50 - 1.30 mg/dL    eGFR 23 (L) >60 mL/min/1.73m*2    Calcium 5.3 (LL) 8.6 - 10.3 mg/dL    Albumin 3.0 (L) 3.4 - 5.0 g/dL    Alkaline Phosphatase 288 (H) 33 - 136 U/L    Total Protein 6.2 (L) 6.4 - 8.2 g/dL    AST 29 9 - 39 U/L    Bilirubin, Total 0.3 0.0 - 1.2 mg/dL    ALT 27 10 - 52 U/L   Calcium, ionized   Result Value Ref Range    POCT Calcium, Ionized 0.66 (LL) 1.1 - 1.33 mmol/L   Ferritin   Result Value Ref Range    Ferritin 268 20 - 300 ng/mL   Iron and TIBC   Result Value Ref Range    Iron 35 35 - 150 ug/dL    UIBC 176 110 - 370 ug/dL    TIBC 211 (L) 240 - 445 ug/dL    % Saturation 17 (L) 25 - 45 %   Magnesium   Result Value Ref Range    Magnesium 1.83 1.60 - 2.40 mg/dL   Phosphorus   Result Value Ref Range    Phosphorus 4.6 2.5 - 4.9 mg/dL   TSH   Result Value Ref Range    Thyroid Stimulating Hormone 2.33 0.44 - 3.98 mIU/L              ECG 12 lead    Result Date: 12/14/2024  Normal sinus rhythm Nonspecific T wave abnormality Prolonged QT Abnormal ECG When compared with ECG of 13-DEC-2024 12:17, (unconfirmed) ST no longer depressed in Lateral leads Nonspecific T wave abnormality, improved in Lateral leads QT has lengthened See ED provider note for full  interpretation and clinical correlation Confirmed by Martina Petersen ([id number]) on 12/14/2024 2:22:17 PM    XR shoulder right 2+ views    Result Date: 12/13/2024  Interpreted By:  Vasyl Rachel, STUDY: XR SHOULDER RIGHT 2+ VIEWS   INDICATION: Signs/Symptoms:abn CXR.   COMPARISON: Chest radiographs earlier the same day   ACCESSION NUMBER(S): [id number]   ORDERING CLINICIAN: HARITHA SOUZA   FINDINGS: There is some glenoid rim sclerosis and cystic change with some possible cortical thickening and coarsening of the trabecula. Findings raise concern for possible Paget's disease.   No evidence of acute fracture or dislocation.         There is some glenoid rim sclerosis and cystic change with some possible cortical thickening and coarsening of the trabecula. Findings raise concern for possible Paget's disease.   No evidence of acute fracture or dislocation.   Signed by: Vasyl Rachel 12/13/2024 3:30 PM Dictation workstation:   [id number]    XR chest 1 view    Result Date: 12/13/2024  Interpreted By:  Vasyl Rachel, STUDY: XR CHEST 1 VIEW   INDICATION: Signs/Symptoms:weakness.   COMPARISON: May 9, 2016   ACCESSION NUMBER(S): [id number]   ORDERING CLINICIAN: HARITHA SOUZA   FINDINGS: New sizable right basilar airspace opacity with the adjacent effusion. This could be pneumonia or possibly some asymmetric edema.   Prominent perihilar and basilar interstitial markings suggestive least some component of interstitial edema.   Suggested extensive sclerosis of the right scapula involving the glenoid and acromion. This is of uncertain etiology could be osseous lesion potentially Paget's disease. Consider dedicated evaluation of the right shoulder.         New sizable right basilar airspace opacity with the adjacent effusion. This could be pneumonia or possibly some asymmetric edema.   Prominent perihilar and basilar interstitial markings suggestive least some component of interstitial edema.   Suggested extensive  sclerosis of the right scapula involving the glenoid and acromion. This is of uncertain etiology could be osseous lesion potentially Paget's disease. Consider dedicated evaluation of the right shoulder.   MACRO: Critical Finding:  See findings. Notification was initiated on 12/13/2024 at 12:32 pm by  Vasyl Rachel.  (**-YCF-**)   Signed by: Vasyl Rachel 12/13/2024 12:32 PM Dictation workstation:   [id number]                  Assessment/Plan   Assessment & Plan  Hypokalemia      74-year-old male with history of  Chronic kidney disease stage III  Type 2 diabetes mellitus  CVA  Hypertension  Hyperlipidemia   hypothyroidism  Neuropathy  Presenting with  Hypokalemia  Hypomagnesemia and hypocalcemia GLENIS  Right lower lobe pneumonia, elevated procalcitonin level  Anemia  Questionable Paget's disease by imaging concerns    Plan  Continue cardiopulmonary monitoring  Watch QT interval  Replace potassium, magnesium, calcium  Also continue maintenance therapy  Have to keep mag levels up in normal range for PTH calcium regulation to function and optimize bone mineral metabolism  Follow vitamin D level   Low PTH  TSH okay on Synthroid replacement  Metabolic acidosis on bicarb tablets  Stopped fluids with volume overload  Watch kidney function, avoid nephrotoxic medicines and hypotension  Follow nephrology  On ceftriaxone and azithromycin for pneumonia  Follow cultures  Lovenox for DVT prophylaxis  Continue home medicines  Jardiance, Plavix, nifedipine, hydralazine  Follow vitals  Iron supplement for anemia  Symptomatic management  Synthroid for hypothyroidism  To follow rheumatology and orthopedic outpatient  Tylenol for mild pain  PT OT  Nutrition  Restore normal homeostasis and electrolyte balance as well as acid-base disorder of normal anion gap metabolic acidosis                   Shayan Fung MD

## 2024-12-14 NOTE — PROGRESS NOTES
"   12/14/24 1345   Discharge Planning   Living Arrangements Alone   Support Systems Children   Assistance Needed independnet in bathing, dressing, cooking, cleaning. cooking, cleaning. Has had 2 falls in past 3 months, \"Once i was with my son and the other time I didn't turn on the light to go to the bathroom.\" Has cane.   Type of Residence Private residence   Number of Stairs to Enter Residence 0   Number of Stairs Within Residence 0   Who is requesting discharge planning? Provider   Home or Post Acute Services In home services   Type of Home Care Services Meals on Wheels;Home PT;Home OT;Home nursing visits;Home health aide   Expected Discharge Disposition Home H   Does the patient need discharge transport arranged? No   Financial Resource Strain   How hard is it for you to pay for the very basics like food, housing, medical care, and heating? Not hard   Housing Stability   In the last 12 months, was there a time when you were not able to pay the mortgage or rent on time? N   In the past 12 months, how many times have you moved where you were living? 0   At any time in the past 12 months, were you homeless or living in a shelter (including now)? N   Transportation Needs   In the past 12 months, has lack of transportation kept you from medical appointments or from getting medications? no   In the past 12 months, has lack of transportation kept you from meetings, work, or from getting things needed for daily living? No   Stroke Family Assessment   Stroke Family Assessment Needed No   Intensity of Service   Intensity of Service 0-30 min     Met with pt, role of TCC explained, demographics confirmed. PCP- Dr. Dias, last visit 8/20/24. Pharmacy- [facility] [address] in [place]- takes meds as prescribed able to afford and obtain. Is diabetic checking sugar when needed. [facility] 21/24- pt lives alone, denies falls has cane. Discussed discharge options. Pt would like [facility] choice list and meals on wheels resource. Pt has " [place] address, will provide [facility] agency on aging [facility] contact. CT will follow.

## 2024-12-14 NOTE — H&P
History Of Present Illness  Garcia Haskins is a 74 y.o. male presenting with generalized weakness for a week and tired all the time.  Denies sore throat runny nose sinus or nasal congestion or cough or fever or hemoptysis or shortness of breath or chest pain.  No palpitations or dizziness or worsening leg swelling.  No headache or focal weakness.  Patient had recent labs drawn and sent in by nephrology due to abnormal electrolytes including hypokalemia, hypomagnesemia, hypocalcemia as well GLENIS.  Poor appetite but no alarming weight loss but decreased p.o. intake.  Limited mobility.  Somewhat decline.  Denies having any history of malignancy.  No alarming weight loss.  Imaging showing possibility of pneumonia in right lower lobe and leukocytosis.  No productive cough or sputum production.    There is glenoid rim sclerosis and cystic change with some possible cortical thickening and coarsening of trabecula.  Concern of possible Paget's disease.  Denies any specific bone pains but overall aches.  Alk phosphatase elevated.  No acute fracture or dislocation.  Past Medical History  Past Medical History:   Diagnosis Date    Personal history of colonic polyps     History of colonic polyps    Personal history of other diseases of the digestive system     History of upper gastrointestinal hemorrhage    Personal history of other diseases of the nervous system and sense organs     History of peripheral neuropathy    Personal history of transient ischemic attack (TIA), and cerebral infarction without residual deficits 01/27/2022    History of cerebrovascular accident   Chronic kidney disease stage III  Type 2 diabetes mellitus  CVA  Hypertension  Hyperlipidemia  Hypothyroidism    Surgical History  Past Surgical History:   Procedure Laterality Date    OTHER SURGICAL HISTORY  01/13/2020    Tonsillectomy with adenoidectomy    OTHER SURGICAL HISTORY  01/13/2020    Wrist surgery    OTHER SURGICAL HISTORY  01/13/2020     "Esophagogastroduodenoscopy    OTHER SURGICAL HISTORY  01/13/2020    Colonoscopy        Social History  He reports that he has quit smoking. His smoking use included cigarettes. He has never used smokeless tobacco. He reports that he does not currently use alcohol. He reports that he does not use drugs.    Family History  Family History   Problem Relation Name Age of Onset    Other (cardiac disorder) Mother      Stroke Mother      Other (cardiac disorder) Father      Stroke Maternal Grandmother          Allergies  Penicillins and Sulfa (sulfonamide antibiotics)    Review of Systems  All other 12 point review of systems negative except HPI  Physical Exam   General Appearance: AAO x 3, fatigue tired slightly cachectic looking  Skin: skin color pink, warm, and dry; no suspicious rashes or lesions  Eyes : PERRL, EOM's intact  ENT: mucous membranes pink and moist  Neck: normocephalic  Respiratory: lungs clear to auscultation anteriorly; no wheezing, rhonchi, or crackles.   Heart: regular rate and rhythm.   Abdomen: Nondistended, positive bowel sounds x4, soft,  nontender  Extremities: no edema   Peripheral pulses: normal x4 extremities  Neuro: alert, coherent and conversant, no focal motor deficits  Last Recorded Vitals  Blood pressure 161/73, pulse 81, temperature 36.7 °C (98.1 °F), resp. rate 18, height 1.753 m (5' 9.02\"), weight 76.2 kg (167 lb 15.9 oz), SpO2 94%.    Relevant Results  Scheduled medications  [START ON 12/14/2024] azithromycin, 500 mg, oral, q24h RYLIE  [START ON 12/14/2024] cefTRIAXone, 2 g, intravenous, q24h  [START ON 12/14/2024] clopidogrel, 75 mg, oral, Daily  empagliflozin, 10 mg, oral, Daily  enoxaparin, 30 mg, subcutaneous, q24h  [START ON 12/14/2024] ferrous sulfate (325 mg ferrous sulfate), 65 mg of iron, oral, Daily  hydrALAZINE, 50 mg, oral, TID  ipratropium-albuteroL, 3 mL, nebulization, q6h  levothyroxine, 175 mcg, oral, Nightly  [START ON 12/14/2024] NIFEdipine ER, 90 mg, oral, Daily  oral " hydration, 25 mL, oral, q1h while awake  polyethylene glycol, 17 g, oral, Daily  potassium chloride CR, 10 mEq, oral, BID      Continuous medications  sodium chloride 0.9%, 100 mL/hr, Last Rate: 100 mL/hr (12/13/24 1636)      PRN medications  PRN medications: acetaminophen **OR** acetaminophen **OR** acetaminophen, acetaminophen **OR** acetaminophen **OR** acetaminophen, guaiFENesin, ondansetron ODT **OR** ondansetron, oxygen    Results for orders placed or performed during the hospital encounter of 12/13/24 (from the past 24 hours)   CBC and Auto Differential   Result Value Ref Range    WBC 19.6 (H) 4.4 - 11.3 x10*3/uL    nRBC 0.0 0.0 - 0.0 /100 WBCs    RBC 3.25 (L) 4.50 - 5.90 x10*6/uL    Hemoglobin 8.1 (L) 13.5 - 17.5 g/dL    Hematocrit 24.6 (L) 41.0 - 52.0 %    MCV 76 (L) 80 - 100 fL    MCH 24.9 (L) 26.0 - 34.0 pg    MCHC 32.9 32.0 - 36.0 g/dL    RDW 16.9 (H) 11.5 - 14.5 %    Platelets 254 150 - 450 x10*3/uL    Neutrophils % 89.8 40.0 - 80.0 %    Immature Granulocytes %, Automated 0.5 0.0 - 0.9 %    Lymphocytes % 3.2 13.0 - 44.0 %    Monocytes % 5.5 2.0 - 10.0 %    Eosinophils % 0.5 0.0 - 6.0 %    Basophils % 0.5 0.0 - 2.0 %    Neutrophils Absolute 17.62 (H) 1.60 - 5.50 x10*3/uL    Immature Granulocytes Absolute, Automated 0.10 0.00 - 0.50 x10*3/uL    Lymphocytes Absolute 0.62 (L) 0.80 - 3.00 x10*3/uL    Monocytes Absolute 1.08 (H) 0.05 - 0.80 x10*3/uL    Eosinophils Absolute 0.10 0.00 - 0.40 x10*3/uL    Basophils Absolute 0.10 0.00 - 0.10 x10*3/uL   Comprehensive metabolic panel   Result Value Ref Range    Glucose 132 (H) 74 - 99 mg/dL    Sodium 134 (L) 136 - 145 mmol/L    Potassium 3.0 (L) 3.5 - 5.3 mmol/L    Chloride 100 98 - 107 mmol/L    Bicarbonate 24 21 - 32 mmol/L    Anion Gap 13 10 - 20 mmol/L    Urea Nitrogen 24 (H) 6 - 23 mg/dL    Creatinine 2.72 (H) 0.50 - 1.30 mg/dL    eGFR 24 (L) >60 mL/min/1.73m*2    Calcium 5.0 (LL) 8.6 - 10.3 mg/dL    Albumin 3.3 (L) 3.4 - 5.0 g/dL    Alkaline Phosphatase 323  (H) 33 - 136 U/L    Total Protein 6.6 6.4 - 8.2 g/dL    AST 42 (H) 9 - 39 U/L    Bilirubin, Total 0.4 0.0 - 1.2 mg/dL    ALT 31 10 - 52 U/L   Magnesium   Result Value Ref Range    Magnesium 1.46 (L) 1.60 - 2.40 mg/dL   ECG 12 lead   Result Value Ref Range    Ventricular Rate 86 BPM    Atrial Rate 86 BPM    WV Interval 126 ms    QRS Duration 100 ms    QT Interval 500 ms    QTC Calculation(Bazett) 598 ms    P Axis 58 degrees    R Axis 73 degrees    T Axis 47 degrees    QRS Count 14 beats    Q Onset 211 ms    P Onset 148 ms    P Offset 196 ms    T Offset 461 ms    QTC Fredericia 564 ms   Lactate   Result Value Ref Range    Lactate 0.7 0.4 - 2.0 mmol/L   Blood Culture    Specimen: Peripheral Venipuncture; Blood culture   Result Value Ref Range    Blood Culture Loaded on Instrument - Culture in progress    Blood Culture    Specimen: Peripheral Venipuncture; Blood culture   Result Value Ref Range    Blood Culture Loaded on Instrument - Culture in progress    Calcium, Ionized   Result Value Ref Range    POCT Calcium, Ionized 0.58 (LL) 1.1 - 1.33 mmol/L   Urinalysis with Reflex Culture and Microscopic   Result Value Ref Range    Color, Urine Light-Yellow Light-Yellow, Yellow, Dark-Yellow    Appearance, Urine Clear Clear    Specific Gravity, Urine 1.020 1.005 - 1.035    pH, Urine 6.5 5.0, 5.5, 6.0, 6.5, 7.0, 7.5, 8.0    Protein, Urine 300 (3+) (A) NEGATIVE, 10 (TRACE), 20 (TRACE) mg/dL    Glucose, Urine 50 (TRACE) (A) Normal mg/dL    Blood, Urine NEGATIVE NEGATIVE    Ketones, Urine NEGATIVE NEGATIVE mg/dL    Bilirubin, Urine NEGATIVE NEGATIVE    Urobilinogen, Urine Normal Normal mg/dL    Nitrite, Urine NEGATIVE NEGATIVE    Leukocyte Esterase, Urine NEGATIVE NEGATIVE   Urinalysis Microscopic   Result Value Ref Range    WBC, Urine 1-5 1-5, NONE /HPF    RBC, Urine 1-2 NONE, 1-2, 3-5 /HPF    Bacteria, Urine 1+ (A) NONE SEEN /HPF    Mucus, Urine FEW Reference range not established. /LPF     ECG 12 lead    Result Date:  12/13/2024  Normal sinus rhythm Nonspecific T wave abnormality Prolonged QT Abnormal ECG When compared with ECG of 13-DEC-2024 12:17, (unconfirmed) ST no longer depressed in Lateral leads Nonspecific T wave abnormality, improved in Lateral leads QT has lengthened    XR shoulder right 2+ views    Result Date: 12/13/2024  Interpreted By:  Vasyl Rachel, STUDY: XR SHOULDER RIGHT 2+ VIEWS   INDICATION: Signs/Symptoms:abn CXR.   COMPARISON: Chest radiographs earlier the same day   ACCESSION NUMBER(S): [id number]   ORDERING CLINICIAN: HARITHA SOUZA   FINDINGS: There is some glenoid rim sclerosis and cystic change with some possible cortical thickening and coarsening of the trabecula. Findings raise concern for possible Paget's disease.   No evidence of acute fracture or dislocation.         There is some glenoid rim sclerosis and cystic change with some possible cortical thickening and coarsening of the trabecula. Findings raise concern for possible Paget's disease.   No evidence of acute fracture or dislocation.   Signed by: Vasyl Rachel 12/13/2024 3:30 PM Dictation workstation:   [id number]    XR chest 1 view    Result Date: 12/13/2024  Interpreted By:  Vasyl Rachel, STUDY: XR CHEST 1 VIEW   INDICATION: Signs/Symptoms:weakness.   COMPARISON: May 9, 2016   ACCESSION NUMBER(S): [id number]   ORDERING CLINICIAN: HARITHA SOUZA   FINDINGS: New sizable right basilar airspace opacity with the adjacent effusion. This could be pneumonia or possibly some asymmetric edema.   Prominent perihilar and basilar interstitial markings suggestive least some component of interstitial edema.   Suggested extensive sclerosis of the right scapula involving the glenoid and acromion. This is of uncertain etiology could be osseous lesion potentially Paget's disease. Consider dedicated evaluation of the right shoulder.         New sizable right basilar airspace opacity with the adjacent effusion. This could be pneumonia or  possibly some asymmetric edema.   Prominent perihilar and basilar interstitial markings suggestive least some component of interstitial edema.   Suggested extensive sclerosis of the right scapula involving the glenoid and acromion. This is of uncertain etiology could be osseous lesion potentially Paget's disease. Consider dedicated evaluation of the right shoulder.   MACRO: Critical Finding:  See findings. Notification was initiated on 12/13/2024 at 12:32 pm by  Vasyl Rachel.  (**-YCF-**)   Signed by: Vasyl Rachel 12/13/2024 12:32 PM Dictation workstation:   [id number]            Assessment/Plan   Assessment & Plan  Hypokalemia      74-year-old male with history of        Chronic kidney disease stage III  Type 2 diabetes mellitus  CVA  Hypertension  Hyperlipidemia  Hypothyroidism  Neuropathy  Presenting with  Hypokalemia  Hypomagnesemia  Hypocalcemia  GLENIS  Questionable right lower lobe pneumonia  Anemia  Possible Paget's disease    Plan  Cardiopulmonary monitoring  Replace potassium, magnesium, calcium and repeat levels  Hydrate gently with normal saline at 100 cc/h  Follow urine output, monitor renal function  Daily CBC BMP  TSH, stool FOBT, iron panel, serum ferritin  Follow nephrology  Follow rheumatology/orthopedic  outpatient  Follow vitals  Check intact PTH, vitamin D level, repeat ionized calcium tomorrow  Nutrition  Continue home medicines  PT OT  Avoid nephrotoxic medicines and hypotension  Tylenol for mild pain  Supportive care  Ceftriaxone and azithromycin for possible pneumonia normal sinus  Follow sputum culture, procalcitonin level, blood culture  Lovenox for DVT prophylaxis  Patient on Jardiance  Plavix 75 mg daily for coronary artery disease/CVA prophylaxis  On nifedipine 90 Mg daily for hypertension and hydralazine 50 Mg p.o. 3 times daily  Iron supplement for anemia  Given mag, potassium supplement, calcium gluconate  Symptomatic management  On Synthroid for hypothyroidism  Continue home  medicines  Further management as clinical course evolves  Consider palliative care involvement at least outpatient                       Shayan Fung MD

## 2024-12-14 NOTE — PROGRESS NOTES
Physical Therapy    Physical Therapy Evaluation    Patient Name: Garcia Haskins  MRN: [id number]  Today's Date: 12/14/2024   Time Calculation  Start Time: 1350  Stop Time: 1411  Time Calculation (min): 21 min  311/311-A    Assessment/Plan   PT Assessment  PT Assessment Results: Decreased strength, Decreased endurance, Impaired balance, Decreased mobility  Rehab Prognosis: Good  Evaluation/Treatment Tolerance: Patient limited by fatigue  Medical Staff Made Aware: Yes  Strengths: Ability to acquire knowledge, Capable of completing ADLs semi/independent, Housing layout  Barriers to Participation: Comorbidities  End of Session Communication: Bedside nurse  End of Session Patient Position: Bed, 2 rail up, Alarm off, not on at start of session  Patient demonstrating B LE musculature weakness and limitations in bed mobility, transfers, ambulation, and functional mobility below his typical baseline. Patient would benefit from skilled acute PT interventions to address the above impairments and improve functional mobility toward baseline. Upon discharge, patient would benefit from mild intensity interventions. Patient demonstrating SBA for transfers with use of RW and most ambulation but does require CGA when navigating around obstacles and with IV/O2 lines. No significant changes in vital signs however at times patient increases rate of breathing but no reports of shortness of breath. Patient transferred into bed with all needs met, call light within reach, and instructed not to ambulate without assistance/fall prevention. Patient verbalized agreement and understanding. Nursing notified following evaluation.        IP OR SWING BED PT PLAN  Inpatient or Swing Bed: Inpatient  PT Plan  Treatment/Interventions: Bed mobility, Transfer training, Gait training, Balance training, Neuromuscular re-education, Strengthening, Stair training, Endurance training, Therapeutic exercise, Therapeutic activity, Home exercise program  PT Plan:  Ongoing PT  PT Frequency: 4 times per week  PT Discharge Recommendations: Low intensity level of continued care  Equipment Recommended upon Discharge: Wheeled walker  PT Recommended Transfer Status: Stand by assist    Subjective   Patient in bed and agreeable to PT evaluation. Patient states he has been feeling weaker than typical. He reports he does own a cane but mostly ambulates without assistive device. He has fallen 1-2x he reports with one being going up the stairs. He is interested in home health PT.   Current Problem:  1. Hypokalemia        2. Pneumonia of right lower lobe due to infectious organism        3. Hypomagnesemia        4. Hypocalcemia        5. GLENIS (acute kidney injury) (CMS-HCC)          Patient Active Problem List   Diagnosis    Type 2 diabetes mellitus with hyperglycemia, without long-term current use of insulin    Primary hypertension    Other hyperlipidemia    Diaphragmatic hernia    Acquired hypothyroidism    Hyperparathyroidism due to renal insufficiency (Multi)    Stage 3a chronic kidney disease (Multi)    Anemia of chronic disorder    Anemia    Benign colonic polyp    CKD (chronic kidney disease)    Controlled type 2 diabetes mellitus with hyperglycemia, without long-term current use of insulin    History of stroke without residual deficits    Hypoparathyroidism    Indigestion    Nocturia    Sleep apnea    Hypothyroidism    Hypercholesterolemia    Hyperlipidemia    Benign hypertension    HTN (hypertension)    Peripheral vascular disease (CMS-[facility])    Diabetes mellitus (Multi)    Body mass index (BMI) 24.0-24.9, adult    Acute kidney injury (CMS-HCC)    Hypokalemia       General Visit Information:  General  Reason for Referral: Impaired Mobility  Referred By: Kenton POST  Past Medical History Relevant to Rehab: Hx of GLENIS, hypokalemia, hypomagnesemia, hypocalcemia, CKDII, HTN, CVA, DM2, hyperlipidemia, hypothyroidism  Family/Caregiver Present: No  Prior to Session Communication: Bedside  nurse  Patient Position Received: Bed, 2 rail up, Alarm off, not on at start of session    Home Living:  Home Living  Type of Home: House  Lives With: Alone  Home Adaptive Equipment: Cane  Home Layout: One level  Home Access: Stairs to enter with rails  Entrance Stairs-Rails: Right  Entrance Stairs-Number of Steps: 3    Prior Level of Function:  Prior Function Per Pt/Caregiver Report  Level of [place]: Independent with ADLs and functional transfers, Independent with homemaking with ambulation    Precautions:  Precautions  Precautions Comment: Fall Risk    Vital Signs:  Vital Signs  Vitals Session: Post PT  Heart Rate: 100  SpO2: 92 %  Vital Signs Comment: 3L O2  Objective     Pain:  Pain Assessment  Pain Assessment: 0-10  0-10 (Numeric) Pain Score: 0 - No pain    Cognition:  Cognition  Orientation Level: Oriented X4    General Assessments:       Static Sitting Balance  Static Sitting-Balance Support: Feet supported  Static Sitting-Level of Assistance: Close supervision  Dynamic Sitting Balance  Dynamic Sitting-Balance Support: Left upper extremity supported, Feet supported  Dynamic Sitting-Level of Assistance: Close supervision  Dynamic Sitting-Balance: Lateral lean, Forward lean  Dynamic Sitting-Comments: Toilet hygiene from BM  Static Standing Balance  Static Standing-Balance Support: Bilateral upper extremity supported  Static Standing-Level of Assistance: Close supervision  Dynamic Standing Balance  Dynamic Standing-Balance Support: No upper extremity supported  Dynamic Standing-Level of Assistance: Close supervision  Dynamic Standing-Comments: washing hands at sink    Functional Assessments:     Bed Mobility  Bed Mobility: Yes  Bed Mobility 1  Bed Mobility 1: Supine to sitting  Level of Assistance 1: Close supervision  Bed Mobility 2  Bed Mobility  2: Sitting to supine  Level of Assistance 2: Close supervision  Transfers  Transfer: Yes  Transfer 1  Transfer From 1: Sit to  Transfer to 1: Stand  Technique  1: Sit to stand  Transfer Device 1: Walker  Transfer Level of Assistance 1: Close supervision  Trials/Comments 1: x4  Transfers 2  Transfer From 2: Stand to  Transfer to 2: Sit  Technique 2: Stand to sit  Transfer Device 2: Walker  Transfer Level of Assistance 2: Close supervision  Trials/Comments 2: x4-decreased eccentric control when lowering  Ambulation/Gait Training  Ambulation/Gait Training Performed: Yes  Ambulation/Gait Training 1  Surface 1: Level tile  Device 1: Rolling walker  Assistance 1: Close supervision, Contact guard  Comments/Distance (ft) 1: 15feet +15 feet; CGA with walking around obstacles          Extremity/Trunk Assessments:        RLE   RLE : Exceptions to WFL  Strength RLE  R Hip Flexion: 4/5  R Hip ABduction: 4/5  R Knee Flexion: 4/5  R Knee Extension: 4/5  LLE   LLE : Exceptions to WFL  Strength LLE  L Hip Flexion: 4/5  L Hip ABduction: 4/5  L Knee Flexion: 4/5  L Knee Extension: 4/5    Outcome Measures:     [facility] Basic Mobility  Turning from your back to your side while in a flat bed without using bedrails: None  Moving from lying on your back to sitting on the side of a flat bed without using bedrails: None  Moving to and from bed to chair (including a wheelchair): None  Standing up from a chair using your arms (e.g. wheelchair or bedside chair): None  To walk in hospital room: A little  Climbing 3-5 steps with railing: A lot  Basic Mobility - Total Score: 21            Goals:  Encounter Problems       Encounter Problems (Active)       PT Goals       Bed Mobility       Start:  12/14/24    Expected End:  12/28/24       Patient will transfer supine<>sitting edge of bed without use of handrail independently to improve functional mobility toward baseline upon discharge.           Transfers       Start:  12/14/24    Expected End:  12/28/24       Patient will transfer sit<>stand with modified independence with use of least restrictive assistive device to improve functional mobility toward  baseline upon discharge           Ambulation       Start:  12/14/24    Expected End:  12/28/24       Patient will ambulate 75 feet with use of least restrictive assistive device with SBA while demonstrating good safety and balance without significant changes in vital signs to improve functional mobility toward baseline upon discharge           Strength       Start:  12/14/24    Expected End:  12/28/24       Patient will demonstrate gross B LE musculature strength 5/5 MMT to improve stability with completion of transfers and ambulation toward baseline upon discharge              Pain - Adult            Education Documentation  Precautions, taught by Carolee Silva, PT at 12/14/2024  1:50 PM.  Learner: Patient  Readiness: Acceptance  Method: Explanation  Response: Verbalizes Understanding    Body Mechanics, taught by Carolee Silva PT at 12/14/2024  1:50 PM.  Learner: Patient  Readiness: Acceptance  Method: Explanation  Response: Verbalizes Understanding    Mobility Training, taught by Carolee Silva PT at 12/14/2024  1:50 PM.  Learner: Patient  Readiness: Acceptance  Method: Explanation  Response: Verbalizes Understanding    Education Comments  No comments found.

## 2024-12-14 NOTE — CARE PLAN
The patient's goals for the shift include    Problem: Nutrition  Goal: Oral intake greater than 50%  Outcome: Progressing  Goal: Oral intake greater 75%  Outcome: Progressing  Goal: Consume prescribed supplement  Outcome: Progressing  Goal: Adequate PO fluid intake  Outcome: Progressing  Goal: Nutrition support goals are met within 48 hrs  Outcome: Progressing  Goal: Nutrition support is meeting 75% of nutrient needs  Outcome: Progressing  Goal: BG  mg/dL  Outcome: Progressing  Goal: Lab values WNL  Outcome: Progressing  Goal: Electrolytes WNL  Outcome: Progressing  Goal: Promote healing  Outcome: Progressing  Goal: Maintain stable weight  Outcome: Progressing  Goal: Gradual weight gain  Outcome: Progressing     Problem: Diabetes  Goal: Achieve decreasing blood glucose levels by end of shift  Outcome: Progressing  Goal: Increase stability of blood glucose readings by end of shift  Outcome: Progressing  Goal: Decrease in ketones present in urine by end of shift  Outcome: Progressing  Goal: Maintain electrolyte levels within acceptable range throughout shift  Outcome: Progressing  Goal: Maintain glucose levels >70mg/dl to <250mg/dl throughout shift  Outcome: Progressing  Goal: No changes in neurological exam by end of shift  Outcome: Progressing  Goal: Learn about and adhere to nutrition recommendations by end of shift  Outcome: Progressing  Goal: Vital signs within normal range for age by end of shift  Outcome: Progressing  Goal: Increase self care and/or family involovement by end of shift  Outcome: Progressing  Goal: Receive DSME education by end of shift  Outcome: Progressing     Problem: Skin  Goal: Participates in plan/prevention/treatment measures  Outcome: Progressing  Goal: Prevent/manage excess moisture  Outcome: Progressing  Goal: Prevent/minimize sheer/friction injuries  Outcome: Progressing  Goal: Promote/optimize nutrition  Outcome: Progressing     Problem: Pain - Adult  Goal: Verbalizes/displays  adequate comfort level or baseline comfort level  Outcome: Progressing     Problem: Safety - Adult  Goal: Free from fall injury  Outcome: Progressing     Problem: Discharge Planning  Goal: Discharge to home or other facility with appropriate resources  Outcome: Progressing     Problem: Chronic Conditions and Co-morbidities  Goal: Patient's chronic conditions and co-morbidity symptoms are monitored and maintained or improved  Outcome: Progressing       The clinical goals for the shift include Pt will have no falls this shift.    Over the shift, the patient did not make progress toward the following goals. Barriers to progression include lab values. Recommendations to address these barriers include will replace electrolytes as needed and continue to monitor labs over night.

## 2024-12-14 NOTE — CONSULTS
Reason For Consult  Acute kidney injury with electrolyte abnormalities    History Of Present Illness  Garcia Haskins is a 74 y.o. male presenting with hypocalcemia.   He presented to the emergency room after my office called him secondary to abnormal lab values.  His calcium was found to be quite low and we asked him to come to the emergency room  He was a little bit reluctant to do so however he states he has been feeling pretty weak and this has been going on for at least a week.  He lives at home independently by himself  This a.m. he is sitting comfortably at the bedside  He denies any major complaints currently he does have some swelling in his lower extremities that he says is a little bit worse than it has been    Past Medical History  He has a past medical history of Personal history of colonic polyps, Personal history of other diseases of the digestive system, Personal history of other diseases of the nervous system and sense organs, and Personal history of transient ischemic attack (TIA), and cerebral infarction without residual deficits (01/27/2022).    Surgical History  He has a past surgical history that includes Other surgical history (01/13/2020); Other surgical history (01/13/2020); Other surgical history (01/13/2020); and Other surgical history (01/13/2020).     Social History  He reports that he has quit smoking. His smoking use included cigarettes. He has never used smokeless tobacco. He reports that he does not currently use alcohol. He reports that he does not use drugs.    Family History  Family History   Problem Relation Name Age of Onset    Other (cardiac disorder) Mother      Stroke Mother      Other (cardiac disorder) Father      Stroke Maternal Grandmother          Allergies  Penicillins and Sulfa (sulfonamide antibiotics)    Review of Systems  A full 10 point review of systems was obtained is negative except HPI as above     Physical Exam  Physical Exam  Constitutional:        "Appearance: Normal appearance.   HENT:      Head: Normocephalic and atraumatic.      Right Ear: External ear normal.      Left Ear: External ear normal.      Nose: Nose normal.      Mouth/Throat:      Mouth: Mucous membranes are moist.      Pharynx: Oropharynx is clear.   Eyes:      Extraocular Movements: Extraocular movements intact.      Conjunctiva/sclera: Conjunctivae normal.      Pupils: Pupils are equal, round, and reactive to light.   Cardiovascular:      Rate and Rhythm: Normal rate and regular rhythm.   Pulmonary:      Effort: Pulmonary effort is normal.      Breath sounds: Normal breath sounds.   Abdominal:      General: Abdomen is flat.      Palpations: Abdomen is soft.   Musculoskeletal:         General: Swelling present.      Right lower leg: Edema present.      Left lower leg: Edema present.   Skin:     General: Skin is warm and dry.   Neurological:      General: No focal deficit present.      Mental Status: He is alert and oriented to person, place, and time.   Psychiatric:         Mood and Affect: Mood normal.         Behavior: Behavior normal.                 I&O 24HR    Intake/Output Summary (Last 24 hours) at 12/14/2024 0903  Last data filed at 12/14/2024 0642  Gross per 24 hour   Intake 2453.33 ml   Output 175 ml   Net 2278.33 ml       Vitals 24HR  Heart Rate:  [80-96]   Temp:  [36.4 °C (97.5 °F)-37.1 °C (98.7 °F)]   Resp:  [16-18]   BP: (123-179)/(64-93)   Height:  [175.3 cm (5' 9\")-175.3 cm (5' 9.02\")]   Weight:  [72.6 kg (160 lb)-76.2 kg (167 lb 15.9 oz)]   SpO2:  [90 %-98 %]     Scheduled medications  azithromycin, 500 mg, oral, q24h RYLIE  cefTRIAXone, 2 g, intravenous, q24h  clopidogrel, 75 mg, oral, Daily  empagliflozin, 10 mg, oral, Daily  enoxaparin, 30 mg, subcutaneous, q24h  ferrous sulfate (325 mg ferrous sulfate), 65 mg of iron, oral, Daily  hydrALAZINE, 50 mg, oral, TID  ipratropium-albuteroL, 3 mL, nebulization, q6h  levothyroxine, 175 mcg, oral, Nightly  NIFEdipine ER, 90 mg, oral, " Daily  oral hydration, 25 mL, oral, q1h while awake  polyethylene glycol, 17 g, oral, Daily  potassium chloride CR, 10 mEq, oral, BID      Continuous medications  sodium chloride 0.9%, 100 mL/hr, Last Rate: 100 mL/hr (12/14/24 0329)      PRN medications  PRN medications: acetaminophen **OR** acetaminophen **OR** acetaminophen, acetaminophen **OR** acetaminophen **OR** acetaminophen, guaiFENesin, ipratropium-albuteroL, ondansetron ODT **OR** ondansetron, oxygen      Relevant Results  Results reviewed     Assessment/Plan       Acute renal failure  Severe hypocalcemia  CKD 3 with baseline creatinine about 1.8  Right renal cyst  Atrophic right kidney  Anemia of chronic disease  Type 2 diabetes  Microalbuminuria  Hypertension: Resistant  Hyperlipidemia  Hypokalemia  Peripheral edema with volume overload  Normal anion gap metabolic acidosis    Plan:  We will get a 24-hour urine calcium  His calcium is being replaced  Parathyroid hormone and vitamin D levels are both in process  I will place him on oral calcium supplements  Stop IV fluids  We will follow electrolytes and renal function closely and try to work up his hypocalcemia  There is some pretty rare reasons why calcium is this low with hypoparathyroidism if his parathyroid hormone is still low  He is on Lovenox  He is also acidotic.  Acidosis can alter calcium as well  We will also place him on sodium bicarb  Continue blood pressure management as he is on  Thanks for the consult I will continue to follow along closely with you    Assessment & Plan  Hypokalemia          Mauricio Rachel, DO

## 2024-12-15 VITALS
OXYGEN SATURATION: 91 % | BODY MASS INDEX: 24.88 KG/M2 | WEIGHT: 167.99 LBS | HEIGHT: 69 IN | TEMPERATURE: 98.6 F | HEART RATE: 104 BPM | DIASTOLIC BLOOD PRESSURE: 70 MMHG | RESPIRATION RATE: 20 BRPM | SYSTOLIC BLOOD PRESSURE: 170 MMHG

## 2024-12-15 LAB
ALBUMIN SERPL BCP-MCNC: 3.1 G/DL (ref 3.4–5)
ALP SERPL-CCNC: 292 U/L (ref 33–136)
ALT SERPL W P-5'-P-CCNC: 24 U/L (ref 10–52)
ANION GAP SERPL CALC-SCNC: 16 MMOL/L (ref 10–20)
AST SERPL W P-5'-P-CCNC: 26 U/L (ref 9–39)
BACTERIA BLD CULT: NORMAL
BACTERIA BLD CULT: NORMAL
BILIRUB SERPL-MCNC: 0.2 MG/DL (ref 0–1.2)
BUN SERPL-MCNC: 31 MG/DL (ref 6–23)
CALCIUM (MG/L) IN 24 HOUR URINE: ABNORMAL
CALCIUM 24H UR-MRATE: <1 MG/DL
CALCIUM SERPL-MCNC: 5.6 MG/DL (ref 8.6–10.3)
CHLORIDE SERPL-SCNC: 99 MMOL/L (ref 98–107)
CO2 SERPL-SCNC: 19 MMOL/L (ref 21–32)
COLLECT DURATION TIME SPEC: 24 HRS
CREAT 24H UR-MCNC: 104 MG/DL (ref 20–370)
CREAT 24H UR-MRATE: 0.52 G/24 H (ref 0.87–2.41)
CREAT SERPL-MCNC: 3.08 MG/DL (ref 0.5–1.3)
EGFRCR SERPLBLD CKD-EPI 2021: 20 ML/MIN/1.73M*2
ERYTHROCYTE [DISTWIDTH] IN BLOOD BY AUTOMATED COUNT: 17.3 % (ref 11.5–14.5)
GLUCOSE SERPL-MCNC: 99 MG/DL (ref 74–99)
HCT VFR BLD AUTO: 22.4 % (ref 41–52)
HGB BLD-MCNC: 7.4 G/DL (ref 13.5–17.5)
MAGNESIUM SERPL-MCNC: 1.79 MG/DL (ref 1.6–2.4)
MCH RBC QN AUTO: 25.2 PG (ref 26–34)
MCHC RBC AUTO-ENTMCNC: 33 G/DL (ref 32–36)
MCV RBC AUTO: 76 FL (ref 80–100)
NRBC BLD-RTO: 0 /100 WBCS (ref 0–0)
PLATELET # BLD AUTO: 306 X10*3/UL (ref 150–450)
POTASSIUM SERPL-SCNC: 4.1 MMOL/L (ref 3.5–5.3)
PROT SERPL-MCNC: 6.4 G/DL (ref 6.4–8.2)
RBC # BLD AUTO: 2.94 X10*6/UL (ref 4.5–5.9)
SARS-COV-2 RNA RESP QL NAA+PROBE: NOT DETECTED
SODIUM SERPL-SCNC: 130 MMOL/L (ref 136–145)
SPECIMEN VOL 24H UR: 500 ML
WBC # BLD AUTO: 13.3 X10*3/UL (ref 4.4–11.3)

## 2024-12-15 PROCEDURE — 2500000001 HC RX 250 WO HCPCS SELF ADMINISTERED DRUGS (ALT 637 FOR MEDICARE OP): Performed by: HOSPITALIST

## 2024-12-15 PROCEDURE — 36415 COLL VENOUS BLD VENIPUNCTURE: CPT | Performed by: HOSPITALIST

## 2024-12-15 PROCEDURE — 87635 SARS-COV-2 COVID-19 AMP PRB: CPT | Performed by: INTERNAL MEDICINE

## 2024-12-15 PROCEDURE — 2500000004 HC RX 250 GENERAL PHARMACY W/ HCPCS (ALT 636 FOR OP/ED): Performed by: INTERNAL MEDICINE

## 2024-12-15 PROCEDURE — 2500000001 HC RX 250 WO HCPCS SELF ADMINISTERED DRUGS (ALT 637 FOR MEDICARE OP): Performed by: STUDENT IN AN ORGANIZED HEALTH CARE EDUCATION/TRAINING PROGRAM

## 2024-12-15 PROCEDURE — 2500000004 HC RX 250 GENERAL PHARMACY W/ HCPCS (ALT 636 FOR OP/ED): Performed by: HOSPITALIST

## 2024-12-15 PROCEDURE — 94640 AIRWAY INHALATION TREATMENT: CPT

## 2024-12-15 PROCEDURE — 2500000002 HC RX 250 W HCPCS SELF ADMINISTERED DRUGS (ALT 637 FOR MEDICARE OP, ALT 636 FOR OP/ED): Performed by: HOSPITALIST

## 2024-12-15 PROCEDURE — 97116 GAIT TRAINING THERAPY: CPT | Mod: GP,CQ | Performed by: PHYSICAL THERAPIST

## 2024-12-15 PROCEDURE — 99233 SBSQ HOSP IP/OBS HIGH 50: CPT | Performed by: HOSPITALIST

## 2024-12-15 PROCEDURE — 85027 COMPLETE CBC AUTOMATED: CPT | Performed by: HOSPITALIST

## 2024-12-15 PROCEDURE — 2500000005 HC RX 250 GENERAL PHARMACY W/O HCPCS: Performed by: EMERGENCY MEDICINE

## 2024-12-15 PROCEDURE — 81050 URINALYSIS VOLUME MEASURE: CPT | Mod: SAMLAB | Performed by: INTERNAL MEDICINE

## 2024-12-15 PROCEDURE — 2500000001 HC RX 250 WO HCPCS SELF ADMINISTERED DRUGS (ALT 637 FOR MEDICARE OP): Performed by: INTERNAL MEDICINE

## 2024-12-15 PROCEDURE — 1200000002 HC GENERAL ROOM WITH TELEMETRY DAILY

## 2024-12-15 PROCEDURE — 84075 ASSAY ALKALINE PHOSPHATASE: CPT | Performed by: INTERNAL MEDICINE

## 2024-12-15 PROCEDURE — 2500000002 HC RX 250 W HCPCS SELF ADMINISTERED DRUGS (ALT 637 FOR MEDICARE OP, ALT 636 FOR OP/ED): Performed by: STUDENT IN AN ORGANIZED HEALTH CARE EDUCATION/TRAINING PROGRAM

## 2024-12-15 PROCEDURE — 83735 ASSAY OF MAGNESIUM: CPT | Performed by: HOSPITALIST

## 2024-12-15 RX ORDER — CALCIUM GLUCONATE 98 MG/ML
1 INJECTION, SOLUTION INTRAVENOUS ONCE
Status: DISCONTINUED | OUTPATIENT
Start: 2024-12-15 | End: 2024-12-15

## 2024-12-15 RX ORDER — HYDRALAZINE HYDROCHLORIDE 25 MG/1
25 TABLET, FILM COATED ORAL ONCE
Status: COMPLETED | OUTPATIENT
Start: 2024-12-15 | End: 2024-12-15

## 2024-12-15 RX ORDER — MORPHINE SULFATE 2 MG/ML
1 INJECTION, SOLUTION INTRAMUSCULAR; INTRAVENOUS ONCE
Status: COMPLETED | OUTPATIENT
Start: 2024-12-15 | End: 2024-12-15

## 2024-12-15 RX ORDER — HYDRALAZINE HYDROCHLORIDE 25 MG/1
100 TABLET, FILM COATED ORAL 3 TIMES DAILY
Status: DISCONTINUED | OUTPATIENT
Start: 2024-12-15 | End: 2024-12-20 | Stop reason: HOSPADM

## 2024-12-15 RX ORDER — CALCIUM GLUCONATE 20 MG/ML
1 INJECTION, SOLUTION INTRAVENOUS ONCE
Status: COMPLETED | OUTPATIENT
Start: 2024-12-15 | End: 2024-12-15

## 2024-12-15 RX ORDER — LABETALOL HYDROCHLORIDE 5 MG/ML
10 INJECTION, SOLUTION INTRAVENOUS ONCE
Status: COMPLETED | OUTPATIENT
Start: 2024-12-15 | End: 2024-12-15

## 2024-12-15 RX ORDER — LABETALOL HYDROCHLORIDE 5 MG/ML
20 INJECTION, SOLUTION INTRAVENOUS ONCE
Status: COMPLETED | OUTPATIENT
Start: 2024-12-15 | End: 2024-12-15

## 2024-12-15 RX ORDER — HYDRALAZINE HYDROCHLORIDE 25 MG/1
75 TABLET, FILM COATED ORAL 3 TIMES DAILY
Status: DISCONTINUED | OUTPATIENT
Start: 2024-12-15 | End: 2024-12-15

## 2024-12-15 RX ORDER — HYDRALAZINE HYDROCHLORIDE 20 MG/ML
10 INJECTION INTRAMUSCULAR; INTRAVENOUS ONCE
Status: COMPLETED | OUTPATIENT
Start: 2024-12-15 | End: 2024-12-15

## 2024-12-15 ASSESSMENT — COGNITIVE AND FUNCTIONAL STATUS - GENERAL
CLIMB 3 TO 5 STEPS WITH RAILING: A LOT
STANDING UP FROM CHAIR USING ARMS: A LITTLE
WALKING IN HOSPITAL ROOM: A LITTLE
MOBILITY SCORE: 21
MOBILITY SCORE: 21
CLIMB 3 TO 5 STEPS WITH RAILING: A LITTLE
WALKING IN HOSPITAL ROOM: A LITTLE
DAILY ACTIVITIY SCORE: 24

## 2024-12-15 ASSESSMENT — PAIN SCALES - GENERAL
PAINLEVEL_OUTOF10: 0 - NO PAIN

## 2024-12-15 ASSESSMENT — PAIN - FUNCTIONAL ASSESSMENT
PAIN_FUNCTIONAL_ASSESSMENT: 0-10

## 2024-12-15 NOTE — SIGNIFICANT EVENT
Patient's blood pressure has been consistently high the past 60 minutes.  He does have a history of refractory hypertension.  He is asymptomatic.  No shortness of breath or chest pain or palpitations.  I had given the patient a dose of labetalol 10 mg and then a dose of hydralazine 10 mg.  Blood pressure is 177/73.  I will give another dose of labetalol 10 mg because the heart rate is 103.

## 2024-12-15 NOTE — CARE PLAN
The patient's goals for the shift include  decreased shortness of breath  Problem: Nutrition  Goal: Oral intake greater than 50%  Outcome: Progressing  Goal: Oral intake greater 75%  Outcome: Progressing  Goal: Consume prescribed supplement  Outcome: Progressing  Goal: Adequate PO fluid intake  Outcome: Progressing  Goal: Nutrition support goals are met within 48 hrs  Outcome: Progressing  Goal: Nutrition support is meeting 75% of nutrient needs  Outcome: Progressing  Goal: BG  mg/dL  Outcome: Progressing  Goal: Lab values WNL  Outcome: Progressing  Goal: Electrolytes WNL  Outcome: Progressing  Goal: Promote healing  Outcome: Progressing  Goal: Maintain stable weight  Outcome: Progressing  Goal: Gradual weight gain  Outcome: Progressing       The clinical goals for the shift include Patient will remian safe this shift.

## 2024-12-15 NOTE — PROGRESS NOTES
"Physical Therapy    Physical Therapy Treatment    Patient Name: Garcia Haskins  MRN: [id number]  Department: [facility]  Room: [address] [place] [address]  Today's Date: 12/15/2024  Time Calculation  Start Time: 1315  Stop Time: 1330  Time Calculation (min): 15 min         Assessment/Plan   PT Assessment  PT Assessment Results: Decreased endurance, Decreased coordination  Medical Staff Made Aware: Yes  End of Session Communication: Bedside nurse  Assessment Comment: Pt required cues for decreased pace and correct FWW placement, pt was impetious and displaye dincreased LOB during directional turns, pt declined futher activities post gait per increased fatigue.  End of Session Patient Position: Bed, 2 rail up, Alarm on     PT Plan  Treatment/Interventions: Bed mobility, Transfer training, Gait training, Balance training, Neuromuscular re-education, Strengthening, Stair training, Endurance training, Therapeutic exercise, Therapeutic activity, Home exercise program  PT Plan: Ongoing PT  PT Frequency: 4 times per week  PT Discharge Recommendations: Low intensity level of continued care  Equipment Recommended upon Discharge: Wheeled walker  PT Recommended Transfer Status: Stand by assist, Contact guard      General Visit Information:   PT  Visit  PT Received On: 12/15/24  General  Reason for Referral: Impaired Mobility  Referred By: Kenton POST  Past Medical History Relevant to Rehab: Hx of GLENIS, hypokalemia, hypomagnesemia, hypocalcemia, CKDII, HTN, CVA, DM2, hyperlipidemia, hypothyroidism  Prior to Session Communication: Bedside nurse  Patient Position Received: Bed, 0 rail up  General Comment:  (pt was sitting on the EOB)    Subjective   \"Im willing to get up and try to walk.\"  Precautions:  Fall Risk     Vital Signs (Past 2hrs)        Date/Time Vitals Session Patient Position Pulse Resp SpO2 BP MAP (mmHg)    12/15/24 1310 --  --  86  20  95 %  181/73  109                   Vital Signs Comment: 3L O2     Objective   Pain:  Pain Assessment  Pain " Assessment: 0-10  0-10 (Numeric) Pain Score: 0 - No pain  Cognition:     Coordination:     Postural Control:     Activity Tolerance:  Activity Tolerance  Endurance: Tolerates less than 10 min exercise with changes in vital signs  Treatments:  Ambulation/Gait Training 1  Surface 1: Level tile  Device 1: Rolling walker  Gait Support Devices: Gait belt  Assistance 1: Contact guard  Quality of Gait 1: Inconsistent stride length, Decreased step length  Comments/Distance (ft) 1: 20' w/ directional turns.    Outcome Measures:    [facility] Basic Mobility  Turning from your back to your side while in a flat bed without using bedrails: None  Moving from lying on your back to sitting on the side of a flat bed without using bedrails: None  Moving to and from bed to chair (including a wheelchair): None  Standing up from a chair using your arms (e.g. wheelchair or bedside chair): None  To walk in hospital room: A little  Climbing 3-5 steps with railing: A lot  Basic Mobility - Total Score: 21    Education Documentation  Mobility Training, taught by Fareed De Jesus PTA at 12/15/2024  1:43 PM.  Learner: Patient  Readiness: Acceptance  Method: Explanation  Response: Needs Reinforcement  Comment: cues required for decreased pace during mobilty to reduce the patients risk of falls    Education Comments  No comments found.        OP EDUCATION:       Encounter Problems       Encounter Problems (Active)       PT Goals       Bed Mobility       Start:  12/14/24    Expected End:  12/28/24       Patient will transfer supine<>sitting edge of bed without use of handrail independently to improve functional mobility toward baseline upon discharge.           Transfers       Start:  12/14/24    Expected End:  12/28/24       Patient will transfer sit<>stand with modified independence with use of least restrictive assistive device to improve functional mobility toward baseline upon discharge           Ambulation       Start:  12/14/24    Expected End:   12/28/24       Patient will ambulate 75 feet with use of least restrictive assistive device with SBA while demonstrating good safety and balance without significant changes in vital signs to improve functional mobility toward baseline upon discharge           Strength       Start:  12/14/24    Expected End:  12/28/24       Patient will demonstrate gross B LE musculature strength 5/5 MMT to improve stability with completion of transfers and ambulation toward baseline upon discharge              Pain - Adult

## 2024-12-15 NOTE — PROGRESS NOTES
"Garcia Haskins is a 74 y.o. male on day 2 of admission presenting with Hypokalemia.    Subjective   Shortness of breath wheezing  Occasional cough  Hypertensive  Hypocalcemia  Poor nutritional intake  Generalized weakness       Objective     Physical Exam  General Appearance: AAO x 3, fatigue tired slightly cachectic looking  Skin: skin color pink, warm, and dry; no suspicious rashes or lesions  Eyes : PERRL, EOM's intact  ENT: mucous membranes pink and moist  Neck: normocephalic  Respiratory: Wheezing  Heart: regular rate and rhythm.   Abdomen: Nondistended, positive bowel sounds x4, soft,  nontender  Extremities: no edema   Peripheral pulses: normal x4 extremities  Neuro: alert, coherent and conversant, no focal motor deficits  Last Recorded Vitals  Blood pressure (!) 181/73, pulse 86, temperature 36.6 °C (97.9 °F), temperature source Temporal, resp. rate 20, height 1.753 m (5' 9.02\"), weight 76.2 kg (167 lb 15.9 oz), SpO2 95%.  Intake/Output last 3 Shifts:  I/O last 3 completed shifts:  In: 4168.3 (54.7 mL/kg) [P.O.:2170; I.V.:1748.3 (22.9 mL/kg); IV Piggyback:250]  Out: 425 (5.6 mL/kg) [Urine:425 (0.2 mL/kg/hr)]  Weight: 76.2 kg     Relevant Results    Scheduled medications  azithromycin, 500 mg, oral, q24h RYLIE  calcium carbonate, 1,000 mg, oral, TID  cefTRIAXone, 2 g, intravenous, q24h  clopidogrel, 75 mg, oral, Daily  empagliflozin, 10 mg, oral, Daily  enoxaparin, 30 mg, subcutaneous, q24h  ferrous sulfate (325 mg ferrous sulfate), 65 mg of iron, oral, Daily  hydrALAZINE, 25 mg, oral, Once  hydrALAZINE, 75 mg, oral, TID  ipratropium-albuteroL, 3 mL, nebulization, q4h  levothyroxine, 175 mcg, oral, Nightly  magnesium oxide, 400 mg, oral, Daily  NIFEdipine ER, 90 mg, oral, Daily  oral hydration, 25 mL, oral, q1h while awake  polyethylene glycol, 17 g, oral, Daily  potassium chloride CR, 10 mEq, oral, BID  sodium bicarbonate, 1,300 mg, oral, BID  sodium chloride, 1 spray, Each Nostril, 4x daily      Continuous " medications     PRN medications  PRN medications: acetaminophen **OR** acetaminophen **OR** acetaminophen, acetaminophen **OR** acetaminophen **OR** acetaminophen, guaiFENesin, ipratropium-albuteroL, ondansetron ODT **OR** ondansetron, oxygen    Results for orders placed or performed during the hospital encounter of 12/13/24 (from the past 24 hours)   Sars-CoV-2 PCR   Result Value Ref Range    Coronavirus 2019, PCR Not Detected Not Detected   Comprehensive Metabolic Panel   Result Value Ref Range    Glucose 99 74 - 99 mg/dL    Sodium 130 (L) 136 - 145 mmol/L    Potassium 4.1 3.5 - 5.3 mmol/L    Chloride 99 98 - 107 mmol/L    Bicarbonate 19 (L) 21 - 32 mmol/L    Anion Gap 16 10 - 20 mmol/L    Urea Nitrogen 31 (H) 6 - 23 mg/dL    Creatinine 3.08 (H) 0.50 - 1.30 mg/dL    eGFR 20 (L) >60 mL/min/1.73m*2    Calcium 5.6 (LL) 8.6 - 10.3 mg/dL    Albumin 3.1 (L) 3.4 - 5.0 g/dL    Alkaline Phosphatase 292 (H) 33 - 136 U/L    Total Protein 6.4 6.4 - 8.2 g/dL    AST 26 9 - 39 U/L    Bilirubin, Total 0.2 0.0 - 1.2 mg/dL    ALT 24 10 - 52 U/L   CBC   Result Value Ref Range    WBC 13.3 (H) 4.4 - 11.3 x10*3/uL    nRBC 0.0 0.0 - 0.0 /100 WBCs    RBC 2.94 (L) 4.50 - 5.90 x10*6/uL    Hemoglobin 7.4 (L) 13.5 - 17.5 g/dL    Hematocrit 22.4 (L) 41.0 - 52.0 %    MCV 76 (L) 80 - 100 fL    MCH 25.2 (L) 26.0 - 34.0 pg    MCHC 33.0 32.0 - 36.0 g/dL    RDW 17.3 (H) 11.5 - 14.5 %    Platelets 306 150 - 450 x10*3/uL   Magnesium   Result Value Ref Range    Magnesium 1.79 1.60 - 2.40 mg/dL     XR shoulder right 2+ views    Result Date: 12/13/2024  Interpreted By:  Vasyl Rachel, STUDY: XR SHOULDER RIGHT 2+ VIEWS   INDICATION: Signs/Symptoms:abn CXR.   COMPARISON: Chest radiographs earlier the same day   ACCESSION NUMBER(S): [id number]   ORDERING CLINICIAN: HARITHA SOUZA   FINDINGS: There is some glenoid rim sclerosis and cystic change with some possible cortical thickening and coarsening of the trabecula. Findings raise concern for  possible Paget's disease.   No evidence of acute fracture or dislocation.         There is some glenoid rim sclerosis and cystic change with some possible cortical thickening and coarsening of the trabecula. Findings raise concern for possible Paget's disease.   No evidence of acute fracture or dislocation.   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  Plan  Continue cardiopulmonary monitoring and watch QT interval  Replace potassium, magnesium, calcium appropriately  Continue maintenance therapy, calcium carbonate  Follow vitamin D level  TSH was okay on Synthroid  Watch volume status  Nephrology following  On ceftriaxone and azithromycin for pneumonia  Nebs treatments  Follow cultures  Lovenox for DVT prophylaxis  Continue current medicines  Jardiance, Plavix, nifedipine, hydralazine  Follow vitals  Iron supplement for anemia  Symptomatic management  Synthroid for hypothyroidism  To follow rheumatology and orthopedic outpatient  Tylenol for mild pain  PT OT  Nutrition  On bicarb tablets for metabolic acidosis  May consider Epogen for anemia as deemed appropriate by nephrology in case of renal decline  Daily CBC BMP and monitor urine output  Follow vitals                               Shayan Fung MD

## 2024-12-15 NOTE — CARE PLAN
Problem: Nutrition  Goal: Oral intake greater than 50%  Outcome: Progressing  Goal: Oral intake greater 75%  Outcome: Progressing  Goal: Consume prescribed supplement  Outcome: Progressing  Goal: Adequate PO fluid intake  Outcome: Progressing  Goal: Nutrition support goals are met within 48 hrs  Outcome: Progressing  Goal: Nutrition support is meeting 75% of nutrient needs  Outcome: Progressing  Goal: BG  mg/dL  Outcome: Progressing  Goal: Lab values WNL  Outcome: Progressing  Goal: Electrolytes WNL  Outcome: Progressing  Goal: Promote healing  Outcome: Progressing  Goal: Maintain stable weight  Outcome: Progressing  Goal: Gradual weight gain  Outcome: Progressing     Problem: Diabetes  Goal: Achieve decreasing blood glucose levels by end of shift  Outcome: Progressing  Goal: Increase stability of blood glucose readings by end of shift  Outcome: Progressing  Goal: Decrease in ketones present in urine by end of shift  Outcome: Progressing  Goal: Maintain electrolyte levels within acceptable range throughout shift  Outcome: Progressing  Goal: Maintain glucose levels >70mg/dl to <250mg/dl throughout shift  Outcome: Progressing  Goal: No changes in neurological exam by end of shift  Outcome: Progressing  Goal: Learn about and adhere to nutrition recommendations by end of shift  Outcome: Progressing  Goal: Vital signs within normal range for age by end of shift  Outcome: Progressing  Goal: Increase self care and/or family involovement by end of shift  Outcome: Progressing  Goal: Receive DSME education by end of shift  Outcome: Progressing     Problem: Skin  Goal: Participates in plan/prevention/treatment measures  Outcome: Progressing  Goal: Prevent/manage excess moisture  Outcome: Progressing  Goal: Prevent/minimize sheer/friction injuries  Outcome: Progressing  Goal: Promote/optimize nutrition  Outcome: Progressing     Problem: Pain - Adult  Goal: Verbalizes/displays adequate comfort level or baseline comfort  level  Outcome: Progressing     Problem: Safety - Adult  Goal: Free from fall injury  Outcome: Progressing     Problem: Discharge Planning  Goal: Discharge to home or other facility with appropriate resources  Outcome: Progressing     Problem: Chronic Conditions and Co-morbidities  Goal: Patient's chronic conditions and co-morbidity symptoms are monitored and maintained or improved  Outcome: Progressing   The patient's goals for the shift include      The clinical goals for the shift include Patient will remian safe this shift.

## 2024-12-16 LAB
ANION GAP SERPL CALC-SCNC: 17 MMOL/L
BUN SERPL-MCNC: 32 MG/DL (ref 6–23)
CALCIUM SERPL-MCNC: 6.2 MG/DL (ref 8.6–10.3)
CHLORIDE SERPL-SCNC: 97 MMOL/L (ref 98–107)
CO2 SERPL-SCNC: 19 MMOL/L (ref 21–32)
CREAT SERPL-MCNC: 3.01 MG/DL (ref 0.5–1.3)
EGFRCR SERPLBLD CKD-EPI 2021: 21 ML/MIN/1.73M*2
ERYTHROCYTE [DISTWIDTH] IN BLOOD BY AUTOMATED COUNT: 16.9 % (ref 11.5–14.5)
GLUCOSE SERPL-MCNC: 98 MG/DL (ref 74–99)
HCT VFR BLD AUTO: 23.5 % (ref 41–52)
HGB BLD-MCNC: 7.7 G/DL (ref 13.5–17.5)
MCH RBC QN AUTO: 25.2 PG (ref 26–34)
MCHC RBC AUTO-ENTMCNC: 32.8 G/DL (ref 32–36)
MCV RBC AUTO: 77 FL (ref 80–100)
NRBC BLD-RTO: 0 /100 WBCS (ref 0–0)
PLATELET # BLD AUTO: 362 X10*3/UL (ref 150–450)
POTASSIUM SERPL-SCNC: 4.1 MMOL/L (ref 3.5–5.3)
RBC # BLD AUTO: 3.05 X10*6/UL (ref 4.5–5.9)
SODIUM SERPL-SCNC: 129 MMOL/L (ref 136–145)
WBC # BLD AUTO: 12.9 X10*3/UL (ref 4.4–11.3)

## 2024-12-16 PROCEDURE — 85027 COMPLETE CBC AUTOMATED: CPT | Performed by: HOSPITALIST

## 2024-12-16 PROCEDURE — 2500000002 HC RX 250 W HCPCS SELF ADMINISTERED DRUGS (ALT 637 FOR MEDICARE OP, ALT 636 FOR OP/ED): Performed by: HOSPITALIST

## 2024-12-16 PROCEDURE — 82270 OCCULT BLOOD FECES: CPT | Mod: SAMLAB | Performed by: INTERNAL MEDICINE

## 2024-12-16 PROCEDURE — 2500000004 HC RX 250 GENERAL PHARMACY W/ HCPCS (ALT 636 FOR OP/ED): Performed by: INTERNAL MEDICINE

## 2024-12-16 PROCEDURE — 82374 ASSAY BLOOD CARBON DIOXIDE: CPT | Performed by: HOSPITALIST

## 2024-12-16 PROCEDURE — 99233 SBSQ HOSP IP/OBS HIGH 50: CPT | Performed by: HOSPITALIST

## 2024-12-16 PROCEDURE — 99222 1ST HOSP IP/OBS MODERATE 55: CPT | Performed by: STUDENT IN AN ORGANIZED HEALTH CARE EDUCATION/TRAINING PROGRAM

## 2024-12-16 PROCEDURE — 2500000004 HC RX 250 GENERAL PHARMACY W/ HCPCS (ALT 636 FOR OP/ED): Performed by: STUDENT IN AN ORGANIZED HEALTH CARE EDUCATION/TRAINING PROGRAM

## 2024-12-16 PROCEDURE — 94761 N-INVAS EAR/PLS OXIMETRY MLT: CPT

## 2024-12-16 PROCEDURE — 2500000001 HC RX 250 WO HCPCS SELF ADMINISTERED DRUGS (ALT 637 FOR MEDICARE OP): Performed by: HOSPITALIST

## 2024-12-16 PROCEDURE — 2500000001 HC RX 250 WO HCPCS SELF ADMINISTERED DRUGS (ALT 637 FOR MEDICARE OP): Performed by: INTERNAL MEDICINE

## 2024-12-16 PROCEDURE — 2500000004 HC RX 250 GENERAL PHARMACY W/ HCPCS (ALT 636 FOR OP/ED): Performed by: HOSPITALIST

## 2024-12-16 PROCEDURE — 99233 SBSQ HOSP IP/OBS HIGH 50: CPT | Performed by: INTERNAL MEDICINE

## 2024-12-16 PROCEDURE — 2500000005 HC RX 250 GENERAL PHARMACY W/O HCPCS: Performed by: EMERGENCY MEDICINE

## 2024-12-16 PROCEDURE — 36415 COLL VENOUS BLD VENIPUNCTURE: CPT | Performed by: HOSPITALIST

## 2024-12-16 PROCEDURE — 97165 OT EVAL LOW COMPLEX 30 MIN: CPT | Mod: GO

## 2024-12-16 PROCEDURE — 1200000002 HC GENERAL ROOM WITH TELEMETRY DAILY

## 2024-12-16 PROCEDURE — 97116 GAIT TRAINING THERAPY: CPT | Mod: GP | Performed by: PHYSICAL THERAPIST

## 2024-12-16 RX ORDER — FUROSEMIDE 10 MG/ML
40 INJECTION INTRAMUSCULAR; INTRAVENOUS ONCE
Status: COMPLETED | OUTPATIENT
Start: 2024-12-16 | End: 2024-12-16

## 2024-12-16 RX ORDER — MORPHINE SULFATE 2 MG/ML
1 INJECTION, SOLUTION INTRAMUSCULAR; INTRAVENOUS EVERY 4 HOURS PRN
Status: DISCONTINUED | OUTPATIENT
Start: 2024-12-16 | End: 2024-12-20 | Stop reason: HOSPADM

## 2024-12-16 RX ORDER — METOPROLOL TARTRATE 1 MG/ML
5 INJECTION, SOLUTION INTRAVENOUS EVERY 6 HOURS PRN
Status: DISCONTINUED | OUTPATIENT
Start: 2024-12-16 | End: 2024-12-19

## 2024-12-16 ASSESSMENT — COGNITIVE AND FUNCTIONAL STATUS - GENERAL
DAILY ACTIVITIY SCORE: 19
DRESSING REGULAR LOWER BODY CLOTHING: A LITTLE
DRESSING REGULAR UPPER BODY CLOTHING: A LITTLE
STANDING UP FROM CHAIR USING ARMS: A LITTLE
DAILY ACTIVITIY SCORE: 20
WALKING IN HOSPITAL ROOM: A LITTLE
WALKING IN HOSPITAL ROOM: A LOT
DRESSING REGULAR LOWER BODY CLOTHING: A LITTLE
WALKING IN HOSPITAL ROOM: A LITTLE
STANDING UP FROM CHAIR USING ARMS: A LITTLE
MOBILITY SCORE: 20
DRESSING REGULAR UPPER BODY CLOTHING: A LITTLE
HELP NEEDED FOR BATHING: A LITTLE
MOBILITY SCORE: 21
TOILETING: A LITTLE
MOVING TO AND FROM BED TO CHAIR: A LITTLE
PERSONAL GROOMING: A LITTLE
CLIMB 3 TO 5 STEPS WITH RAILING: TOTAL
TOILETING: A LITTLE
CLIMB 3 TO 5 STEPS WITH RAILING: A LITTLE
CLIMB 3 TO 5 STEPS WITH RAILING: A LOT
MOBILITY SCORE: 18
DAILY ACTIVITIY SCORE: 24
HELP NEEDED FOR BATHING: A LITTLE

## 2024-12-16 ASSESSMENT — PAIN - FUNCTIONAL ASSESSMENT
PAIN_FUNCTIONAL_ASSESSMENT: 0-10

## 2024-12-16 ASSESSMENT — PAIN SCALES - GENERAL
PAINLEVEL_OUTOF10: 0 - NO PAIN

## 2024-12-16 ASSESSMENT — ACTIVITIES OF DAILY LIVING (ADL)
ADL_ASSISTANCE: INDEPENDENT
BATHING_ASSISTANCE: STAND BY

## 2024-12-16 NOTE — PROGRESS NOTES
Occupational Therapy    Evaluation    Patient Name: Garcia Haskins  MRN: [id number]  Today's Date: 12/16/2024  Time Calculation  Start Time: 0729  Stop Time: 0749  Time Calculation (min): 20 min  311/311-A    Assessment  IP OT Assessment  OT Assessment: pt normally does not use AD for ADL and ambulation at [facility].  pt with multiple LOB without AD during ADL/mobility.  pt is recommended to use FWW which improves balance yet pt leaving FWW behind with functional tasks placing him to be a high fall risk at home.  pt unable to manage O2 tubing without assist.  pt also with poor activity tolerance and quickly becomes SOB with simple ADL tasks.  Prognosis: Good  Barriers to Discharge Home: Caregiver assistance, Cognition needs  Caregiver Assistance: Patient lives alone and/or does not have reliable caregiver assistance  Cognition Needs: Cognition-related high falls risk, Other (Comment) (unable to manage O2 cord, needs to use FWW but leaves behind during assessment)  Evaluation/Treatment Tolerance: Patient limited by fatigue  End of Session Communication: Care Coordinator (communication on white board)  End of Session Patient Position: Up in chair, Alarm on (call light in reach)    Plan:  Treatment Interventions: ADL retraining, Functional transfer training, Endurance training, Patient/family training, Equipment evaluation/education, Neuromuscular reeducation, Compensatory technique education  OT Frequency: 3 times per week  OT Discharge Recommendations: Moderate intensity level of continued care, Low intensity level of continued care  Equipment Recommended upon Discharge: Wheeled walker  OT Recommended Transfer Status: Assist of 1  OT - OK to Discharge: Yes (once medically stable)    Subjective     Current Problem:  1. Hypokalemia        2. Pneumonia of right lower lobe due to infectious organism        3. Hypomagnesemia        4. Hypocalcemia        5. GLENIS (acute kidney injury) (CMS-[facility])             General:  General  Reason for Referral: Garcia Haskins is a 74 y.o. male presenting with generalized weakness for a week and tired all the time.- hypocalcemia  Referred By: Kenton POST  Past Medical History Relevant to Rehab: Hx of GLENIS, hypokalemia, hypomagnesemia, hypocalcemia, CKDII, HTN, CVA, DM2, hyperlipidemia, hypothyroidism  Family/Caregiver Present: No  Prior to Session Communication: Bedside nurse  Patient Position Received: Bed, 2 rail up, Alarm on  General Comment: pt agreable to assessment    Precautions:  Medical Precautions: Fall precautions, Oxygen therapy device and L/min (3lpm)    Vital Signs:  SpO2: 94 %    Pain:  Pain Assessment  Pain Assessment: 0-10  0-10 (Numeric) Pain Score: 0 - No pain    Objective     Cognition:  Overall Cognitive Status: Within Functional Limits  Orientation Level: Oriented X4     Home Living:  Type of Home: House  Lives With: Alone  Home Adaptive Equipment: Cane  Home Layout: One level  Home Access: Stairs to enter with rails  Entrance Stairs-Rails: Right  Entrance Stairs-Number of Steps: 3     Prior Function:  ADL Assistance: Independent  Homemaking Assistance: Independent  Ambulatory Assistance: Independent (no device)    ADL:  Eating Assistance: Independent  Grooming Assistance: Stand by  Bathing Assistance: Stand by  UE Dressing Assistance: Stand by  LE Dressing Assistance: Stand by  Toileting Assistance with Device: Stand by  ADL Comments: pt with multiple LOB without device    Activity Tolerance:  Endurance: Decreased tolerance for upright activites, Tolerates less than 10 min exercise, no significant change in vital signs    Bed Mobility/Transfers:   Bed Mobility  Bed Mobility: Yes  Bed Mobility 1  Bed Mobility 1: Supine to sitting  Level of Assistance 1: Distant supervision  Transfer 1  Technique 1: Sit to stand  Transfer Device 1: Gait belt  Transfer Level of Assistance 1: Contact guard    Ambulation/Gait Training:  Functional Mobility  Functional Mobility  Performed: Yes  Functional Mobility 1  Surface 1: Level tile  Device 1: No device  Functional Mobility Support Devices: Gait belt  Assistance 1: Minimum assistance  Comments 1: multiple LOB, unable to self correct  Functional Mobility 2  Surface 2: Level tile  Device 2: Rolling walker  Functional Mobility Support Devices: Gait belt  Assistance 2: Contact guard  Comments 2: quick fatigue.  unable to manage O2 cord, poor safety often leaving FWW behind    Vision: Vision - Basic Assessment  Current Vision: Wears glasses all the time    Strength:  Strength Comments: BUE Fair+    Coordination:  Movements are Fluid and Coordinated: Yes       Outcome Measures: [facility] Daily Activity  Putting on and taking off regular lower body clothing: A little  Bathing (including washing, rinsing, drying): A little  Putting on and taking off regular upper body clothing: A little  Toileting, which includes using toilet, bedpan or urinal: A little  Taking care of personal grooming such as brushing teeth: A little  Eating Meals: None  Daily Activity - Total Score: 19                       EDUCATION:     Education Documentation  Body Mechanics, taught by Deanna De Jesus OT at 12/16/2024 10:30 AM.  Learner: Patient  Readiness: Acceptance  Method: Explanation, Demonstration  Response: Verbalizes Understanding, Demonstrated Understanding, Needs Reinforcement  Comment: safety during ADL and mobility    Precautions, taught by Deanna De Jesus OT at 12/16/2024 10:30 AM.  Learner: Patient  Readiness: Acceptance  Method: Explanation, Demonstration  Response: Verbalizes Understanding, Demonstrated Understanding, Needs Reinforcement  Comment: safety during ADL and mobility    ADL Training, taught by Deanna De Jesus OT at 12/16/2024 10:30 AM.  Learner: Patient  Readiness: Acceptance  Method: Explanation, Demonstration  Response: Verbalizes Understanding, Demonstrated Understanding, Needs Reinforcement  Comment: safety during ADL and mobility    Education  Comments  No comments found.        Goals:   Encounter Problems       Encounter Problems (Active)       ADLs       Patient with complete lower body dressing with modified independent level of assistance   (Progressing)       Start:  12/16/24    Expected End:  12/30/24            Patient will complete toileting including hygiene clothing management/hygiene with modified independent level of assistance . (Progressing)       Start:  12/16/24    Expected End:  12/30/24            pt will tolerate 25 minutes of ADL with all vitals WNL (Progressing)       Start:  12/16/24    Expected End:  12/30/24               BALANCE       Pt will maintain dynamic standing balance during ADL task with modified independent level of assistance in order to demonstrate decreased risk of falling and improved postural control. (Progressing)       Start:  12/16/24    Expected End:  12/30/24

## 2024-12-16 NOTE — PROGRESS NOTES
Garcia Haskins is a 74 y.o. male on day 3 of admission presenting with Hypokalemia.      Subjective   Patient seen and examined at the bedside this morning  He is sitting comfortably in the bedside chair  He states he still feels overall weak and not back to his normal self       Objective          Vitals 24HR  Heart Rate:  []   Temp:  [36.5 °C (97.7 °F)-37.1 °C (98.7 °F)]   Resp:  [18-24]   BP: (165-187)/(52-77)   SpO2:  [90 %-96 %]         Intake/Output last 3 Shifts:    Intake/Output Summary (Last 24 hours) at 12/16/2024 1123  Last data filed at 12/16/2024 0900  Gross per 24 hour   Intake 1300 ml   Output 420 ml   Net 880 ml       Physical Exam  Constitutional:       Appearance: Normal appearance.   HENT:      Head: Normocephalic and atraumatic.      Right Ear: External ear normal.      Left Ear: External ear normal.      Nose: Nose normal.      Mouth/Throat:      Mouth: Mucous membranes are moist.      Pharynx: Oropharynx is clear.   Eyes:      Extraocular Movements: Extraocular movements intact.      Conjunctiva/sclera: Conjunctivae normal.      Pupils: Pupils are equal, round, and reactive to light.   Cardiovascular:      Rate and Rhythm: Normal rate and regular rhythm.   Pulmonary:      Effort: Pulmonary effort is normal.      Breath sounds: Normal breath sounds.   Abdominal:      General: Abdomen is flat.      Palpations: Abdomen is soft.   Musculoskeletal:         General: Swelling present.   Skin:     General: Skin is warm and dry.   Neurological:      General: No focal deficit present.      Mental Status: He is alert and oriented to person, place, and time.   Psychiatric:         Mood and Affect: Mood normal.         Behavior: Behavior normal.     Scheduled medications  calcium carbonate, 1,000 mg, oral, TID  cefTRIAXone, 2 g, intravenous, q24h  clopidogrel, 75 mg, oral, Daily  empagliflozin, 10 mg, oral, Daily  enoxaparin, 30 mg, subcutaneous, q24h  ferrous sulfate (325 mg ferrous sulfate), 65 mg  of iron, oral, Daily  hydrALAZINE, 100 mg, oral, TID  ipratropium-albuteroL, 3 mL, nebulization, q4h  levothyroxine, 175 mcg, oral, Nightly  magnesium oxide, 400 mg, oral, Daily  NIFEdipine ER, 90 mg, oral, Daily  oral hydration, 25 mL, oral, q1h while awake  polyethylene glycol, 17 g, oral, Daily  potassium chloride CR, 10 mEq, oral, BID  sodium bicarbonate, 1,300 mg, oral, BID  sodium chloride, 1 spray, Each Nostril, 4x daily      Continuous medications     PRN medications  PRN medications: acetaminophen **OR** acetaminophen **OR** acetaminophen, acetaminophen **OR** acetaminophen **OR** acetaminophen, guaiFENesin, ipratropium-albuteroL, metoprolol, morphine, ondansetron ODT **OR** ondansetron, oxygen      Relevant Results               Assessment/Plan              Assessment & Plan  Hypokalemia    Acute renal failure: Improving slowly  Severe hypocalcemia: Improving  CKD 3 with baseline creatinine about 1.8  Right renal cyst  Atrophic right kidney  Anemia of chronic disease  Type 2 diabetes  Microalbuminuria  Hypertension: Resistant  Hyperlipidemia  Hypokalemia  Peripheral edema with volume overload  Normal anion gap metabolic acidosis  Hyponatremia    Plan:  A vitamin D level was not drawn  PTH is still suppressed and a 24-hour urine calcium shows that he is not spilling any calcium in his urine  This is likely going to be a rare PTH disorder will likely need to see endocrine as an outpatient  I would recommend continuing sodium bicarb at this time  I will give him a dose of Lasix and we will continue to follow renal function  Lasix can cause some calcium loss we will have to be careful with Lasix  Will continue to follow closely             Mauricio Rachel, DO

## 2024-12-16 NOTE — CONSULTS
Inpatient consult to Cardiology  Consult performed by: Luc Westbrook MD  Consult ordered by: Shayan Fung MD  Reason for consult: hypertension      History Of Present Illness:    Mr. Garcia Haskins is a 74 y.o. former smoker male being consulted by the Cardiology team for hypertension. Patient with prior medical history significant for CVA, Hypertension, Type 2 diabetes mellitus, Hyperlipidemia, Chronic kidney disease stage III, Hypothyroidism. He presented to ED [facility] on 12/15/2024 complaining of generalized weakness for one week and persistent fatigue. He denied experiencing a sore throat, runny nose, sinus or nasal congestion, cough, fever, hemoptysis, shortness of breath, chest pain, palpitations, dizziness, worsening leg swelling, headache, or focal weakness. Recent lab work ordered by nephrology revealed abnormal electrolytes, including hypokalemia, hypomagnesemia, hypocalcemia, and acute kidney injury (GLENIS). The patient reports poor appetite and reduced oral intake but no significant weight loss. There is limited mobility and some functional decline. EKG shows normal sinus rhythm with no signs of acute ischemic changes. He has been admitted for clinical compensation.     Last Recorded Vitals:  Vitals:    12/15/24 2300 12/16/24 0127 12/16/24 0131 12/16/24 0135   BP: 170/70 (!) 187/52 177/77 177/76   BP Location:       Patient Position: Lying      Pulse: 104   108   Resp: 20      Temp: 37 °C (98.6 °F)      TempSrc: Temporal      SpO2: 91%  92%    Weight:       Height:           Last Labs:  CBC - 12/15/2024:  4:36 AM  13.3 7.4 306    22.4      CMP - 12/15/2024:  4:36 AM  5.6 6.4 26 --- 0.2   4.6 3.1 24 292      PTT - No results in last year.  _   _ _     Hemoglobin A1C   Date/Time Value Ref Range Status   08/22/2024 11:02 AM 6.7 (H) see below % Final   12/26/2023 11:29 AM 7.7 (H) see below % Final     VLDL   Date/Time Value Ref Range Status   06/26/2023 10:38 AM 33 0 - 40 mg/dL Final     "  Last I/O:  I/O last 3 completed shifts:  In: 3670 (48.2 mL/kg) [P.O.:2760; I.V.:660 (8.7 mL/kg); IV Piggyback:250]  Out: 770 (10.1 mL/kg) [Urine:770 (0.3 mL/kg/hr)]  Weight: 76.2 kg     Past Cardiology Tests (Last 3 Years):  EKG:  ECG 12 lead 12/13/2024    Echo:  No results found for this or any previous visit from the past 1095 days.    Ejection Fractions:  No results found for: \"EF\"  Cath:  No results found for this or any previous visit from the past 1095 days.    Stress Test:  No results found for this or any previous visit from the past 1095 days.    Cardiac Imaging:  No results found for this or any previous visit from the past 1095 days.      Past Medical History:  He has a past medical history of Personal history of colonic polyps, Personal history of other diseases of the digestive system, Personal history of other diseases of the nervous system and sense organs, and Personal history of transient ischemic attack (TIA), and cerebral infarction without residual deficits (01/27/2022).    Past Surgical History:  He has a past surgical history that includes Other surgical history (01/13/2020); Other surgical history (01/13/2020); Other surgical history (01/13/2020); and Other surgical history (01/13/2020).      Social History:  He reports that he has quit smoking. His smoking use included cigarettes. He has never used smokeless tobacco. He reports that he does not currently use alcohol. He reports that he does not use drugs.    Family History:  Family History   Problem Relation Name Age of Onset    Other (cardiac disorder) Mother      Stroke Mother      Other (cardiac disorder) Father      Stroke Maternal Grandmother          Allergies:  Penicillins and Sulfa (sulfonamide antibiotics)    Inpatient Medications:  Scheduled medications   Medication Dose Route Frequency    calcium carbonate  1,000 mg oral TID    cefTRIAXone  2 g intravenous q24h    clopidogrel  75 mg oral Daily    empagliflozin  10 mg oral Daily "    enoxaparin  30 mg subcutaneous q24h    ferrous sulfate (325 mg ferrous sulfate)  65 mg of iron oral Daily    hydrALAZINE  100 mg oral TID    ipratropium-albuteroL  3 mL nebulization q4h    levothyroxine  175 mcg oral Nightly    magnesium oxide  400 mg oral Daily    NIFEdipine ER  90 mg oral Daily    oral hydration  25 mL oral q1h while awake    polyethylene glycol  17 g oral Daily    potassium chloride CR  10 mEq oral BID    sodium bicarbonate  1,300 mg oral BID    sodium chloride  1 spray Each Nostril 4x daily     PRN medications   Medication    acetaminophen    Or    acetaminophen    Or    acetaminophen    acetaminophen    Or    acetaminophen    Or    acetaminophen    guaiFENesin    ipratropium-albuteroL    metoprolol    morphine    ondansetron ODT    Or    ondansetron    oxygen     Continuous Medications   Medication Dose Last Rate     Outpatient Medications:  Current Outpatient Medications   Medication Instructions    acetaminophen (TYLENOL) 1,000 mg, Every 6 hours PRN    calcium carbonate (TUMS EXTRA STRENGTH) 300 mg, 3 times daily PRN    clopidogrel (PLAVIX) 75 mg, oral, Daily    empagliflozin (JARDIANCE) 10 mg, oral, Daily, Last refill, appointment needed    ferrous sulfate (325 mg ferrous sulfate) 325 mg, Daily with breakfast    hydrALAZINE (APRESOLINE) 50 mg, oral, 3 times daily    levothyroxine (SYNTHROID, LEVOXYL) 175 mcg, oral, Daily    multivitamin tablet 1 tablet, Daily    NIFEdipine ER (ADALAT CC) 90 mg, oral, Daily    omeprazole (PRILOSEC) 20 mg, oral, Daily    potassium chloride CR (Klor-Con) 10 mEq ER tablet 10 mEq, oral, 2 times daily, Do not crush, chew, or split.    simvastatin (ZOCOR) 40 mg, oral, Daily       Physical Exam:  General: alert, oriented and in no acute distress  HEENT: NC/AT; EOMI; PERRLA, external ear is normal  Neck: supple; trachea midline; no masses; no JVD  Chest: clear breath sounds bilaterally; no wheezing  Cardio: regular rhythm, S1S2 normal, no murmurs  Abdomen: Soft,  non-tender, non-distension, no organomegaly  Extremities: no clubbing/cyanosis/edema  Neuro: Grossly intact     Psychiatric: Normal mood and affect      Assessment/Plan     Mr. Garcia Haskins is a 74 y.o. former smoker male being consulted by the Cardiology team for hypertension. Patient with prior medical history significant for CVA, Hypertension, Type 2 diabetes mellitus, Hyperlipidemia, Chronic kidney disease stage III, Hypothyroidism. He [facility] ED [facility] on [facility]/15/2024 complaining of generalized weakness for one week and persistent fatigue. He denied experiencing a sore throat, runny nose, sinus or nasal congestion, cough, fever, hemoptysis, shortness of breath, chest pain, palpitations, dizziness, worsening leg swelling, headache, or focal weakness. Recent lab work ordered by nephrology revealed abnormal electrolytes, including hypokalemia, hypomagnesemia, hypocalcemia, and acute kidney injury (GLENIS). The patient reports poor appetite and reduced oral intake but no significant weight loss. There is limited mobility and some functional decline. EKG shows normal sinus rhythm with no signs of acute ischemic changes. He has been admitted for clinical compensation.     Assessment    # Hypertension  - Crea 3.08.  - Elevated blood pressure.  - Would suggest to keep current medications including Nifedipine 90mg daily.  - Would suggest to discuss with Nephrology team starting him on ACE-I or ARB (Lisinopril or Losartan).  - Patient counseled to keep a healthy lifestyle including regular exercise and low-sodium diet.  - Recommended home blood pressure monitoring.  - Goal of BP < 130/80mmHg.     # Diabetes  - Counseled on healthy diet and regular exercises.  - Discussed need for weight loss and the benefits.   - Keep current medications including Empaglifozin.  - ISS.    # Hyperlipidemia  - Keep home medication with Simvastatin 40mg daily.  - Counseled on healthy diet and regular exercise.     #  Hypothyroidism  - Keep Levothyroxine 175mcg daily.    # CKD  - Crea 2.94 - 2.78 - 3.08.  - Hydration recs per Nephrology.  - Follow up with Nephrology team.    Thank you for allowing me to participate in the care of this patient. Please reach me out if you have any questions or if you need any clarifications regarding the patient's care.     Peripheral IV 12/13/24 20 G Right Wrist (Active)   Site Assessment Clean;Dry;Intact 12/16/24 0000   Dressing Type Transparent 12/16/24 0000   Line Status Saline locked 12/16/24 0000   Dressing Status Clean;Dry;Occlusive 12/16/24 0000   Number of days: 3     Code Status:  Full Code    Luc Westbrook MD  Cardiology

## 2024-12-16 NOTE — PROGRESS NOTES
Pt reviewed during Care Rounds today and he is not ready for discharge; ADOD is tomorrow/Wednesday. SW met with pt to review his plan.  Pt shares he has been feeling weak, but feels comfortable discharging home with HHC services. We reviewed SNF placement, but pt declines the same.  Provider of choice for HHC is [facility]. IM reviewed with no questions/concerns- copy added to pt's chart, another copy provided to pt. Care Transitions will continue to follow.     PATRICIO Weldon

## 2024-12-16 NOTE — CARE PLAN
The patient's goals for the shift include  BP within defined limits   Problem: Nutrition  Goal: Oral intake greater than 50%  Outcome: Progressing  Goal: Oral intake greater 75%  Outcome: Progressing  Goal: Consume prescribed supplement  Outcome: Progressing  Goal: Adequate PO fluid intake  Outcome: Progressing  Goal: Nutrition support goals are met within 48 hrs  Outcome: Progressing  Goal: Nutrition support is meeting 75% of nutrient needs  Outcome: Progressing  Goal: BG  mg/dL  Outcome: Progressing  Goal: Lab values WNL  Outcome: Progressing  Goal: Electrolytes WNL  Outcome: Progressing  Goal: Promote healing  Outcome: Progressing  Goal: Maintain stable weight  Outcome: Progressing  Goal: Gradual weight gain  Outcome: Progressing       The clinical goals for the shift include Decreased Sob today

## 2024-12-16 NOTE — PROGRESS NOTES
"Physical Therapy    Physical Therapy Treatment    Patient Name: Garcia Haskins  MRN: [id number]  Department: [facility]  Room: [address] [place] [address]  Today's Date: 12/16/2024  Time Calculation  Start Time: 1344  Stop Time: 1402  Time Calculation (min): 18 min         Assessment/Plan   Patient with significant impulsivity with mobility and high risk of falling.  He has poor awareness of O2 tubing to manage it with mobility.  Patient only wanting to do a little walking and agreeable to a few exercises.  States he will hyperventilate if he does more or if he lays down after session.  So patient wanting to sit EOB for a while and nurse notified.  Minutes later alarm going off as patient bending over while sitting to reach something that had fallen.  PT re-oriented patient to use of call light if he needs ANYTHING and reminded him of his fall risk.    PT Assessment  End of Session Communication: Bedside nurse  End of Session Patient Position:  (sitting on EOB, alarm on.  patient states he needs to sit up for a while or he will \"hyperventilate\" if he lays back down right now-nurse notiied of same)  PT Plan  Inpatient/Swing Bed or Outpatient: Inpatient  PT Plan  Treatment/Interventions: Bed mobility, Transfer training, Gait training, Balance training, Neuromuscular re-education, Strengthening, Stair training, Endurance training, Therapeutic exercise, Therapeutic activity, Home exercise program  PT Plan: Ongoing PT  PT Frequency: 4 times per week  PT Discharge Recommendations: Moderate intensity level of continued care, Low intensity level of continued care  Equipment Recommended upon Discharge: Wheeled walker  PT Recommended Transfer Status: Stand by assist, Contact guard  PT - OK to Discharge:  (once medically appropriate and safe DC plan in place)      General Visit Information:   PT  Visit  PT Received On: 12/16/24  General  Reason for Referral: Garcia Haskins is a 74 y.o. male presenting with generalized weakness for a week and tired " all the time.- hypocalcemia  Referred By: Kenton POST  Past Medical History Relevant to Rehab: Hx of GLENIS, hypokalemia, hypomagnesemia, hypocalcemia, CKDII, HTN, CVA, DM2, hyperlipidemia, hypothyroidism  Family/Caregiver Present: No  Prior to Session Communication: Bedside nurse  Patient Position Received: Bed, 2 rail up, Alarm off, not on at start of session  General Comment: patient agreeable to PT even though states he is tired; states he did get up for both meals    Subjective   Precautions:  Precautions  Medical Precautions: Fall precautions, Oxygen therapy device and L/min (3L)    Vital Signs (Past 2hrs)        Date/Time Vitals Session Patient Position Pulse Resp SpO2 BP MAP (mmHg)    12/16/24 1240 --  --  96  18  93 %  184/72  109     12/16/24 1344 --  --  --  --  94 %  --  --                         Objective   Pain:  Pain Assessment  Pain Assessment: 0-10  0-10 (Numeric) Pain Score: 0 - No pain  Cognition:  Cognition  Orientation Level: Oriented X4  Safety/Judgement: Exceptions to WFL  Insight: Mild  Impulsive: Severely  Flexibility of Thought: Reduced flexibility    Activity Tolerance:  Activity Tolerance  Endurance: Decreased tolerance for upright activites, Tolerates less than 10 min exercise, no significant change in vital signs  Treatments:  Therapeutic Exercise  Therapeutic Exercise Performed: Yes  Therapeutic Exercise Activity 1: standiing at FWW B heel raises x15  Therapeutic Exercise Activity 2: standing at FWW hip abd x12 ea  Therapeutic Exercise Activity 3: standing at FWW hip ext x12 ea         Bed Mobility  Bed Mobility: Yes  Bed Mobility 1  Bed Mobility 1: Supine to sitting  Level of Assistance 1: Independent    Ambulation/Gait Training  Ambulation/Gait Training Performed: Yes  Ambulation/Gait Training 1  Surface 1: Level tile  Device 1: Rolling walker  Gait Support Devices: Gait belt  Assistance 1: Moderate verbal cues, Contact guard, Minimum assistance (maxA to manage O2 tubing , patient with  poor awareness and frequently running over it/catching on walker wheels or entangling in feet)  Quality of Gait 1: Inconsistent stride length, Decreased step length  Comments/Distance (ft) 1: unsteady with turning at times, impulsive 60', 30';  did a little better with o2 management the second walk of 30'  Transfers  Transfer: Yes  Transfer 1  Technique 1: Sit to stand, Stand to sit  Transfer Device 1: Gait belt (FWW)  Transfer Level of Assistance 1: Contact guard, Close supervision, Moderate verbal cues  Trials/Comments 1: cues for safety, awareness of O2 tubing (frequently around his feet)  Transfers 2  Technique 2: Sit to stand  Transfer Device 2:  (no device or belt as he initially stood impulsively from the bed when PT was attending to getting device)  Transfer Level of Assistance 2: Close supervision    Outcome Measures:  [facility] Basic Mobility  Turning from your back to your side while in a flat bed without using bedrails: None  Moving from lying on your back to sitting on the side of a flat bed without using bedrails: None  Moving to and from bed to chair (including a wheelchair): A little  Standing up from a chair using your arms (e.g. wheelchair or bedside chair): A little  To walk in hospital room: A little  Climbing 3-5 steps with railing: Total  Basic Mobility - Total Score: 18    Education Documentation  Mobility Training, taught by Niki Childs, PT at 12/16/2024  2:14 PM.  Learner: Patient  Readiness: Acceptance  Method: Explanation, Demonstration  Response: Verbalizes Understanding, Demonstrated Understanding, Needs Reinforcement  Comment: safety with mobility, use of device; management of O2 tubing wiht moiblity;  slowing down with mobility to decrease fall risk ; pursed lip breathing    Education Comments  No comments found.        OP EDUCATION:       Encounter Problems       Encounter Problems (Active)       PT Goals       Bed Mobility (Progressing)       Start:  12/14/24    Expected End:   12/28/24       Patient will transfer supine<>sitting edge of bed without use of handrail independently to improve functional mobility toward baseline upon discharge.           Transfers (Progressing)       Start:  12/14/24    Expected End:  12/28/24       Patient will transfer sit<>stand with modified independence with use of least restrictive assistive device to improve functional mobility toward baseline upon discharge           Ambulation (Progressing)       Start:  12/14/24    Expected End:  12/28/24       Patient will ambulate 75 feet with use of least restrictive assistive device with SBA while demonstrating good safety and balance without significant changes in vital signs to improve functional mobility toward baseline upon discharge           Strength (Progressing)       Start:  12/14/24    Expected End:  12/28/24       Patient will demonstrate gross B LE musculature strength 5/5 MMT to improve stability with completion of transfers and ambulation toward baseline upon discharge              Pain - Adult

## 2024-12-16 NOTE — CONSULTS
"Nutrition Initial Assessment:   Nutrition Assessment    Reason for Assessment: Admission nursing screening    Patient is a 74 y.o. male presenting with hypokalemia, hypocalcemia, pneumonia, and GLENIS. PMH of CVA, HTN, T2 DM, HLD, CKD Stage III, hypoparathyroidism, and hypothyroidism. Per chart, pt has had poor appetite and poor oral intake, but no significant wt loss. Pt denies changes in appetite or poor oral intake. Did trial of Ensure Plus High Protein chocolate, pt is agreeable to taking this with BID.    Nutrition History:  Energy Intake: Good > 75 %  Food and Nutrient History: Pt reports \"I don't eat much much,\" and that this is normal for him. Intakes have been good  Food Allergies/Intolerances:  None  GI Symptoms: None  Oral Problems:  Pt reports swallowing issues \"when I'm tired\"     Anthropometrics:  Height: 175.3 cm (5' 9.02\")   Weight: 76.2 kg (167 lb 15.9 oz)   BMI (Calculated): 24.8  IBW/kg (Dietitian Calculated): 72.7 kg     Weight History:   Daily Weight  12/13/24 : 76.2 kg (167 lb 15.9 oz)  11/27/24 : 73.9 kg (163 lb)  08/20/24 : 72.1 kg (159 lb)  12/26/23 : 71.5 kg (157 lb 11.2 oz)  12/06/23 : 73.8 kg (162 lb 9.6 oz)  10/25/23 : 73.3 kg (161 lb 9.6 oz)  10/11/23 : 73 kg (160 lb 15 oz)  09/06/23 : 71.7 kg (158 lb)  06/26/23 : 71.3 kg (157 lb 3.2 oz)  05/22/23 : 72.6 kg (160 lb)     Weight Change %:  Weight History / % Weight Change: Per chart, pt wt appears to fluctuate. This is likely fluid-related.  Significant Weight Loss: No  Significant Weight Gain: Fluid related    Nutrition Focused Physical Exam Findings:  Subcutaneous Fat Loss:   Orbital Fat Pads: Mild-Moderate (slight dark circles and slight hollowing)  Buccal Fat Pads: Mild-Moderate (flat cheeks, minimal bounce)  Triceps: Defer  Ribs: Defer  Muscle Wasting:  Temporalis: Mild-Moderate (slight depression)  Pectoralis (Clavicular Region): Mild-Moderate (some protrusion of clavicle)  Deltoid/Trapezius: Defer  Muscle loss is likely age-related " sarcopenia.  Edema:  Edema: +2 mild    Nutrition Significant Labs:  CBC Trend:   Results from last 7 days   Lab Units 12/16/24  0429 12/15/24  0436 12/14/24 0427 12/13/24  1229   WBC AUTO x10*3/uL 12.9* 13.3* 12.5* 19.6*   RBC AUTO x10*6/uL 3.05* 2.94* 2.98* 3.25*   HEMOGLOBIN g/dL 7.7* 7.4* 7.6* 8.1*   HEMATOCRIT % 23.5* 22.4* 23.2* 24.6*   MCV fL 77* 76* 78* 76*   PLATELETS AUTO x10*3/uL 362 306 287 254   , Renal Lab Trend:   Results from last 7 days   Lab Units 12/16/24  0429 12/15/24  0436 12/14/24  0427 12/13/24  2112   POTASSIUM mmol/L 4.1 4.1 3.2* 3.7   PHOSPHORUS mg/dL  --   --  4.6  --    SODIUM mmol/L 129* 130* 132* 131*   MAGNESIUM mg/dL  --  1.79 1.83  --    EGFR mL/min/1.73m*2 21* 20* 23* 22*   BUN mg/dL 32* 31* 25* 25*   CREATININE mg/dL 3.01* 3.08* 2.78* 2.94*    , Iron Panel:   Lab Results   Component Value Date    IRON 35 12/14/2024    TIBC 211 (L) 12/14/2024    FERRITIN 268 12/14/2024        Nutrition Specific Medications:  Calcium carbonate, calcium gluconate, Jardiance, Lovenox, ferrous sulphate, levothyroxine, magnesium oxide, polyethylene glycol, potassium chloride.    I/O:   Last BM Date: 12/15/24; Stool Appearance: Loose (12/16/24 0800)  Intakes marked and all 100%    Dietary Orders (From admission, onward)       Start     Ordered    12/13/24 1547  Adult diet Cardiac; 70 gm fat; 2 - 3 grams Sodium  Diet effective now        Question Answer Comment   Diet type Cardiac    Fat restriction: 70 gm fat    Sodium restriction: 2 - 3 grams Sodium        12/13/24 1546    12/13/24 1540  May Participate in Room Service  ( ROOM SERVICE MAY PARTICIPATE)  Once        Question:  .  Answer:  Yes    12/13/24 1539                     Estimated Needs:   Total Energy Estimated Needs (kCal):  (0770-3676)  Method for Estimating Needs: 20-25 kcal/kg  Total Protein Estimated Needs (g):  (84-99)  Method for Estimating Needs: 1.1-1.3 g/kg  Total Fluid Estimated Needs (mL): 2286 mL      Nutrition Diagnosis    Malnutrition Diagnosis  Patient has Malnutrition Diagnosis: No    Nutrition Diagnosis  Patient has Nutrition Diagnosis: Yes  Diagnosis Status (1): New  Nutrition Diagnosis 1: Altered nutrition related to laboratory values  Related to (1): kidney and endocrine dysfunction  As Evidenced by (1): diagnosis of hypothryroidism, hypoparathyroidism, and stage 3a CKD       Nutrition Interventions/Recommendations       Nutrition Prescription:  Individualized Nutrition Prescription Provided for : 2181-5842 kcal and  kcal per day to be provided by diet and supplement regimen daily        Nutrition Interventions:   Interventions: Medical food supplement, Meals and snacks  Meals and Snacks: Fat-modified diet, Mineral-modified diet  Goal: Continue current diet  Medical Food Supplement: Commercial beverage  Goal: Ensure Plus High Protein BID with meals to provide an additional 700 kcal and 40 g protein per day      Nutrition Monitoring and Evaluation   Food/Nutrient Related History Monitoring  Monitoring and Evaluation Plan: Amount of food  Amount of Food: Estimated amout of food, Medical food intake  Criteria: >75% of trays consumed; ONS adherence    Body Composition/Growth/Weight History  Monitoring and Evaluation Plan: Weight  Weight: Measured weight, Weight change  Criteria: Wt maintenance    Biochemical Data, Medical Tests and Procedures  Monitoring and Evaluation Plan: Electrolyte/renal panel, Glucose/endocrine profile, Nutritional anemia profile  Electrolyte and Renal Panel: BUN, Calcium, serum, Chloride, Creatinine, Sodium, Potassium, Phosphorus  Criteria: Labs WNL  Glucose/Endocrine Profile: Hemoglobin A1c (HgbA1c), Glucose, fasting, Glucose, casual  Criteria: Labs WNL  Nutritional Anemia Profile: Hematocrit, Hemoglobin  Criteria: Labs WNL      Time Spent (min): 60 minutes

## 2024-12-16 NOTE — PROGRESS NOTES
"Garcia Haskins is a 74 y.o. male on day 3 of admission presenting with Hypokalemia.    Subjective     Generalized weakness slightly better  No nausea vomiting diarrhea  Slight trouble breathing  No chest pain  No significant cough sputum         Objective     Physical Exam  General Appearance: AAO x 3, fatigue tired   Skin: skin color pink, warm, and dry; no suspicious rashes or lesions  Eyes : PERRL, EOM's intact  ENT: mucous membranes pink and moist  Neck: normocephalic  Respiratory: Diminished at bases  Heart: regular rate and rhythm.   Abdomen: Nondistended, positive bowel sounds x4, soft,  nontender  Extremities: mild  edema   Peripheral pulses: normal x4 extremities  Neuro: alert, coherent and conversant, no focal motor deficits  Last Recorded Vitals  Blood pressure 161/73, pulse 89, temperature 36.6 °C (97.9 °F), temperature source Temporal, resp. rate 18, height 1.753 m (5' 9.02\"), weight 76.2 kg (167 lb 15.9 oz), SpO2 93%.  Intake/Output last 3 Shifts:  I/O last 3 completed shifts:  In: 1420 (18.6 mL/kg) [P.O.:1320; IV Piggyback:100]  Out: 720 (9.4 mL/kg) [Urine:720 (0.3 mL/kg/hr)]  Weight: 76.2 kg     Relevant Results  Scheduled medications  calcium carbonate, 1,000 mg, oral, TID  clopidogrel, 75 mg, oral, Daily  empagliflozin, 10 mg, oral, Daily  enoxaparin, 30 mg, subcutaneous, q24h  ferrous sulfate (325 mg ferrous sulfate), 65 mg of iron, oral, Daily  hydrALAZINE, 100 mg, oral, TID  ipratropium-albuteroL, 3 mL, nebulization, q4h  levothyroxine, 175 mcg, oral, Nightly  magnesium oxide, 400 mg, oral, Daily  NIFEdipine ER, 90 mg, oral, Daily  oral hydration, 25 mL, oral, q1h while awake  polyethylene glycol, 17 g, oral, Daily  potassium chloride CR, 10 mEq, oral, BID  sodium bicarbonate, 1,300 mg, oral, BID      Continuous medications     PRN medications  PRN medications: acetaminophen **OR** acetaminophen **OR** acetaminophen, acetaminophen **OR** acetaminophen **OR** acetaminophen, guaiFENesin, " ipratropium-albuteroL, metoprolol, morphine, ondansetron ODT **OR** ondansetron, oxygen    Results for orders placed or performed during the hospital encounter of 12/13/24 (from the past 24 hours)   CBC   Result Value Ref Range    WBC 12.9 (H) 4.4 - 11.3 x10*3/uL    nRBC 0.0 0.0 - 0.0 /100 WBCs    RBC 3.05 (L) 4.50 - 5.90 x10*6/uL    Hemoglobin 7.7 (L) 13.5 - 17.5 g/dL    Hematocrit 23.5 (L) 41.0 - 52.0 %    MCV 77 (L) 80 - 100 fL    MCH 25.2 (L) 26.0 - 34.0 pg    MCHC 32.8 32.0 - 36.0 g/dL    RDW 16.9 (H) 11.5 - 14.5 %    Platelets 362 150 - 450 x10*3/uL   Basic metabolic panel   Result Value Ref Range    Glucose 98 74 - 99 mg/dL    Sodium 129 (L) 136 - 145 mmol/L    Potassium 4.1 3.5 - 5.3 mmol/L    Chloride 97 (L) 98 - 107 mmol/L    Bicarbonate 19 (L) 21 - 32 mmol/L    Anion Gap 17 mmol/L    Urea Nitrogen 32 (H) 6 - 23 mg/dL    Creatinine 3.01 (H) 0.50 - 1.30 mg/dL    eGFR 21 (L) >60 mL/min/1.73m*2    Calcium 6.2 (L) 8.6 - 10.3 mg/dL       No results found.                            Assessment/Plan   Assessment & Plan  Hypokalemia      74-year-old male with history of  Chronic kidney disease stage III  Type 2 diabetes mellitus  CVA  Hypertension  Hyperlipidemia  Hypothyroidism  Neuropathy  Hyperparathyroidism  Presented with  Hypokalemia and hypomagnesemia and hypocalcemia  GLENIS  Right lower lobe pneumonia  Anemia of chronic kidney disease  Questionable Paget's disease  Peripheral edema with volume overload  Normal anion gap metabolic acidosis  Plan    PTH low  To follow endocrine outpatient  Replace electrolytes  Continue sodium bicarb tablets  Lasix dose IV  Follow renal function  Monitor on telemetry  Watch volume status  On ceftriaxone and azithromycin for pneumonia  Nebs treatments  Follow cultures  Lovenox for DVT prophylaxis  Jardiance, Plavix, nifedipine, hydralazine  Follow vitals  Iron supplement for anemia  Symptomatic management  Synthroid for hypothyroidism  To follow with hematology and orthopedic  outpatient  Tylenol for pain  Daily CBC BMP                           Shayan Fung MD

## 2024-12-17 ENCOUNTER — APPOINTMENT (OUTPATIENT)
Dept: NEPHROLOGY | Facility: CLINIC | Age: 74
End: 2024-12-17
Payer: MEDICARE

## 2024-12-17 LAB
1,25(OH)2D SERPL-MCNC: 23.5 PG/ML (ref 19.9–79.3)
ALBUMIN SERPL BCP-MCNC: 3.1 G/DL (ref 3.4–5)
ALP SERPL-CCNC: 302 U/L (ref 33–136)
ALT SERPL W P-5'-P-CCNC: 29 U/L (ref 10–52)
ANION GAP SERPL CALC-SCNC: 16 MMOL/L (ref 10–20)
AST SERPL W P-5'-P-CCNC: 36 U/L (ref 9–39)
BACTERIA BLD CULT: NORMAL
BACTERIA BLD CULT: NORMAL
BILIRUB SERPL-MCNC: 0.3 MG/DL (ref 0–1.2)
BUN SERPL-MCNC: 36 MG/DL (ref 6–23)
CALCIUM SERPL-MCNC: 6 MG/DL (ref 8.6–10.3)
CHLORIDE SERPL-SCNC: 96 MMOL/L (ref 98–107)
CO2 SERPL-SCNC: 20 MMOL/L (ref 21–32)
CREAT SERPL-MCNC: 3.1 MG/DL (ref 0.5–1.3)
EGFRCR SERPLBLD CKD-EPI 2021: 20 ML/MIN/1.73M*2
ERYTHROCYTE [DISTWIDTH] IN BLOOD BY AUTOMATED COUNT: 16.9 % (ref 11.5–14.5)
GLUCOSE SERPL-MCNC: 108 MG/DL (ref 74–99)
HCT VFR BLD AUTO: 23.6 % (ref 41–52)
HEMOCCULT SP1 STL QL: NEGATIVE
HGB BLD-MCNC: 7.7 G/DL (ref 13.5–17.5)
MCH RBC QN AUTO: 25.5 PG (ref 26–34)
MCHC RBC AUTO-ENTMCNC: 32.6 G/DL (ref 32–36)
MCV RBC AUTO: 78 FL (ref 80–100)
NRBC BLD-RTO: 0 /100 WBCS (ref 0–0)
PLATELET # BLD AUTO: 349 X10*3/UL (ref 150–450)
POTASSIUM SERPL-SCNC: 3.9 MMOL/L (ref 3.5–5.3)
PROT SERPL-MCNC: 6.4 G/DL (ref 6.4–8.2)
RBC # BLD AUTO: 3.02 X10*6/UL (ref 4.5–5.9)
SODIUM SERPL-SCNC: 128 MMOL/L (ref 136–145)
WBC # BLD AUTO: 11.2 X10*3/UL (ref 4.4–11.3)

## 2024-12-17 PROCEDURE — 94640 AIRWAY INHALATION TREATMENT: CPT

## 2024-12-17 PROCEDURE — 2500000004 HC RX 250 GENERAL PHARMACY W/ HCPCS (ALT 636 FOR OP/ED): Performed by: STUDENT IN AN ORGANIZED HEALTH CARE EDUCATION/TRAINING PROGRAM

## 2024-12-17 PROCEDURE — 2500000001 HC RX 250 WO HCPCS SELF ADMINISTERED DRUGS (ALT 637 FOR MEDICARE OP): Performed by: HOSPITALIST

## 2024-12-17 PROCEDURE — 85027 COMPLETE CBC AUTOMATED: CPT | Performed by: INTERNAL MEDICINE

## 2024-12-17 PROCEDURE — 94761 N-INVAS EAR/PLS OXIMETRY MLT: CPT

## 2024-12-17 PROCEDURE — 36415 COLL VENOUS BLD VENIPUNCTURE: CPT | Performed by: INTERNAL MEDICINE

## 2024-12-17 PROCEDURE — 2500000002 HC RX 250 W HCPCS SELF ADMINISTERED DRUGS (ALT 637 FOR MEDICARE OP, ALT 636 FOR OP/ED): Performed by: STUDENT IN AN ORGANIZED HEALTH CARE EDUCATION/TRAINING PROGRAM

## 2024-12-17 PROCEDURE — 2500000001 HC RX 250 WO HCPCS SELF ADMINISTERED DRUGS (ALT 637 FOR MEDICARE OP): Performed by: INTERNAL MEDICINE

## 2024-12-17 PROCEDURE — 99233 SBSQ HOSP IP/OBS HIGH 50: CPT | Performed by: INTERNAL MEDICINE

## 2024-12-17 PROCEDURE — 2500000004 HC RX 250 GENERAL PHARMACY W/ HCPCS (ALT 636 FOR OP/ED): Performed by: HOSPITALIST

## 2024-12-17 PROCEDURE — 80053 COMPREHEN METABOLIC PANEL: CPT | Performed by: INTERNAL MEDICINE

## 2024-12-17 PROCEDURE — 99233 SBSQ HOSP IP/OBS HIGH 50: CPT | Performed by: HOSPITALIST

## 2024-12-17 PROCEDURE — 2500000002 HC RX 250 W HCPCS SELF ADMINISTERED DRUGS (ALT 637 FOR MEDICARE OP, ALT 636 FOR OP/ED): Performed by: HOSPITALIST

## 2024-12-17 PROCEDURE — 2500000005 HC RX 250 GENERAL PHARMACY W/O HCPCS: Performed by: EMERGENCY MEDICINE

## 2024-12-17 PROCEDURE — 1100000001 HC PRIVATE ROOM DAILY

## 2024-12-17 ASSESSMENT — COGNITIVE AND FUNCTIONAL STATUS - GENERAL
MOBILITY SCORE: 20
DRESSING REGULAR LOWER BODY CLOTHING: A LITTLE
CLIMB 3 TO 5 STEPS WITH RAILING: A LOT
DRESSING REGULAR UPPER BODY CLOTHING: A LITTLE
MOVING TO AND FROM BED TO CHAIR: A LITTLE
MOBILITY SCORE: 22
DRESSING REGULAR LOWER BODY CLOTHING: A LITTLE
DAILY ACTIVITIY SCORE: 21
WALKING IN HOSPITAL ROOM: A LITTLE
DAILY ACTIVITIY SCORE: 20
HELP NEEDED FOR BATHING: A LITTLE
TOILETING: A LITTLE
CLIMB 3 TO 5 STEPS WITH RAILING: A LITTLE
TOILETING: A LITTLE
HELP NEEDED FOR BATHING: A LITTLE
WALKING IN HOSPITAL ROOM: A LITTLE

## 2024-12-17 ASSESSMENT — PAIN - FUNCTIONAL ASSESSMENT
PAIN_FUNCTIONAL_ASSESSMENT: 0-10
PAIN_FUNCTIONAL_ASSESSMENT: 0-10

## 2024-12-17 ASSESSMENT — PAIN SCALES - GENERAL
PAINLEVEL_OUTOF10: 0 - NO PAIN
PAINLEVEL_OUTOF10: 0 - NO PAIN

## 2024-12-17 NOTE — PROGRESS NOTES
Occupational Therapy                 Therapy Communication Note    Patient Name: Garcia Haskins  MRN: [id number]  Department: [facility]  Room: [address] [place] [address]  Today's Date: 12/17/2024     Discipline: Occupational Therapy    OT Missed Visit: Yes     Missed Visit Reason: Missed Visit Reason: Other (Comment), Patient refused (pt is asleep with lunch tray pushed to the side.  pt wakes easily but states that he was up all night and is too tired to participate in OT session.)

## 2024-12-17 NOTE — PROGRESS NOTES
"Cardiology Inpatient Progress Note  [facility] [place]    ASSESSMENT AND PLAN  Hypertension  -Serum creatinine 3.0 and GFR 20  -Continue nifedipine 90 mg daily and hydralazine 100 mg 3 times a day  -Nephrology was managing his hypertension as an outpatient so we will defer to them. My initial impression would be to start losartan 50 mg daily however it was stopped at his last outpatient nephrology visit.  I think it wise that nephrology manages given his acute kidney injury.  -Cardiology will sign off.     Objective:  Intake & Output  Net IO Since Admission: 5,433.33 mL [12/17/24 0813]    Today's Weight:  Vitals:    12/13/24 1540   Weight: 76.2 kg (167 lb 15.9 oz)       PHYSICAL EXAM  Physical Exam   Labs:     CMP:  Recent Labs     12/17/24  0427 12/16/24  0429 12/15/24  0436 12/14/24  0427 12/13/24  2112 12/13/24  1229 12/12/24  1119 10/10/23  1029 08/29/23  0931   * 129* 130* 132* 131* 134* 136   < > 135*   K 3.9 4.1 4.1 3.2* 3.7 3.0* 3.1*   < > 3.8   CL 96* 97* 99 101 98 100 100   < > 97*   CO2 20* 19* 19* 18* 21 24 23   < > 28   ANIONGAP 16 17 16 16 16 13 16   < > 14   BUN 36* 32* 31* 25* 25* 24* 24*   < > 40*   CREATININE 3.10* 3.01* 3.08* 2.78* 2.94* 2.72* 2.72*   < > 1.81*   EGFR 20* 21* 20* 23* 22* 24* 24*   < >  --    MG  --   --  1.79 1.83  --  1.46* 1.55*  --  2.10    < > = values in this interval not displayed.     Recent Labs     12/17/24  0427 12/15/24  0436 12/14/24  0427 12/13/24  1229 12/12/24  1119   ALBUMIN 3.1* 3.1* 3.0* 3.3* 3.2*   ALKPHOS 302* 292* 288* 323* 300*   ALT 29 24 27 31 28   AST 36 26 29 42* 32   BILITOT 0.3 0.2 0.3 0.4 0.3     CBC:  Recent Labs     12/17/24  0427 12/16/24  0429 12/15/24  0436 12/14/24 0427 12/13/24  1229   WBC 11.2 12.9* 13.3* 12.5* 19.6*   HGB 7.7* 7.7* 7.4* 7.6* 8.1*   HCT 23.6* 23.5* 22.4* 23.2* 24.6*    362 306 287 254   MCV 78* 77* 76* 78* 76*     COAG: No results for input(s): \"PTT\", \"INR\", \"HAUF\", " "\"DDIMERVTE\", \"HAPTOGLOBIN\", \"FIBRINOGEN\" in the last 03704 hours.  ABO: No results for input(s): \"ABO\" in the last 68951 hours.  HEME/ENDO:  Recent Labs     12/14/24  0427 08/22/24  1102 12/26/23  1129 12/05/23  0957 10/11/23  1441 07/20/23  1130 06/26/23  1038 06/26/23  1038 11/21/22  1356   FERRITIN 268  --   --   --  42  37 228  --  248  --    IRONSAT 17*  --   --  24* 9*  9* 15*   < > 28  --    TSH 2.33 0.75  --   --   --   --   --   --  0.54   HGBA1C  --  6.7* 7.7*  --   --   --   --  8.0* 8.1*    < > = values in this interval not displayed.      CARDIAC: No results for input(s): \"LDH\", \"CKMB\", \"TROPHS\", \"BNP\" in the last 80795 hours.    No lab exists for component: \"CK\", \"CKMBP\"  Recent Labs     06/26/23  1038   CHOL 148   LDLF 74   HDL 41.0   TRIG 163*       Inpatient Medications:    Current Facility-Administered Medications:     acetaminophen (Tylenol) tablet 650 mg, 650 mg, oral, q4h PRN **OR** acetaminophen (Tylenol) oral liquid 650 mg, 650 mg, nasogastric tube, q4h PRN **OR** acetaminophen (Tylenol) suppository 650 mg, 650 mg, rectal, q4h PRN, Shayan Fung MD    acetaminophen (Tylenol) tablet 650 mg, 650 mg, oral, q4h PRN **OR** acetaminophen (Tylenol) oral liquid 650 mg, 650 mg, oral, q4h PRN **OR** acetaminophen (Tylenol) suppository 650 mg, 650 mg, rectal, q4h PRN, Shayan Fung MD    calcium carbonate (Tums) chewable tablet 1,000 mg, 1,000 mg, oral, TID, Mauricio Rachel DO, 1,000 mg at 12/16/24 2150    clopidogrel (Plavix) tablet 75 mg, 75 mg, oral, Daily, Shayan Fung MD, 75 mg at 12/16/24 0921    empagliflozin (Jardiance) tablet 10 mg, 10 mg, oral, Daily, Shayan Fung MD, 10 mg at 12/16/24 0921    enoxaparin (Lovenox) syringe 30 mg, 30 mg, subcutaneous, q24h, Shayan Fung MD, 30 mg at 12/16/24 1602    ferrous sulfate (325 mg ferrous sulfate) tablet 325 mg, 65 mg of iron, oral, Daily, Shayan Fung MD, 325 mg at 12/16/24 0921    guaiFENesin (Mucinex) 12 hr tablet 600 mg, 600 mg, oral, BID PRN, " Shayan Fung MD    hydrALAZINE (Apresoline) tablet 100 mg, 100 mg, oral, TID, Shayan Fung MD, 100 mg at 12/16/24 2150    ipratropium-albuteroL (Duo-Neb) 0.5-2.5 mg/3 mL nebulizer solution 3 mL, 3 mL, nebulization, q4h, Clark Camacho DO, 3 mL at 12/17/24 0617    ipratropium-albuteroL (Duo-Neb) 0.5-2.5 mg/3 mL nebulizer solution 3 mL, 3 mL, nebulization, q2h PRN, Clark Camacho DO, 3 mL at 12/14/24 2101    levothyroxine (Synthroid, Levoxyl) tablet 175 mcg, 175 mcg, oral, Nightly, Shayan Fung MD, 175 mcg at 12/16/24 2150    magnesium oxide (Mag-Ox) tablet 400 mg, 400 mg, oral, Daily, Shayan Fung MD, 400 mg at 12/16/24 0921    metoprolol tartrate (Lopressor) injection 5 mg, 5 mg, intravenous, q6h PRN, Sofya Childs MD, 5 mg at 12/16/24 0127    morphine injection 1 mg, 1 mg, intravenous, q4h PRN, Sofya Childs MD, 1 mg at 12/16/24 2320    NIFEdipine ER (Adalat CC) 24 hr tablet 90 mg, 90 mg, oral, Daily, Shayan Fung MD, 90 mg at 12/16/24 0920    ondansetron ODT (Zofran-ODT) disintegrating tablet 4 mg, 4 mg, oral, q8h PRN **OR** ondansetron (Zofran) injection 4 mg, 4 mg, intravenous, q8h PRN, Shayan Fung MD    oral hydration solution 25 mL, 25 mL, oral, q1h while awake, Shayan Fung MD, 25 mL at 12/16/24 2200    oxygen (O2) therapy, , inhalation, Continuous PRN - O2/gases, Adis Haskins, , 3 L/min at 12/17/24 0617    polyethylene glycol (Glycolax, Miralax) packet 17 g, 17 g, oral, Daily, Shayan Fung MD    potassium chloride CR (Klor-Con) ER tablet 10 mEq, 10 mEq, oral, BID, Shayan Fung MD, 10 mEq at 12/16/24 2150    sodium bicarbonate tablet 1,300 mg, 1,300 mg, oral, BID, Mauricio Rachel DO, 1,300 mg at 12/16/24 2150     VITALS  Vitals:    12/17/24 0707   BP: 179/55   Pulse: 99   Resp: 20   Temp: 36.7 °C (98.1 °F)   SpO2: 95%       Thank you for this interesting clinical case and allowing me to participate in the care of this patient.  Please reach me out if you have any questions or if you  need any clarifications regarding the patient's care.    **Disclaimer: This note was dictated by speech recognition, and every effort has been made to prevent any error in transcription, however minor errors may be present**  _________________________________________________________  Chris Gaitan, MSN, CNP, ACNPC, CCRN  Advanced Practice Provider, Nurse Practitioner  Division of Cardiovascular Medicine  [facility] [place]  [facility]

## 2024-12-17 NOTE — PROGRESS NOTES
Physical Therapy                 Therapy Communication Note    Patient Name: Garcia Haskins  MRN: [id number]  Department: [facility]  Room: [address] [place], [place] [address]  Today's Date: 12/17/2024     Discipline: Physical Therapy    PT Missed Visit: Yes     Missed Visit Reason: Missed Visit Reason: Other (Comment) (pt is asleep with lunch tray pushed to the side. pt wakes easily but states that he was up all night and is too tired to particpate in PT session.)    Missed Time: Attempt

## 2024-12-17 NOTE — PROGRESS NOTES
Garcia Haskins is a 74 y.o. male on day 4 of admission presenting with Hypokalemia.      Subjective   Patient seen and examined at the bedside  He is resting fairly comfortably in bed  Overall still not feeling great  Is fatigued and tired         Objective          Vitals 24HR  Heart Rate:  []   Temp:  [36.6 °C (97.9 °F)-36.7 °C (98.1 °F)]   Resp:  [20-24]   BP: (139-179)/(55-73)   SpO2:  [93 %-98 %]         Intake/Output last 3 Shifts:    Intake/Output Summary (Last 24 hours) at 12/17/2024 1416  Last data filed at 12/17/2024 0914  Gross per 24 hour   Intake 845 ml   Output 600 ml   Net 245 ml       Physical Exam  Constitutional:       Appearance: Normal appearance.   HENT:      Head: Normocephalic and atraumatic.      Right Ear: External ear normal.      Left Ear: External ear normal.      Nose: Nose normal.      Mouth/Throat:      Mouth: Mucous membranes are moist.      Pharynx: Oropharynx is clear.   Eyes:      Extraocular Movements: Extraocular movements intact.      Conjunctiva/sclera: Conjunctivae normal.      Pupils: Pupils are equal, round, and reactive to light.   Cardiovascular:      Rate and Rhythm: Normal rate and regular rhythm.   Pulmonary:      Effort: Pulmonary effort is normal.      Breath sounds: Normal breath sounds.   Abdominal:      General: Abdomen is flat.      Palpations: Abdomen is soft.   Musculoskeletal:         General: Swelling present.      Right lower leg: Edema present.      Left lower leg: Edema present.   Skin:     General: Skin is warm and dry.   Neurological:      General: No focal deficit present.      Mental Status: He is alert and oriented to person, place, and time.   Psychiatric:         Mood and Affect: Mood normal.         Behavior: Behavior normal.     Scheduled medications  calcium carbonate, 1,000 mg, oral, TID  clopidogrel, 75 mg, oral, Daily  empagliflozin, 10 mg, oral, Daily  enoxaparin, 30 mg, subcutaneous, q24h  ferrous sulfate (325 mg ferrous sulfate), 65  mg of iron, oral, Daily  hydrALAZINE, 100 mg, oral, TID  ipratropium-albuteroL, 3 mL, nebulization, q4h  levothyroxine, 175 mcg, oral, Nightly  magnesium oxide, 400 mg, oral, Daily  NIFEdipine ER, 90 mg, oral, Daily  polyethylene glycol, 17 g, oral, Daily  potassium chloride CR, 10 mEq, oral, BID  sodium bicarbonate, 1,300 mg, oral, BID      Continuous medications     PRN medications  PRN medications: acetaminophen **OR** acetaminophen **OR** acetaminophen, acetaminophen **OR** acetaminophen **OR** acetaminophen, guaiFENesin, ipratropium-albuteroL, metoprolol, morphine, ondansetron ODT **OR** ondansetron, oxygen      Relevant Results               Assessment/Plan              Assessment & Plan  Hypokalemia    Acute renal failure: Likely intrinsic: ATN  Severe hypocalcemia: Improving  CKD 3 with baseline creatinine about 1.8  Right renal cyst  Atrophic right kidney  Anemia of chronic disease  Type 2 diabetes  Microalbuminuria  Hypertension: Resistant  Hyperlipidemia  Peripheral edema with volume overload and anasarca  Normal anion gap metabolic acidosis  Hyponatremia    Plan:  At this time his overall prognosis and progress is guarded.  He has severe hypocalcemia that is likely secondary to a rare cause of hypoparathyroidism.  He is not wasting calcium through his urine  His urine calcium in 24 hours was less than 1  He is becoming more and more anasarcic.  He has swelling all the way up to his waist now and loop diuretics are problematic with making calcium loss through the urine  Renal function is not improving  From the nephrology standpoint he may need renal replacement therapy just for volume overload as we do not have any good options for him  He is hyponatremic with hypervolemia  For now we will continue supportive measures  May benefit from and endocrinology consult             Mauricio Rachel, DO

## 2024-12-17 NOTE — PROGRESS NOTES
Pt reviewed during Care Rounds today and he is not ready for discharge; ADOD is tomorrow. SW met with pt to review his plan. Pt confirms his desires to discharge home with [facility].  Still feels weak, but declines SNF placement. Skilled stay would likely be denied anyways, as pt was able to ambulate 60 feet yesterday and has an AMPAC score of 18/24.  Care Transitions will continue to follow.       PATRICIO Weldon

## 2024-12-17 NOTE — CARE PLAN
Avoid  alcohol  Low-Salt Diet  This diet removes foods that are high in salt. It also limits the amount of salt you use when cooking. It is most often used for people with high blood pressure, edema (fluid retention), and kidney, liver, or heart disease.  Table salt contains the mineral sodium. Your body needs sodium to work normally. But too much sodium can make your health problems worse. Your healthcare provider is recommending a low-salt (also called low-sodium) diet for you. Your total daily allowance of salt is 1,500 to 2,300 milligrams (mg). It is less than 1 teaspoon of table salt. This means you can have only about 500 to 700 mg of sodium at each meal. People with certain health problems should limit salt intake to the lower end of the recommended range.    When you cook, don´t add much salt. If you can cook without using salt, even better. Don´t add salt to your food at the table.  When shopping, read food labels. Salt is often called sodium on the label. Choose foods that are salt-free, low salt, or very low salt. Note that foods with reduced salt may not lower your salt intake enough.    Beans, potatoes, and pasta  Ok: Dry beans, split peas, lentils, potatoes, rice, macaroni, pasta, spaghetti without added salt  Avoid: Potato chips, tortilla chips, and similar products  Breads and cereals  Ok: Low-sodium breads, rolls, cereals, and cakes; low-salt crackers, matzo crackers  Avoid: Salted crackers, pretzels, popcorn, Citizen of [place] toast, pancakes, muffins  Dairy  Ok: Milk, chocolate milk, hot chocolate mix, low-salt cheeses, and yogurt  Avoid: Processed cheese and cheese spreads; Roquef[place], Camembert, and cottage cheese; buttermilk, instant breakfast drink  Desserts  Ok: Ice cream, frozen yogurt, juice bars, gelatin, cookies and pies, sugar, honey, jelly, hard candy  Avoid: Most pies, cakes and cookies prepared or processed with salt; instant pudding  Drinks  Ok: Tea, coffee, fizzy (carbonated) drinks,    Problem: Nutrition  Goal: Oral intake greater 75%  Outcome: Progressing  Goal: Consume prescribed supplement  Outcome: Progressing  Goal: Adequate PO fluid intake  Outcome: Progressing  Goal: BG  mg/dL  Outcome: Progressing  Goal: Lab values WNL  Outcome: Progressing  Goal: Electrolytes WNL  Outcome: Progressing  Goal: Maintain stable weight  Outcome: Progressing     Problem: Diabetes  Goal: Achieve decreasing blood glucose levels by end of shift  Outcome: Progressing  Goal: Increase stability of blood glucose readings by end of shift  Outcome: Progressing  Goal: Decrease in ketones present in urine by end of shift  Outcome: Progressing  Goal: Maintain electrolyte levels within acceptable range throughout shift  Outcome: Progressing  Goal: Maintain glucose levels >70mg/dl to <250mg/dl throughout shift  Outcome: Progressing  Goal: No changes in neurological exam by end of shift  Outcome: Progressing  Goal: Learn about and adhere to nutrition recommendations by end of shift  Outcome: Progressing  Goal: Vital signs within normal range for age by end of shift  Outcome: Progressing  Goal: Increase self care and/or family involovement by end of shift  Outcome: Progressing  Goal: Receive DSME education by end of shift  Outcome: Progressing     Problem: Skin  Goal: Participates in plan/prevention/treatment measures  Outcome: Progressing  Flowsheets (Taken 12/17/2024 0759)  Participates in plan/prevention/treatment measures: Increase activity/out of bed for meals  Goal: Prevent/manage excess moisture  Outcome: Progressing  Flowsheets (Taken 12/17/2024 0759)  Prevent/manage excess moisture: Moisturize dry skin  Goal: Prevent/minimize sheer/friction injuries  Outcome: Progressing  Flowsheets (Taken 12/17/2024 0759)  Prevent/minimize sheer/friction injuries: Use pull sheet  Goal: Promote/optimize nutrition  Outcome: Progressing  Flowsheets (Taken 12/17/2024 0759)  Promote/optimize nutrition: Consume > 50%  juices  Avoid: Flavored coffees, electrolyte replacement drinks, sports drinks  Meats  Ok: All fresh meat, fish, poultry, low-salt tuna, eggs, egg substitute  Avoid: Smoked, pickled, brine-cured, or salted meats and fish. This includes noble, chipped beef, corned beef, hot dogs, deli meats, ham, kosher meats, salt pork, sausage, canned tuna, salted codfish, smoked salmon, herring, sardines, or anchovies.  Seasonings and spices  Ok: Most seasonings are okay. Good substitutes for salt include: fresh herb blends, hot sauce, lemon, garlic, maxwell, vinegar, dry mustard, parsley, cilantro, horseradish, tomato paste, regular margarine, mayonnaise, unsalted butter, cream cheese, vegetable oil, cream, low-salt salad dressing and gravy.  Avoid: Regular ketchup, relishes, pickles, soy sauce, teriyaki sauce, Worcestershire sauce, BBQ sauce, tartar sauce, meat tenderizer, chili sauce, regular gravy, regular salad dressing, salted butter  Soups  Ok: Low-salt soups and broths made with allowed foods  Avoid: Bouillon cubes, soups with smoked or salted meats, regular soup and broth  Vegetables  Ok: Most vegetables are okay; also low-salt tomato and vegetable juices  Avoid: Sauerkraut and other brine-soaked vegetables; pickles and other pickled vegetables; tomato juice, olives  © 2855-8921 [employer]. [address] [place] [address]. All rights reserved. This information is not intended as a substitute for professional medical care. Always follow your healthcare professional's instructions.      Thank you for choosing [facility].     Please fill out the patient experience survey.   meals/supplements     Problem: Pain - Adult  Goal: Verbalizes/displays adequate comfort level or baseline comfort level  Outcome: Progressing     Problem: Safety - Adult  Goal: Free from fall injury  Outcome: Progressing     Problem: Discharge Planning  Goal: Discharge to home or other facility with appropriate resources  Outcome: Progressing     Problem: Chronic Conditions and Co-morbidities  Goal: Patient's chronic conditions and co-morbidity symptoms are monitored and maintained or improved  Outcome: Progressing     Problem: Fall/Injury  Goal: Not fall by end of shift  Outcome: Progressing  Goal: Be free from injury by end of the shift  Outcome: Progressing  Goal: Verbalize understanding of personal risk factors for fall in the hospital  Outcome: Progressing  Goal: Verbalize understanding of risk factor reduction measures to prevent injury from fall in the home  Outcome: Progressing  Goal: Use assistive devices by end of the shift  Outcome: Progressing  Goal: Pace activities to prevent fatigue by end of the shift  Outcome: Progressing   The patient's goals for the shift include      The clinical goals for the shift include Decreased Sob today    Over the shift, the patient did not make progress toward the following goals. Barriers to progression include . Recommendations to address these barriers include .

## 2024-12-18 LAB
ALBUMIN SERPL BCP-MCNC: 3 G/DL (ref 3.4–5)
ALP SERPL-CCNC: 295 U/L (ref 33–136)
ALT SERPL W P-5'-P-CCNC: 30 U/L (ref 10–52)
ANION GAP SERPL CALC-SCNC: 14 MMOL/L (ref 10–20)
AST SERPL W P-5'-P-CCNC: 37 U/L (ref 9–39)
BILIRUB SERPL-MCNC: 0.2 MG/DL (ref 0–1.2)
BUN SERPL-MCNC: 36 MG/DL (ref 6–23)
CALCIUM SERPL-MCNC: 6 MG/DL (ref 8.6–10.3)
CHLORIDE SERPL-SCNC: 96 MMOL/L (ref 98–107)
CO2 SERPL-SCNC: 22 MMOL/L (ref 21–32)
CREAT SERPL-MCNC: 2.94 MG/DL (ref 0.5–1.3)
EGFRCR SERPLBLD CKD-EPI 2021: 22 ML/MIN/1.73M*2
ERYTHROCYTE [DISTWIDTH] IN BLOOD BY AUTOMATED COUNT: 16.7 % (ref 11.5–14.5)
GLUCOSE SERPL-MCNC: 87 MG/DL (ref 74–99)
HCT VFR BLD AUTO: 23 % (ref 41–52)
HGB BLD-MCNC: 7.5 G/DL (ref 13.5–17.5)
MCH RBC QN AUTO: 25.4 PG (ref 26–34)
MCHC RBC AUTO-ENTMCNC: 32.6 G/DL (ref 32–36)
MCV RBC AUTO: 78 FL (ref 80–100)
NRBC BLD-RTO: 0 /100 WBCS (ref 0–0)
PLATELET # BLD AUTO: 323 X10*3/UL (ref 150–450)
POTASSIUM SERPL-SCNC: 4 MMOL/L (ref 3.5–5.3)
PROT SERPL-MCNC: 6 G/DL (ref 6.4–8.2)
RBC # BLD AUTO: 2.95 X10*6/UL (ref 4.5–5.9)
SODIUM SERPL-SCNC: 128 MMOL/L (ref 136–145)
WBC # BLD AUTO: 9.2 X10*3/UL (ref 4.4–11.3)

## 2024-12-18 PROCEDURE — 2500000001 HC RX 250 WO HCPCS SELF ADMINISTERED DRUGS (ALT 637 FOR MEDICARE OP)

## 2024-12-18 PROCEDURE — 80053 COMPREHEN METABOLIC PANEL: CPT | Performed by: INTERNAL MEDICINE

## 2024-12-18 PROCEDURE — 1100000001 HC PRIVATE ROOM DAILY

## 2024-12-18 PROCEDURE — 99233 SBSQ HOSP IP/OBS HIGH 50: CPT | Performed by: STUDENT IN AN ORGANIZED HEALTH CARE EDUCATION/TRAINING PROGRAM

## 2024-12-18 PROCEDURE — 2500000002 HC RX 250 W HCPCS SELF ADMINISTERED DRUGS (ALT 637 FOR MEDICARE OP, ALT 636 FOR OP/ED): Performed by: HOSPITALIST

## 2024-12-18 PROCEDURE — 94640 AIRWAY INHALATION TREATMENT: CPT

## 2024-12-18 PROCEDURE — 2500000004 HC RX 250 GENERAL PHARMACY W/ HCPCS (ALT 636 FOR OP/ED): Performed by: HOSPITALIST

## 2024-12-18 PROCEDURE — 2500000001 HC RX 250 WO HCPCS SELF ADMINISTERED DRUGS (ALT 637 FOR MEDICARE OP): Performed by: INTERNAL MEDICINE

## 2024-12-18 PROCEDURE — 99233 SBSQ HOSP IP/OBS HIGH 50: CPT | Performed by: INTERNAL MEDICINE

## 2024-12-18 PROCEDURE — 2500000004 HC RX 250 GENERAL PHARMACY W/ HCPCS (ALT 636 FOR OP/ED): Performed by: STUDENT IN AN ORGANIZED HEALTH CARE EDUCATION/TRAINING PROGRAM

## 2024-12-18 PROCEDURE — 2500000004 HC RX 250 GENERAL PHARMACY W/ HCPCS (ALT 636 FOR OP/ED): Performed by: INTERNAL MEDICINE

## 2024-12-18 PROCEDURE — 2500000002 HC RX 250 W HCPCS SELF ADMINISTERED DRUGS (ALT 637 FOR MEDICARE OP, ALT 636 FOR OP/ED): Performed by: STUDENT IN AN ORGANIZED HEALTH CARE EDUCATION/TRAINING PROGRAM

## 2024-12-18 PROCEDURE — 97530 THERAPEUTIC ACTIVITIES: CPT | Mod: GP | Performed by: PHYSICAL THERAPIST

## 2024-12-18 PROCEDURE — 94761 N-INVAS EAR/PLS OXIMETRY MLT: CPT

## 2024-12-18 PROCEDURE — 36415 COLL VENOUS BLD VENIPUNCTURE: CPT | Performed by: INTERNAL MEDICINE

## 2024-12-18 PROCEDURE — 2500000001 HC RX 250 WO HCPCS SELF ADMINISTERED DRUGS (ALT 637 FOR MEDICARE OP): Performed by: STUDENT IN AN ORGANIZED HEALTH CARE EDUCATION/TRAINING PROGRAM

## 2024-12-18 PROCEDURE — 85027 COMPLETE CBC AUTOMATED: CPT | Performed by: INTERNAL MEDICINE

## 2024-12-18 PROCEDURE — 2500000001 HC RX 250 WO HCPCS SELF ADMINISTERED DRUGS (ALT 637 FOR MEDICARE OP): Performed by: HOSPITALIST

## 2024-12-18 PROCEDURE — 97530 THERAPEUTIC ACTIVITIES: CPT | Mod: GO

## 2024-12-18 PROCEDURE — 2500000005 HC RX 250 GENERAL PHARMACY W/O HCPCS: Performed by: EMERGENCY MEDICINE

## 2024-12-18 RX ORDER — FUROSEMIDE 10 MG/ML
40 INJECTION INTRAMUSCULAR; INTRAVENOUS EVERY 12 HOURS
Status: DISCONTINUED | OUTPATIENT
Start: 2024-12-18 | End: 2024-12-20

## 2024-12-18 RX ORDER — CALCITRIOL 0.25 UG/1
0.5 CAPSULE ORAL 2 TIMES DAILY
Status: DISCONTINUED | OUTPATIENT
Start: 2024-12-18 | End: 2024-12-20 | Stop reason: HOSPADM

## 2024-12-18 ASSESSMENT — PAIN - FUNCTIONAL ASSESSMENT
PAIN_FUNCTIONAL_ASSESSMENT: 0-10

## 2024-12-18 ASSESSMENT — COGNITIVE AND FUNCTIONAL STATUS - GENERAL
TOILETING: A LITTLE
WALKING IN HOSPITAL ROOM: A LOT
CLIMB 3 TO 5 STEPS WITH RAILING: A LOT
CLIMB 3 TO 5 STEPS WITH RAILING: TOTAL
TURNING FROM BACK TO SIDE WHILE IN FLAT BAD: A LITTLE
HELP NEEDED FOR BATHING: A LOT
EATING MEALS: A LITTLE
DRESSING REGULAR LOWER BODY CLOTHING: A LOT
PERSONAL GROOMING: A LITTLE
MOVING TO AND FROM BED TO CHAIR: A LITTLE
TOILETING: A LITTLE
MOBILITY SCORE: 14
STANDING UP FROM CHAIR USING ARMS: A LOT
PERSONAL GROOMING: A LITTLE
DRESSING REGULAR UPPER BODY CLOTHING: A LITTLE
STANDING UP FROM CHAIR USING ARMS: A LITTLE
DAILY ACTIVITIY SCORE: 16
HELP NEEDED FOR BATHING: A LITTLE
MOBILITY SCORE: 19
DRESSING REGULAR LOWER BODY CLOTHING: A LITTLE
WALKING IN HOSPITAL ROOM: A LITTLE
DAILY ACTIVITIY SCORE: 20
MOVING TO AND FROM BED TO CHAIR: A LOT

## 2024-12-18 ASSESSMENT — PAIN SCALES - GENERAL
PAINLEVEL_OUTOF10: 0 - NO PAIN

## 2024-12-18 NOTE — PROGRESS NOTES
"Garcia Haksins is a 74 y.o. male on day 4 of admission presenting with Hypokalemia.    Subjective   Generalized weakness tired and fatigued  Slight shortness of breath at rest  Occasional cough       Objective     Physical Exam  General Appearance: AAO x 3, fatigue tired   Skin: skin color pink, warm, and dry; no suspicious rashes or lesions  Eyes : PERRL, EOM's intact  ENT: mucous membranes pink and moist  Neck: normocephalic  Respiratory: Diminished at bases  Heart: regular rate and rhythm.   Abdomen: Nondistended, positive bowel sounds x4, soft,  nontender  Extremities:  edema   Peripheral pulses: normal x4 extremities  Neuro: alert, coherent and conversant, no focal motor deficits  Last Recorded Vitals  Blood pressure 170/70, pulse 93, temperature 36.7 °C (98.1 °F), temperature source Temporal, resp. rate 18, height 1.753 m (5' 9.02\"), weight 76.2 kg (167 lb 15.9 oz), SpO2 94%.  Intake/Output last 3 Shifts:  I/O last 3 completed shifts:  In: 1205 (15.8 mL/kg) [P.O.:1205]  Out: 600 (7.9 mL/kg) [Urine:600 (0.2 mL/kg/hr)]  Weight: 76.2 kg     Relevant Results    Scheduled medications  calcium carbonate, 1,000 mg, oral, TID  clopidogrel, 75 mg, oral, Daily  empagliflozin, 10 mg, oral, Daily  enoxaparin, 30 mg, subcutaneous, q24h  ferrous sulfate (325 mg ferrous sulfate), 65 mg of iron, oral, Daily  hydrALAZINE, 100 mg, oral, TID  ipratropium-albuteroL, 3 mL, nebulization, q4h  levothyroxine, 175 mcg, oral, Nightly  magnesium oxide, 400 mg, oral, Daily  NIFEdipine ER, 90 mg, oral, Daily  polyethylene glycol, 17 g, oral, Daily  potassium chloride CR, 10 mEq, oral, BID  sodium bicarbonate, 1,300 mg, oral, BID      Continuous medications     PRN medications  PRN medications: acetaminophen **OR** acetaminophen **OR** acetaminophen, acetaminophen **OR** acetaminophen **OR** acetaminophen, guaiFENesin, ipratropium-albuteroL, metoprolol, morphine, ondansetron ODT **OR** ondansetron, oxygen  Results for orders placed or " performed during the hospital encounter of 12/13/24 (from the past 24 hours)   Occult Blood, Stool    Specimen: Stool   Result Value Ref Range    Occult Blood, Stool X1 Negative Negative   CBC   Result Value Ref Range    WBC 11.2 4.4 - 11.3 x10*3/uL    nRBC 0.0 0.0 - 0.0 /100 WBCs    RBC 3.02 (L) 4.50 - 5.90 x10*6/uL    Hemoglobin 7.7 (L) 13.5 - 17.5 g/dL    Hematocrit 23.6 (L) 41.0 - 52.0 %    MCV 78 (L) 80 - 100 fL    MCH 25.5 (L) 26.0 - 34.0 pg    MCHC 32.6 32.0 - 36.0 g/dL    RDW 16.9 (H) 11.5 - 14.5 %    Platelets 349 150 - 450 x10*3/uL   Comprehensive Metabolic Panel   Result Value Ref Range    Glucose 108 (H) 74 - 99 mg/dL    Sodium 128 (L) 136 - 145 mmol/L    Potassium 3.9 3.5 - 5.3 mmol/L    Chloride 96 (L) 98 - 107 mmol/L    Bicarbonate 20 (L) 21 - 32 mmol/L    Anion Gap 16 10 - 20 mmol/L    Urea Nitrogen 36 (H) 6 - 23 mg/dL    Creatinine 3.10 (H) 0.50 - 1.30 mg/dL    eGFR 20 (L) >60 mL/min/1.73m*2    Calcium 6.0 (L) 8.6 - 10.3 mg/dL    Albumin 3.1 (L) 3.4 - 5.0 g/dL    Alkaline Phosphatase 302 (H) 33 - 136 U/L    Total Protein 6.4 6.4 - 8.2 g/dL    AST 36 9 - 39 U/L    Bilirubin, Total 0.3 0.0 - 1.2 mg/dL    ALT 29 10 - 52 U/L                          Assessment/Plan   Assessment & Plan  Hypokalemia        74-year-old male with history of  Chronic kidney disease stage III  Type 2 diabetes mellitus  CVA  Hypertension  Hyperlipidemia  Hypothyroidism  Neuropathy  Hyperparathyroidism  Presented with  Hypokalemia, hypomagnesemia, hypocalcemia  GLENIS  Right lower lobe pneumonia  Anemia of chronic kidney disease  Questionable Paget's disease  Peripheral edema with volume overload  Normal anion gap metabolic acidosis  Hyponatremia  Plan  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

## 2024-12-18 NOTE — PROGRESS NOTES
Garcia Haskins is a 74 y.o. male on day 5 of admission presenting with Hypokalemia.    Subjective   Patient seen and examined at the bedside this morning  He is sitting comfortably in bedside chair  His swelling persists  Still states he is short of breath         Objective          Vitals 24HR  Heart Rate:  [84-99]   Temp:  [36.5 °C (97.7 °F)-37.2 °C (99 °F)]   Resp:  [18-20]   BP: (150-207)/(65-81)   SpO2:  [86 %-96 %]         Intake/Output last 3 Shifts:    Intake/Output Summary (Last 24 hours) at 12/18/2024 1227  Last data filed at 12/18/2024 0922  Gross per 24 hour   Intake 360 ml   Output 225 ml   Net 135 ml       Physical Exam  Constitutional:       Appearance: Normal appearance.   HENT:      Head: Normocephalic and atraumatic.      Right Ear: External ear normal.      Left Ear: External ear normal.      Nose: Nose normal.      Mouth/Throat:      Mouth: Mucous membranes are moist.      Pharynx: Oropharynx is clear.   Eyes:      Extraocular Movements: Extraocular movements intact.      Conjunctiva/sclera: Conjunctivae normal.      Pupils: Pupils are equal, round, and reactive to light.   Cardiovascular:      Rate and Rhythm: Normal rate and regular rhythm.   Pulmonary:      Effort: Pulmonary effort is normal.      Breath sounds: Normal breath sounds.   Abdominal:      General: Abdomen is flat.      Palpations: Abdomen is soft.   Musculoskeletal:         General: Swelling present.      Right lower leg: Edema present.      Left lower leg: Edema present.   Skin:     General: Skin is warm and dry.   Neurological:      General: No focal deficit present.      Mental Status: He is alert and oriented to person, place, and time.   Psychiatric:         Mood and Affect: Mood normal.         Behavior: Behavior normal.     Scheduled medications  calcitriol, 0.5 mcg, oral, BID  calcium carbonate, 1,000 mg, oral, TID  clopidogrel, 75 mg, oral, Daily  empagliflozin, 10 mg, oral, Daily  enoxaparin, 30 mg, subcutaneous,  q24h  ferrous sulfate (325 mg ferrous sulfate), 65 mg of iron, oral, Daily  hydrALAZINE, 100 mg, oral, TID  ipratropium-albuteroL, 3 mL, nebulization, q4h  levothyroxine, 175 mcg, oral, Nightly  magnesium oxide, 400 mg, oral, Daily  NIFEdipine ER, 90 mg, oral, Daily  polyethylene glycol, 17 g, oral, Daily  potassium chloride CR, 10 mEq, oral, BID  sodium bicarbonate, 1,300 mg, oral, BID      Continuous medications     PRN medications  PRN medications: acetaminophen **OR** acetaminophen **OR** acetaminophen, acetaminophen **OR** acetaminophen **OR** acetaminophen, guaiFENesin, ipratropium-albuteroL, metoprolol, morphine, ondansetron ODT **OR** ondansetron, oxygen, phenoL, sodium chloride      Relevant Results               Assessment/Plan              Assessment & Plan  Hypokalemia    Acute renal failure: Likely intrinsic: ATN  Severe hypocalcemia: Improving  CKD 3 with baseline creatinine about 1.8  Right renal cyst  Atrophic right kidney  Anemia of chronic disease  Type 2 diabetes  Microalbuminuria  Hypertension: Resistant  Hyperlipidemia  Peripheral edema with volume overload and anasarca  Normal anion gap metabolic acidosis  Hyponatremia    Plan:  Unfortunately he continues to not respond very well to medical management  His blood pressure is quite elevated  We will start him on calcitriol to try to help with the hypoparathyroidism causing hypocalcemia to try to help raise the calcium  We will add Cardura for now for blood pressure  He is currently on nifedipine and hydralazine  He is on an SGLT2  He continues with massive volume overload  I am going to go ahead and put him on Lasix we really do not have any good options here Lasix will lose the potassium and calcium however he is massively volume overloaded and if his kidneys will not tolerate this then we will have to discuss renal replacement therapy with him in detail going forward                  Mauricio Rachel, DO

## 2024-12-18 NOTE — PROGRESS NOTES
Pt reviewed during Care Rounds today and he is not ready for discharge; ADOD is not for several days as it is possible pt will require outpatient dialysis.  Update from therapies that pt did not do well with treatment today and they are recommending SNF placement. ZAK met with pt to discuss the above and he is now agreeable. We also discussed Medicaid and long-term care.  Permission granted to contact family.  Call placed to daughter/Kim Hooks [phone] per pt's request to discuss discharge planning.  Daughter shares that pt has been declining for sometime now.  She has been looking at long-term options; however, has not finalized any arrangements thus far.  Daughter shares that pt has VA benefits, but she is not sure to what extent.  Daughter was educated to SNF placement and the precert process and verbalized understanding.  Facility of choice is any facility in [place] or [place] , although [place] is desired preference.  ZAK conferenced with Dr. Rachel re: possible dialysis.  It is too soon to tell if pt will require this post discharge, but very likely.  For now, Dr. Rachel is trying Lasix.  It is very likely pt will have long-term care needs post skilled stay so SW completed a Medicaid application with pt and faxed to [facility].  Original copy mailed to pt's daughter at [address] [place], [place] [address] per her request.  Call placed to the [facility] and spoke to Ena/ZAK.  Pt is not service connected so long-term care assistance is not available to him through the VA, but room/board could be covered in a VA contracted LTC facility with hospice.  Daughter updated by phone. Referrals attached in [facility]Port; awaiting replies.  Care Transitions will continue to follow.     PATRICIO Weldon

## 2024-12-18 NOTE — PROGRESS NOTES
Occupational Therapy    OT Treatment    Patient Name: Garcia Haskins  MRN: [id number]  Today's Date: 12/18/2024  Time Calculation  Start Time: 1146  Stop Time: 1158  Time Calculation (min): 12 min       311/311-A    Assessment:  OT Assessment: pt more impulsive this date with severe safety and judgement deficits during ADl and mobility.    pt unable to manage FWW or O2 cord safely and looses his balance multiple times during session.  pt is unable to self correct and needs mod A for balance recovery.  pt often scissoring with BLEs during obstacle negotiation. pt is extremely high fall risk  Prognosis: Good  Barriers to Discharge Home: Caregiver assistance, Cognition needs  Caregiver Assistance: Patient lives alone and/or does not have reliable caregiver assistance  Cognition Needs: Cognition-related high falls risk, Recollection or understanding of precautions/restrictions limited, 24hr supervision for safety awareness needed  Evaluation/Treatment Tolerance: Patient limited by fatigue  Medical Staff Made Aware: Yes  End of Session Communication: care transitions  End of Session Patient Position: Up in chair, Alarm on (call light in reach)  OT Assessment Results: Decreased ADL status, Decreased functional mobility (balance deficits)  Prognosis: Good  Evaluation/Treatment Tolerance: Patient limited by fatigue  Medical Staff Made Aware: Yes    Plan:  Treatment Interventions: ADL retraining, Functional transfer training, Endurance training, Patient/family training, Equipment evaluation/education, Neuromuscular reeducation, Compensatory technique education  OT Frequency: 3 times per week  OT Discharge Recommendations: Moderate intensity level of continued care  Equipment Recommended upon Discharge: Wheeled walker  OT Recommended Transfer Status: Assist of 1  OT - OK to Discharge: Yes (once medically stable)  Treatment Interventions: ADL retraining, Functional transfer training, Endurance training, Patient/family  training, Equipment evaluation/education, Neuromuscular reeducation, Compensatory technique education  Subjective     Current Problem:  Patient Active Problem List   Diagnosis    Type 2 diabetes mellitus with hyperglycemia, without long-term current use of insulin    Primary hypertension    Other hyperlipidemia    Diaphragmatic hernia    Acquired hypothyroidism    Hyperparathyroidism due to renal insufficiency (Multi)    Stage 3a chronic kidney disease (Multi)    Anemia of chronic disorder    Anemia    Benign colonic polyp    CKD (chronic kidney disease)    Controlled type 2 diabetes mellitus with hyperglycemia, without long-term current use of insulin    History of stroke without residual deficits    Hypoparathyroidism    Indigestion    Nocturia    Sleep apnea    Hypothyroidism    Hypercholesterolemia    Hyperlipidemia    Benign hypertension    HTN (hypertension)    Peripheral vascular disease (CMS-HCC)    Diabetes mellitus (Multi)    Body mass index (BMI) 24.0-24.9, adult    Acute kidney injury (CMS-HCC)    Hypokalemia       General:  OT Received On: 12/18/24  Reason for Referral: Garcia Haskins is a 74 y.o. male presenting with generalized weakness for a week and tired all the time.- hypocalcemia  Referred By: Kenton POST  Past Medical History Relevant to Rehab: Hx of GLENIS, hypokalemia, hypomagnesemia, hypocalcemia, CKDII, HTN, CVA, DM2, hyperlipidemia, hypothyroidism  Missed Visit Reason: Other (Comment), Patient refused (pt is asleep with lunch tray pushed to the side.  pt wakes easily but states that he was up all night and is too tired to particpate in OT session.)  Family/Caregiver Present: No  Co-Treatment: PT  Co-Treatment Reason: to maximize pt safety during balance activities  Prior to Session Communication: Bedside nurse  Patient Position Received: Bed, 3 rail up, Alarm on  General Comment: pt agreeable to session although states he does not feel well and is very fatigued    Vital Signs:  Vital  Signs  SpO2: 95 %    Pain:  Pain Assessment  Pain Assessment: 0-10  0-10 (Numeric) Pain Score: 0 - No pain  Objective        Bed Mobility/Transfers: Bed Mobility  Bed Mobility: Yes  Bed Mobility 1  Bed Mobility 1: Supine to sitting  Level of Assistance 1: Close supervision  Transfer 1  Technique 1: Sit to stand  Transfer Device 1: Gait belt  Transfer Level of Assistance 1: Contact guard       Therapy/Activity:    Therapeutic Activity  Therapeutic Activity Performed: Yes  Therapeutic Activity 1: pt participates in safety education with mobility around room, tight turns, obstacle negotiation.  pt needing mod A at times for LOB and inability to self correct.  pt is severely impulsive with both FWW and O2 cord management and cannot carry over skills learned by end of session.  pt looses his balance several times- needing assist with each trial  Therapeutic Activity 2: standing activity tolerance training with unsupported stand reaching across body in various planes.  pt with quick fatigue          Strength:  Strength Comments: BUE Fair+    Outcome Measures:[facility] Daily Activity  Putting on and taking off regular lower body clothing: A lot  Bathing (including washing, rinsing, drying): A lot  Putting on and taking off regular upper body clothing: A little  Toileting, which includes using toilet, bedpan or urinal: A little  Taking care of personal grooming such as brushing teeth: A little  Eating Meals: A little  Daily Activity - Total Score: 16  Education Documentation  Body Mechanics, taught by Deanna De Jesus OT at 12/18/2024 12:13 PM.  Learner: Patient  Readiness: Eager  Method: Explanation, Demonstration  Response: Needs Reinforcement, No Evidence of Learning  Comment: pt stuggling with comprehension of safety with both FWW and O2 cord management and would need extensive education to be indep and reduce risk for falls    Precautions, taught by Deanna De Jesus OT at 12/18/2024 12:13 PM.  Learner: Patient  Readiness:  Eager  Method: Explanation, Demonstration  Response: Needs Reinforcement, No Evidence of Learning  Comment: pt stuggling with comprehension of safety with both FWW and O2 cord management and would need extensive education to be indep and reduce risk for falls    ADL Training, taught by Deanna De Jesus OT at 12/18/2024 12:13 PM.  Learner: Patient  Readiness: Eager  Method: Explanation, Demonstration  Response: Needs Reinforcement, No Evidence of Learning  Comment: pt stuggling with comprehension of safety with both FWW and O2 cord management and would need extensive education to be indep and reduce risk for falls    Education Comments  No comments found.           EDUCATION:       Goals:  Encounter Problems       Encounter Problems (Active)       ADLs       Patient with complete lower body dressing with modified independent level of assistance   (Not Progressing)       Start:  12/16/24    Expected End:  12/30/24         Goal Note       Pt overall not feeling well and becoming more weak due to prolonged hospital stay              Patient will complete toileting including hygiene clothing management/hygiene with modified independent level of assistance . (Not Progressing)       Start:  12/16/24    Expected End:  12/30/24            pt will tolerate 25 minutes of ADL with all vitals WNL (Not Progressing)       Start:  12/16/24    Expected End:  12/30/24               BALANCE       Pt will maintain dynamic standing balance during ADL task with modified independent level of assistance in order to demonstrate decreased risk of falling and improved postural control. (Not Progressing)       Start:  12/16/24    Expected End:  12/30/24

## 2024-12-18 NOTE — PROGRESS NOTES
Garcia Haskins is a 74 y.o. male on day 5 of admission presenting with Hypokalemia.      Subjective   Patient seen and examined at bedside.  This is the first day that I am seeing the patient.  He has been managed in the hospital for the last 5 days for acute renal failure, hypocalcemia, hypokalemia.  Blood pressure continues to be elevated today.  Case was discussed with nephrologist, Dr. Rachel.  He is planning to trial the patient on IV Lasix considering the patient's volume overload state.  There is concerns that the patient will require renal replacement therapy considering he is not responding to medications.       Objective     Last Recorded Vitals  /72 (BP Location: Left arm, Patient Position: Lying)   Pulse 87 Comment: following ambulation/activity  Temp 36.5 °C (97.7 °F) (Oral)   Resp 20   Wt 76.2 kg (167 lb 15.9 oz)   SpO2 93%   Intake/Output last 3 Shifts:    Intake/Output Summary (Last 24 hours) at 12/18/2024 1511  Last data filed at 12/18/2024 1258  Gross per 24 hour   Intake 480 ml   Output 225 ml   Net 255 ml       Admission Weight  Weight: 72.6 kg (160 lb) (12/13/24 1211)    Daily Weight  12/13/24 : 76.2 kg (167 lb 15.9 oz)    Image Results  ECG 12 lead  Normal sinus rhythm  Nonspecific T wave abnormality  Prolonged QT  Abnormal ECG  When compared with ECG of 13-DEC-2024 12:17, (unconfirmed)  ST no longer depressed in Lateral leads  Nonspecific T wave abnormality, improved in Lateral leads  QT has lengthened  See ED provider note for full interpretation and clinical correlation  Confirmed by Martina Petersen ([id number]) on 12/14/2024 2:22:17 PM        Physical exam:  General:     Well appearing  HENT:      Head: Normocephalic and atraumatic.      Mouth: Mucous membranes are moist.   Eyes:      Pupils are equal, round, and reactive to light.   Cardiovascular:      Normal rate and regular rhythm. Normal S1, S2.  No murmurs, clicks, gallops.   Pulmonary:      Crackles in bilateral lung fields.  No  wheezing or rhonchi heard.  [place].   Abdominal:       Bowel sounds are normal.      Abdomen is soft, nontender, nondistended  Skin:      No lesions seen  Musculoskeletal:         Extremities: +2 pitting edema bilateral lower extremities present. No deformities with no abnormal range of motion     Neck supple.   Neurological:      Mental Status: He is alert and oriented X3     CN II-XII intact.  No focal neurologic deficits appreciated.     Relevant Results               Assessment/Plan                  Assessment & Plan  Hypokalemia    1.  Acute on chronic kidney disease stage III: The patient has significant volume overload associated with this.  Nephrology is consulted.  They will trial the patient on IV Lasix to address his volume status.  Continue monitoring urine output and BMPs.  Will need to keep a close eye on electrolytes while he is on IV diuretics considering he is already having issues with maintaining his potassium and calcium.    2.  Hypocalcemia: Continue calcium replacement with Tums 1000 mg 3 times daily.  Start calcitriol.  Continue following calcium levels.    3.  Hypokalemia: Continue monitoring BMPs and replace potassium as needed.    4.  Anasarca    5.  Anemia of chronic disease: Hemoglobin 7.5 today.  Patient started on ferrous sulfate.    6.  Hypertension: Continue hydralazine and nifedipine.  Will follow-up with nephrology in regards to further agents.    7.  Diabetes: Continue Jardiance.    Disposition:Trial IV Lasix.  Monitor urine output and BMPs.  Follow-up with nephrology recommendations.  Anticipate patient remain hospitalized for at least additional 48 hours.  He is at high risk for continued decompensation requiring renal replacement therapy.              Adam Kinney DO

## 2024-12-18 NOTE — PROGRESS NOTES
Physical Therapy    Physical Therapy Treatment    Patient Name: Garcia Haskins  MRN: [id number]  Department: [facility]  Room: [address] [place] [address]  Today's Date: 12/18/2024  Time Calculation  Start Time: 1147  Stop Time: 1159  Time Calculation (min): 12 min         Assessment/Plan   patient continues to be severely impulsive with poor insight and reduced flexibility of thought.  He needed more assist today with LOB and with mobility compared to previous.  Patient very high risk for falls.  Requires frequent to constant cues and redirection for safety and to reduce impulsivity.  He continues to be unable to manage his oxygen tubing and is unaware if it wraps around his legs or gets caught in FWW  PT Assessment  End of Session Communication: PCT/NA/CTA  End of Session Patient Position: Alarm on (call light in reach)  PT Plan  Inpatient/Swing Bed or Outpatient: Inpatient  PT Plan  Treatment/Interventions: Bed mobility, Transfer training, Gait training, Balance training, Neuromuscular re-education, Strengthening, Stair training, Endurance training, Therapeutic exercise, Therapeutic activity, Home exercise program  PT Plan: Ongoing PT  PT Frequency: 4 times per week  PT Discharge Recommendations: Moderate intensity level of continued care, Low intensity level of continued care  Equipment Recommended upon Discharge: Wheeled walker  PT Recommended Transfer Status: Stand by assist, Contact guard  PT - OK to Discharge:  (once medically appropriate and safe DC plan in place)      General Visit Information:   PT  Visit  PT Received On: 12/18/24  General  Reason for Referral: Garcia Haskins is a 74 y.o. male presenting with generalized weakness for a week and tired all the time.- hypocalcemia  Referred By: Kenton POST  Past Medical History Relevant to Rehab: Hx of GLENIS, hypokalemia, hypomagnesemia, hypocalcemia, CKDII, HTN, CVA, DM2, hyperlipidemia, hypothyroidism  Family/Caregiver Present: No  Co-Treatment: OT  Co-Treatment Reason: to maximize  pt safety with mobilty while addressing discipline-specific goals  Prior to Session Communication: Bedside nurse  Patient Position Received: Bed, 3 rail up, Alarm on  General Comment: pt agreeable to session although states he does not feel well and is very fatigued    Subjective   Precautions:  Precautions  Medical Precautions: Oxygen therapy device and L/min, Fall precautions (1L)            Objective   Pain:  Pain Assessment  Pain Assessment: 0-10  0-10 (Numeric) Pain Score: 0 - No pain  Cognition:  Cognition  Orientation Level: Oriented X4  Safety/Judgement: Exceptions to WFL  Insight: Moderate  Impulsive: Severely  Flexibility of Thought: Reduced flexibility  Coordination:   impaired  Postural Control:  Cues for posture during mobiity (to stand erect and not bend over)  Activity Tolerance:  Activity Tolerance  Endurance: Decreased tolerance for upright activites, Tolerates less than 10 min exercise, no significant change in vital signs  Treatments:       Therapeutic Activity  Therapeutic Activity Performed: Yes  Therapeutic Activity 1: mobiity in room with FWW, maxA to dependent to manage O2 tubing and unable to manage even with instruction/cues; turning and losing balance.  Poor safety awareness, severely impulsive; several LOB requiring up to modA to recver  Therapeutic Activity 2: standing dynamic balance at FWW without UE or brief UE support to complete.  quick to fatigue    Bed Mobility  Bed Mobility: Yes  Bed Mobility 1  Bed Mobility 1: Supine to sitting  Level of Assistance 1: Close supervision    Ambulation/Gait Training  Ambulation/Gait Training Performed: Yes  Ambulation/Gait Training 1  Surface 1: Level tile  Device 1: Rolling walker  Gait Support Devices: Gait belt  Assistance 1: Contact guard, Moderate assistance, Maximum verbal cues  Quality of Gait 1: Diminished heel strike, Inconsistent stride length, Decreased step length (impulsive, dependent for O2 tubing management, several LOB requiring up  to modA to recover;  difficulty following commands for safety as well)  Comments/Distance (ft) 1: 30'x2  Transfers  Transfer: Yes  Transfer 1  Technique 1: Sit to stand, Stand to sit  Transfer Device 1: Gait belt (FWW)  Transfer Level of Assistance 1: Contact guard, Minimum assistance, Maximum verbal cues  Trials/Comments 1: cues for safety, hand placement    Outcome Measures:  [facility] Basic Mobility  Turning from your back to your side while in a flat bed without using bedrails: None  Moving from lying on your back to sitting on the side of a flat bed without using bedrails: A little  Moving to and from bed to chair (including a wheelchair): A lot  Standing up from a chair using your arms (e.g. wheelchair or bedside chair): A lot  To walk in hospital room: A lot  Climbing 3-5 steps with railing: Total  Basic Mobility - Total Score: 14    Education Documentation  Mobility Training, taught by Niki Childs, PT at 12/18/2024  2:30 PM.  Learner: Patient  Readiness: Acceptance  Method: Explanation, Demonstration  Response: Verbalizes Understanding, Needs Reinforcement, No Evidence of Learning  Comment: verbalizes understanding but unable to demonstrate regarding safety, hand placement, O2 cord management, use of device    Education Comments  No comments found.        OP EDUCATION:       Encounter Problems       Encounter Problems (Active)       PT Goals       Bed Mobility (Progressing)       Start:  12/14/24    Expected End:  12/28/24       Patient will transfer supine<>sitting edge of bed without use of handrail independently to improve functional mobility toward baseline upon discharge.           Transfers (Progressing)       Start:  12/14/24    Expected End:  12/28/24       Patient will transfer sit<>stand with modified independence with use of least restrictive assistive device to improve functional mobility toward baseline upon discharge           Ambulation (Progressing)       Start:  12/14/24    Expected End:   12/28/24       Patient will ambulate 75 feet with use of least restrictive assistive device with SBA while demonstrating good safety and balance without significant changes in vital signs to improve functional mobility toward baseline upon discharge           Strength (Progressing)       Start:  12/14/24    Expected End:  12/28/24       Patient will demonstrate gross B LE musculature strength 5/5 MMT to improve stability with completion of transfers and ambulation toward baseline upon discharge              Pain - Adult

## 2024-12-18 NOTE — CARE PLAN
Problem: Nutrition  Goal: Oral intake greater 75%  Outcome: Progressing  Goal: Consume prescribed supplement  Outcome: Progressing  Goal: Adequate PO fluid intake  Outcome: Progressing  Goal: BG  mg/dL  Outcome: Progressing  Goal: Lab values WNL  Outcome: Progressing  Goal: Electrolytes WNL  Outcome: Progressing  Goal: Maintain stable weight  Outcome: Progressing     Problem: Diabetes  Goal: Achieve decreasing blood glucose levels by end of shift  Outcome: Progressing  Goal: Increase stability of blood glucose readings by end of shift  Outcome: Progressing  Goal: Decrease in ketones present in urine by end of shift  Outcome: Progressing  Goal: Maintain electrolyte levels within acceptable range throughout shift  Outcome: Progressing  Goal: Maintain glucose levels >70mg/dl to <250mg/dl throughout shift  Outcome: Progressing  Goal: No changes in neurological exam by end of shift  Outcome: Progressing  Goal: Learn about and adhere to nutrition recommendations by end of shift  Outcome: Progressing  Goal: Vital signs within normal range for age by end of shift  Outcome: Progressing  Goal: Increase self care and/or family involovement by end of shift  Outcome: Progressing  Goal: Receive DSME education by end of shift  Outcome: Progressing     Problem: Skin  Goal: Participates in plan/prevention/treatment measures  Outcome: Progressing  Flowsheets (Taken 12/18/2024 0747)  Participates in plan/prevention/treatment measures: Increase activity/out of bed for meals  Goal: Prevent/manage excess moisture  Outcome: Progressing  Flowsheets (Taken 12/18/2024 0747)  Prevent/manage excess moisture: Moisturize dry skin  Goal: Prevent/minimize sheer/friction injuries  Outcome: Progressing  Flowsheets (Taken 12/18/2024 0747)  Prevent/minimize sheer/friction injuries: Use pull sheet  Goal: Promote/optimize nutrition  Outcome: Progressing  Flowsheets (Taken 12/18/2024 0747)  Promote/optimize nutrition: Consume > 50%  meals/supplements     Problem: Pain - Adult  Goal: Verbalizes/displays adequate comfort level or baseline comfort level  Outcome: Progressing     Problem: Safety - Adult  Goal: Free from fall injury  Outcome: Progressing     Problem: Discharge Planning  Goal: Discharge to home or other facility with appropriate resources  Outcome: Progressing     Problem: Chronic Conditions and Co-morbidities  Goal: Patient's chronic conditions and co-morbidity symptoms are monitored and maintained or improved  Outcome: Progressing     Problem: Fall/Injury  Goal: Not fall by end of shift  Outcome: Progressing  Goal: Be free from injury by end of the shift  Outcome: Progressing  Goal: Verbalize understanding of personal risk factors for fall in the hospital  Outcome: Progressing  Goal: Verbalize understanding of risk factor reduction measures to prevent injury from fall in the home  Outcome: Progressing  Goal: Use assistive devices by end of the shift  Outcome: Progressing  Goal: Pace activities to prevent fatigue by end of the shift  Outcome: Progressing   The patient's goals for the shift include no falls    The clinical goals for the shift include decreased shortness of breath and no falls    Over the shift, the patient did not make progress toward the following goals. Barriers to progression include . Recommendations to address these barriers include .

## 2024-12-19 LAB
ABO GROUP (TYPE) IN BLOOD: NORMAL
ABO GROUP (TYPE) IN BLOOD: NORMAL
ALBUMIN SERPL BCP-MCNC: 2.9 G/DL (ref 3.4–5)
ALP SERPL-CCNC: 272 U/L (ref 33–136)
ALT SERPL W P-5'-P-CCNC: 30 U/L (ref 10–52)
ANION GAP SERPL CALC-SCNC: 13 MMOL/L (ref 10–20)
ANTIBODY SCREEN: NORMAL
AST SERPL W P-5'-P-CCNC: 36 U/L (ref 9–39)
BILIRUB SERPL-MCNC: 0.3 MG/DL (ref 0–1.2)
BLOOD EXPIRATION DATE: NORMAL
BUN SERPL-MCNC: 34 MG/DL (ref 6–23)
CA-I BLD-SCNC: 0.79 MMOL/L (ref 1.1–1.33)
CA-I BLD-SCNC: 0.79 MMOL/L (ref 1.1–1.33)
CALCIUM SERPL-MCNC: 6.2 MG/DL (ref 8.6–10.3)
CHLORIDE SERPL-SCNC: 95 MMOL/L (ref 98–107)
CO2 SERPL-SCNC: 25 MMOL/L (ref 21–32)
CREAT SERPL-MCNC: 2.74 MG/DL (ref 0.5–1.3)
DISPENSE STATUS: NORMAL
EGFRCR SERPLBLD CKD-EPI 2021: 23 ML/MIN/1.73M*2
ERYTHROCYTE [DISTWIDTH] IN BLOOD BY AUTOMATED COUNT: 16.5 % (ref 11.5–14.5)
GLUCOSE SERPL-MCNC: 95 MG/DL (ref 74–99)
HCT VFR BLD AUTO: 20.9 % (ref 41–52)
HGB BLD-MCNC: 6.9 G/DL (ref 13.5–17.5)
MAGNESIUM SERPL-MCNC: 1.76 MG/DL (ref 1.6–2.4)
MCH RBC QN AUTO: 25.4 PG (ref 26–34)
MCHC RBC AUTO-ENTMCNC: 33 G/DL (ref 32–36)
MCV RBC AUTO: 77 FL (ref 80–100)
NRBC BLD-RTO: 0 /100 WBCS (ref 0–0)
PLATELET # BLD AUTO: 283 X10*3/UL (ref 150–450)
POTASSIUM SERPL-SCNC: 3.4 MMOL/L (ref 3.5–5.3)
PRODUCT BLOOD TYPE: NORMAL
PRODUCT CODE: NORMAL
PROT SERPL-MCNC: 5.8 G/DL (ref 6.4–8.2)
RBC # BLD AUTO: 2.72 X10*6/UL (ref 4.5–5.9)
RH FACTOR (ANTIGEN D): NORMAL
RH FACTOR (ANTIGEN D): NORMAL
SODIUM SERPL-SCNC: 130 MMOL/L (ref 136–145)
UNIT ABO: NORMAL
UNIT NUMBER: NORMAL
UNIT RH: NORMAL
UNIT VOLUME: 286
WBC # BLD AUTO: 8.7 X10*3/UL (ref 4.4–11.3)
XM INTEP: NORMAL

## 2024-12-19 PROCEDURE — 94761 N-INVAS EAR/PLS OXIMETRY MLT: CPT

## 2024-12-19 PROCEDURE — 2500000002 HC RX 250 W HCPCS SELF ADMINISTERED DRUGS (ALT 637 FOR MEDICARE OP, ALT 636 FOR OP/ED): Performed by: STUDENT IN AN ORGANIZED HEALTH CARE EDUCATION/TRAINING PROGRAM

## 2024-12-19 PROCEDURE — 2500000004 HC RX 250 GENERAL PHARMACY W/ HCPCS (ALT 636 FOR OP/ED): Performed by: HOSPITALIST

## 2024-12-19 PROCEDURE — 86901 BLOOD TYPING SEROLOGIC RH(D): CPT | Performed by: STUDENT IN AN ORGANIZED HEALTH CARE EDUCATION/TRAINING PROGRAM

## 2024-12-19 PROCEDURE — 82330 ASSAY OF CALCIUM: CPT

## 2024-12-19 PROCEDURE — 2500000002 HC RX 250 W HCPCS SELF ADMINISTERED DRUGS (ALT 637 FOR MEDICARE OP, ALT 636 FOR OP/ED): Performed by: HOSPITALIST

## 2024-12-19 PROCEDURE — 85027 COMPLETE CBC AUTOMATED: CPT | Performed by: INTERNAL MEDICINE

## 2024-12-19 PROCEDURE — 2500000004 HC RX 250 GENERAL PHARMACY W/ HCPCS (ALT 636 FOR OP/ED): Performed by: STUDENT IN AN ORGANIZED HEALTH CARE EDUCATION/TRAINING PROGRAM

## 2024-12-19 PROCEDURE — 36415 COLL VENOUS BLD VENIPUNCTURE: CPT | Performed by: STUDENT IN AN ORGANIZED HEALTH CARE EDUCATION/TRAINING PROGRAM

## 2024-12-19 PROCEDURE — 94640 AIRWAY INHALATION TREATMENT: CPT

## 2024-12-19 PROCEDURE — 2500000004 HC RX 250 GENERAL PHARMACY W/ HCPCS (ALT 636 FOR OP/ED): Performed by: INTERNAL MEDICINE

## 2024-12-19 PROCEDURE — 36415 COLL VENOUS BLD VENIPUNCTURE: CPT | Performed by: INTERNAL MEDICINE

## 2024-12-19 PROCEDURE — 2500000001 HC RX 250 WO HCPCS SELF ADMINISTERED DRUGS (ALT 637 FOR MEDICARE OP): Performed by: STUDENT IN AN ORGANIZED HEALTH CARE EDUCATION/TRAINING PROGRAM

## 2024-12-19 PROCEDURE — 99233 SBSQ HOSP IP/OBS HIGH 50: CPT | Performed by: INTERNAL MEDICINE

## 2024-12-19 PROCEDURE — 2500000001 HC RX 250 WO HCPCS SELF ADMINISTERED DRUGS (ALT 637 FOR MEDICARE OP): Performed by: HOSPITALIST

## 2024-12-19 PROCEDURE — 2500000005 HC RX 250 GENERAL PHARMACY W/O HCPCS: Performed by: EMERGENCY MEDICINE

## 2024-12-19 PROCEDURE — 86923 COMPATIBILITY TEST ELECTRIC: CPT

## 2024-12-19 PROCEDURE — 99233 SBSQ HOSP IP/OBS HIGH 50: CPT | Performed by: STUDENT IN AN ORGANIZED HEALTH CARE EDUCATION/TRAINING PROGRAM

## 2024-12-19 PROCEDURE — 83735 ASSAY OF MAGNESIUM: CPT | Performed by: STUDENT IN AN ORGANIZED HEALTH CARE EDUCATION/TRAINING PROGRAM

## 2024-12-19 PROCEDURE — 84075 ASSAY ALKALINE PHOSPHATASE: CPT | Performed by: INTERNAL MEDICINE

## 2024-12-19 PROCEDURE — P9016 RBC LEUKOCYTES REDUCED: HCPCS

## 2024-12-19 PROCEDURE — 2500000001 HC RX 250 WO HCPCS SELF ADMINISTERED DRUGS (ALT 637 FOR MEDICARE OP): Performed by: INTERNAL MEDICINE

## 2024-12-19 PROCEDURE — 82330 ASSAY OF CALCIUM: CPT | Performed by: STUDENT IN AN ORGANIZED HEALTH CARE EDUCATION/TRAINING PROGRAM

## 2024-12-19 PROCEDURE — 1100000001 HC PRIVATE ROOM DAILY

## 2024-12-19 PROCEDURE — 36430 TRANSFUSION BLD/BLD COMPNT: CPT

## 2024-12-19 RX ORDER — POTASSIUM CHLORIDE 1.5 G/1.58G
40 POWDER, FOR SOLUTION ORAL ONCE
Status: COMPLETED | OUTPATIENT
Start: 2024-12-19 | End: 2024-12-19

## 2024-12-19 RX ORDER — METOPROLOL TARTRATE 1 MG/ML
10 INJECTION, SOLUTION INTRAVENOUS EVERY 6 HOURS PRN
Status: DISCONTINUED | OUTPATIENT
Start: 2024-12-19 | End: 2024-12-20 | Stop reason: HOSPADM

## 2024-12-19 RX ORDER — DOXAZOSIN 1 MG/1
2 TABLET ORAL EVERY 12 HOURS SCHEDULED
Status: DISCONTINUED | OUTPATIENT
Start: 2024-12-19 | End: 2024-12-20 | Stop reason: HOSPADM

## 2024-12-19 ASSESSMENT — COGNITIVE AND FUNCTIONAL STATUS - GENERAL
HELP NEEDED FOR BATHING: A LITTLE
STANDING UP FROM CHAIR USING ARMS: A LITTLE
TOILETING: A LITTLE
DRESSING REGULAR LOWER BODY CLOTHING: A LITTLE
DAILY ACTIVITIY SCORE: 19
WALKING IN HOSPITAL ROOM: A LITTLE
DAILY ACTIVITIY SCORE: 20
TOILETING: A LITTLE
HELP NEEDED FOR BATHING: A LITTLE
CLIMB 3 TO 5 STEPS WITH RAILING: A LITTLE
PERSONAL GROOMING: A LITTLE
CLIMB 3 TO 5 STEPS WITH RAILING: A LITTLE
DRESSING REGULAR UPPER BODY CLOTHING: A LITTLE
MOBILITY SCORE: 20
MOVING FROM LYING ON BACK TO SITTING ON SIDE OF FLAT BED WITH BEDRAILS: A LITTLE
MOVING TO AND FROM BED TO CHAIR: A LITTLE
DRESSING REGULAR LOWER BODY CLOTHING: A LITTLE
PERSONAL GROOMING: A LITTLE
STANDING UP FROM CHAIR USING ARMS: A LITTLE
MOBILITY SCORE: 20
MOVING TO AND FROM BED TO CHAIR: A LITTLE

## 2024-12-19 ASSESSMENT — PAIN - FUNCTIONAL ASSESSMENT
PAIN_FUNCTIONAL_ASSESSMENT: 0-10
PAIN_FUNCTIONAL_ASSESSMENT: 0-10

## 2024-12-19 ASSESSMENT — PAIN SCALES - GENERAL
PAINLEVEL_OUTOF10: 0 - NO PAIN
PAINLEVEL_OUTOF10: 3

## 2024-12-19 NOTE — PROGRESS NOTES
Occupational Therapy                 Therapy Communication Note    Patient Name: Garcia Haskins  MRN: [id number]  Department: [facility]  Room: [address] [place], [place] [address]  Today's Date: 12/19/2024     Discipline: Occupational Therapy    OT Missed Visit: Yes     Missed Visit Reason: Missed Visit Reason: Patient placed on medical hold (pt with low hemoglobin and planning to receive blood)

## 2024-12-19 NOTE — PROGRESS NOTES
Physical Therapy                 Therapy Communication Note    Patient Name: Garcia Haskins  MRN: [id number]  Department: [facility]  Room: [address] [place], [place] [address]  Today's Date: 12/19/2024     Discipline: Physical Therapy    PT Missed Visit: Yes     Missed Visit Reason: Missed Visit Reason: Patient placed on medical hold (pt with low hemoglobin and planning to receive blood)    Missed Time: Attempt

## 2024-12-19 NOTE — PROGRESS NOTES
Garcia Haskins is a 74 y.o. male on day 6 of admission presenting with Hypokalemia.      Subjective   Patient seen and examined at the bedside this morning  He is resting comfortably in bed  He still feels short of breath although he feels like his breathing is maybe a little bit better  He had very good urine output over the last 24 hours with -2350       Objective          Vitals 24HR  Heart Rate:  []   Temp:  [36.3 °C (97.3 °F)-37 °C (98.6 °F)]   Resp:  [18-20]   BP: (150-184)/(62-77)   SpO2:  [91 %-96 %]         Intake/Output last 3 Shifts:    Intake/Output Summary (Last 24 hours) at 12/19/2024 0903  Last data filed at 12/19/2024 0856  Gross per 24 hour   Intake 1250 ml   Output 2650 ml   Net -1400 ml       Physical Exam  Constitutional:       Appearance: Normal appearance.   HENT:      Head: Normocephalic and atraumatic.      Right Ear: External ear normal.      Left Ear: External ear normal.      Nose: Nose normal.      Mouth/Throat:      Mouth: Mucous membranes are moist.      Pharynx: Oropharynx is clear.   Eyes:      Extraocular Movements: Extraocular movements intact.      Conjunctiva/sclera: Conjunctivae normal.      Pupils: Pupils are equal, round, and reactive to light.   Cardiovascular:      Rate and Rhythm: Normal rate and regular rhythm.   Pulmonary:      Effort: Pulmonary effort is normal.      Breath sounds: Normal breath sounds.   Abdominal:      General: Abdomen is flat.      Palpations: Abdomen is soft.   Musculoskeletal:         General: Swelling present.      Right lower leg: Edema present.      Left lower leg: Edema present.   Skin:     General: Skin is warm and dry.   Neurological:      General: No focal deficit present.      Mental Status: He is alert and oriented to person, place, and time.   Psychiatric:         Mood and Affect: Mood normal.         Behavior: Behavior normal.     Scheduled medications  calcitriol, 0.5 mcg, oral, BID  calcium carbonate, 1,000 mg, oral,  TID  clopidogrel, 75 mg, oral, Daily  doxazosin, 2 mg, oral, q12h RYLIE  empagliflozin, 10 mg, oral, Daily  enoxaparin, 30 mg, subcutaneous, q24h  ferrous sulfate (325 mg ferrous sulfate), 65 mg of iron, oral, Daily  furosemide, 40 mg, intravenous, q12h  hydrALAZINE, 100 mg, oral, TID  ipratropium-albuteroL, 3 mL, nebulization, q4h  levothyroxine, 175 mcg, oral, Nightly  magnesium oxide, 400 mg, oral, Daily  NIFEdipine ER, 90 mg, oral, Daily  polyethylene glycol, 17 g, oral, Daily  potassium chloride CR, 10 mEq, oral, BID  sodium bicarbonate, 1,300 mg, oral, BID      Continuous medications     PRN medications  PRN medications: acetaminophen **OR** acetaminophen **OR** acetaminophen, acetaminophen **OR** acetaminophen **OR** acetaminophen, guaiFENesin, ipratropium-albuteroL, metoprolol, morphine, ondansetron ODT **OR** ondansetron, oxygen, phenoL, sodium chloride      Relevant Results               Assessment/Plan              Assessment & Plan  Hypokalemia    Acute renal failure: Likely intrinsic: ATN  Severe hypocalcemia: Improving  CKD 3 with baseline creatinine about 1.8  Right renal cyst  Atrophic right kidney  Anemia of chronic disease  Type 2 diabetes  Microalbuminuria  Hypertension: Resistant  Hyperlipidemia  Peripheral edema with volume overload and anasarca  Normal anion gap metabolic acidosis  Hyponatremia    Plan:  I am encouraged over the last 24 hours  He did not respond well to Lasix a couple of days ago but since he was not making any progress I went ahead and restarted and this time he seems to be responding.  His calcium is also trending upward with addition of calcitriol  Continue calcium replacement as he is on  Continue Lasix as he is on  His hemoglobin has fallen to less than 7 and so he is getting a blood transfusion    I did discuss with his daughter Kim Hooks over the phone.  I tried to describe as best I could his clinical course  I explained to her that at this point suspicion of  pneumonia is quite small this is likely volume related with his renal dysfunction  His wife was on dialysis in the past and it is not something that he would likely want  He is also going to need help with his living as she states he is likely not going to be able to go home and live on his own  She is working with social work on this  I did ask her to discuss CODE STATUS as with everything she was telling me a code would likely not to be in his best interest  With how he has started to respond now I do not think dialysis is even going to be needed and I do not think he would even want it anyways and so at this point we will continue medical management and start to plan for outpatient care.                 Mauricio Rachel, DO

## 2024-12-19 NOTE — PROGRESS NOTES
Garcia Haskins is a 74 y.o. male on day 6 of admission presenting with Hypokalemia.      Subjective   Patient seen and examined at bedside.  No acute events overnight.  He appears to be responding well to IV Lasix producing 2350 cc of urine.  Case was discussed with nephrologist, Dr. Rachel.  He is encouraged by the patient's progress with diuresis and does not believe he will need dialysis at this point.  Patient states that he continues to have dyspnea on exertion but it has improved compared to yesterday.       Objective     Last Recorded Vitals  /72   Pulse 82   Temp 37 °C (98.6 °F)   Resp 20   Wt 76.2 kg (167 lb 15.9 oz)   SpO2 92%   Intake/Output last 3 Shifts:    Intake/Output Summary (Last 24 hours) at 12/19/2024 1107  Last data filed at 12/19/2024 1024  Gross per 24 hour   Intake 1530 ml   Output 3150 ml   Net -1620 ml       Admission Weight  Weight: 72.6 kg (160 lb) (12/13/24 1211)    Daily Weight  12/13/24 : 76.2 kg (167 lb 15.9 oz)    Image Results  ECG 12 lead  Normal sinus rhythm  Nonspecific T wave abnormality  Prolonged QT  Abnormal ECG  When compared with ECG of 13-DEC-2024 12:17, (unconfirmed)  ST no longer depressed in Lateral leads  Nonspecific T wave abnormality, improved in Lateral leads  QT has lengthened  See ED provider note for full interpretation and clinical correlation  Confirmed by Martina Petersen ([id number]) on 12/14/2024 2:22:17 PM        Physical exam:  General:     Well appearing  HENT:      Head: Normocephalic and atraumatic.      Mouth: Mucous membranes are moist.   Eyes:      Pupils are equal, round, and reactive to light.   Cardiovascular:      Normal rate and regular rhythm. Normal S1, S2.  No murmurs, clicks, gallops.   Pulmonary:      Crackles in bilateral lung fields.  No wheezing or rhonchi heard.  [place].   Abdominal:       Bowel sounds are normal.      Abdomen is soft, nontender, nondistended  Skin:      No lesions seen  Musculoskeletal:         Extremities: +2 pitting  edema bilateral lower extremities present. No deformities with no abnormal range of motion     Neck supple.   Neurological:      Mental Status: He is alert and oriented X3     CN II-XII intact.  No focal neurologic deficits appreciated.     Relevant Results               Assessment/Plan                  Assessment & Plan  Hypokalemia    1.  Acute on chronic kidney disease stage III: The patient has significant volume overload associated with this.  Nephrology is consulted.  They have started the patient on IV Lasix and has since had good urine output.  Continue monitoring urine output and BMPs.  Will need to keep a close eye on electrolytes while he is on IV diuretics considering he is already having issues with maintaining his potassium and calcium.    2.  Hypocalcemia: Continue calcium replacement with Tums 1000 mg 3 times daily.  Continue calcitriol.  Continue following calcium levels.    3.  Hypokalemia: Continue monitoring BMPs and replace potassium as needed.    4.  Anasarca    5.  Anemia of chronic disease: Hemoglobin 6.9 today.  Will give 1 unit packed red blood cells at this time.  Patient started on ferrous sulfate.    6.  Hypertension: Continue hydralazine and nifedipine.  Will follow-up with nephrology in regards to further agents.    7.  Diabetes: Continue Jardiance.    Disposition: Continue IV Lasix.  Give 1 unit packed red blood cells.  Monitor urine output and BMPs.  Follow-up with nephrology recommendations.  Anticipate patient remain hospitalized for additional 24 to 48 hours.  He is at high risk for continued decompensation requiring renal replacement therapy.  Discharge disposition is likely to SNF once medically stable.              Adam Kinney DO

## 2024-12-19 NOTE — PROGRESS NOTES
Pt reviewed in care rounds this morning. Pt not medically ready for discharge. ADOD is 24-48 hours.  SW met w/ Pt at bedside.  SW advised that [facility] is accepting Pt for placement and Pt confirmed agreement w/ plan to discharge to [facility] for rehab stay at SNF. SW requested precert from DSC team and precert is approved through 12/23. SW to follow.      12/19/24 1454   Discharge Planning   Expected Discharge Disposition SNF  ([facility] precert approved through 12/23)   RoundTrip coordination needed? Yes   Has discharge transport been arranged? No   What day is the transport expected? 12/21/24   Stroke Family Assessment   Stroke Family Assessment Needed No   Intensity of Service   Intensity of Service 0-30 min

## 2024-12-20 VITALS
SYSTOLIC BLOOD PRESSURE: 150 MMHG | HEART RATE: 88 BPM | DIASTOLIC BLOOD PRESSURE: 70 MMHG | TEMPERATURE: 96.8 F | OXYGEN SATURATION: 89 % | RESPIRATION RATE: 18 BRPM | HEIGHT: 69 IN | WEIGHT: 167.99 LBS | BODY MASS INDEX: 24.88 KG/M2

## 2024-12-20 DIAGNOSIS — N18.31 STAGE 3A CHRONIC KIDNEY DISEASE (MULTI): ICD-10-CM

## 2024-12-20 PROBLEM — E87.6 HYPOKALEMIA: Status: RESOLVED | Noted: 2024-12-13 | Resolved: 2024-12-20

## 2024-12-20 LAB
ALBUMIN SERPL BCP-MCNC: 2.9 G/DL (ref 3.4–5)
ALP SERPL-CCNC: 249 U/L (ref 33–136)
ALT SERPL W P-5'-P-CCNC: 31 U/L (ref 10–52)
ANION GAP SERPL CALC-SCNC: 13 MMOL/L (ref 10–20)
AST SERPL W P-5'-P-CCNC: 38 U/L (ref 9–39)
BASOPHILS # BLD AUTO: 0.08 X10*3/UL (ref 0–0.1)
BASOPHILS NFR BLD AUTO: 1.1 %
BILIRUB SERPL-MCNC: 0.3 MG/DL (ref 0–1.2)
BUN SERPL-MCNC: 35 MG/DL (ref 6–23)
CA-I BLD-SCNC: 0.78 MMOL/L (ref 1.1–1.33)
CALCIUM SERPL-MCNC: 6.2 MG/DL (ref 8.6–10.3)
CHLORIDE SERPL-SCNC: 93 MMOL/L (ref 98–107)
CO2 SERPL-SCNC: 26 MMOL/L (ref 21–32)
CREAT SERPL-MCNC: 2.71 MG/DL (ref 0.5–1.3)
EGFRCR SERPLBLD CKD-EPI 2021: 24 ML/MIN/1.73M*2
EOSINOPHIL # BLD AUTO: 0.22 X10*3/UL (ref 0–0.4)
EOSINOPHIL NFR BLD AUTO: 3.1 %
ERYTHROCYTE [DISTWIDTH] IN BLOOD BY AUTOMATED COUNT: 16.4 % (ref 11.5–14.5)
GLUCOSE SERPL-MCNC: 90 MG/DL (ref 74–99)
HCT VFR BLD AUTO: 21.8 % (ref 41–52)
HGB BLD-MCNC: 7.2 G/DL (ref 13.5–17.5)
HOLD SPECIMEN: NORMAL
IMM GRANULOCYTES # BLD AUTO: 0.01 X10*3/UL (ref 0–0.5)
IMM GRANULOCYTES NFR BLD AUTO: 0.1 % (ref 0–0.9)
LYMPHOCYTES # BLD AUTO: 0.5 X10*3/UL (ref 0.8–3)
LYMPHOCYTES NFR BLD AUTO: 7 %
MAGNESIUM SERPL-MCNC: 1.7 MG/DL (ref 1.6–2.4)
MCH RBC QN AUTO: 26 PG (ref 26–34)
MCHC RBC AUTO-ENTMCNC: 33 G/DL (ref 32–36)
MCV RBC AUTO: 79 FL (ref 80–100)
MONOCYTES # BLD AUTO: 0.91 X10*3/UL (ref 0.05–0.8)
MONOCYTES NFR BLD AUTO: 12.8 %
NEUTROPHILS # BLD AUTO: 5.4 X10*3/UL (ref 1.6–5.5)
NEUTROPHILS NFR BLD AUTO: 75.9 %
NRBC BLD-RTO: 0 /100 WBCS (ref 0–0)
PLATELET # BLD AUTO: 261 X10*3/UL (ref 150–450)
POTASSIUM SERPL-SCNC: 3.3 MMOL/L (ref 3.5–5.3)
PROT SERPL-MCNC: 5.6 G/DL (ref 6.4–8.2)
RBC # BLD AUTO: 2.77 X10*6/UL (ref 4.5–5.9)
SODIUM SERPL-SCNC: 129 MMOL/L (ref 136–145)
WBC # BLD AUTO: 7.1 X10*3/UL (ref 4.4–11.3)

## 2024-12-20 PROCEDURE — 2500000004 HC RX 250 GENERAL PHARMACY W/ HCPCS (ALT 636 FOR OP/ED): Performed by: HOSPITALIST

## 2024-12-20 PROCEDURE — 2500000001 HC RX 250 WO HCPCS SELF ADMINISTERED DRUGS (ALT 637 FOR MEDICARE OP): Performed by: STUDENT IN AN ORGANIZED HEALTH CARE EDUCATION/TRAINING PROGRAM

## 2024-12-20 PROCEDURE — 85025 COMPLETE CBC W/AUTO DIFF WBC: CPT | Performed by: STUDENT IN AN ORGANIZED HEALTH CARE EDUCATION/TRAINING PROGRAM

## 2024-12-20 PROCEDURE — 99239 HOSP IP/OBS DSCHRG MGMT >30: CPT | Performed by: STUDENT IN AN ORGANIZED HEALTH CARE EDUCATION/TRAINING PROGRAM

## 2024-12-20 PROCEDURE — 80053 COMPREHEN METABOLIC PANEL: CPT | Performed by: INTERNAL MEDICINE

## 2024-12-20 PROCEDURE — 83735 ASSAY OF MAGNESIUM: CPT | Performed by: STUDENT IN AN ORGANIZED HEALTH CARE EDUCATION/TRAINING PROGRAM

## 2024-12-20 PROCEDURE — 36415 COLL VENOUS BLD VENIPUNCTURE: CPT | Performed by: STUDENT IN AN ORGANIZED HEALTH CARE EDUCATION/TRAINING PROGRAM

## 2024-12-20 PROCEDURE — 2500000001 HC RX 250 WO HCPCS SELF ADMINISTERED DRUGS (ALT 637 FOR MEDICARE OP): Performed by: HOSPITALIST

## 2024-12-20 PROCEDURE — 2500000004 HC RX 250 GENERAL PHARMACY W/ HCPCS (ALT 636 FOR OP/ED): Performed by: INTERNAL MEDICINE

## 2024-12-20 PROCEDURE — 94761 N-INVAS EAR/PLS OXIMETRY MLT: CPT

## 2024-12-20 PROCEDURE — 82330 ASSAY OF CALCIUM: CPT | Performed by: STUDENT IN AN ORGANIZED HEALTH CARE EDUCATION/TRAINING PROGRAM

## 2024-12-20 PROCEDURE — 2500000001 HC RX 250 WO HCPCS SELF ADMINISTERED DRUGS (ALT 637 FOR MEDICARE OP): Performed by: INTERNAL MEDICINE

## 2024-12-20 PROCEDURE — 2500000002 HC RX 250 W HCPCS SELF ADMINISTERED DRUGS (ALT 637 FOR MEDICARE OP, ALT 636 FOR OP/ED): Performed by: HOSPITALIST

## 2024-12-20 PROCEDURE — 99233 SBSQ HOSP IP/OBS HIGH 50: CPT | Performed by: INTERNAL MEDICINE

## 2024-12-20 RX ORDER — POTASSIUM CHLORIDE 1.5 G/1.58G
40 POWDER, FOR SOLUTION ORAL ONCE
Status: COMPLETED | OUTPATIENT
Start: 2024-12-20 | End: 2024-12-20

## 2024-12-20 RX ORDER — LANOLIN ALCOHOL/MO/W.PET/CERES
400 CREAM (GRAM) TOPICAL DAILY
Start: 2024-12-21

## 2024-12-20 RX ORDER — IPRATROPIUM BROMIDE AND ALBUTEROL SULFATE 2.5; .5 MG/3ML; MG/3ML
3 SOLUTION RESPIRATORY (INHALATION) EVERY 2 HOUR PRN
Start: 2024-12-20

## 2024-12-20 RX ORDER — CALCITRIOL 0.5 UG/1
0.5 CAPSULE ORAL 2 TIMES DAILY
Start: 2024-12-20

## 2024-12-20 RX ORDER — GUAIFENESIN 600 MG/1
600 TABLET, EXTENDED RELEASE ORAL 2 TIMES DAILY PRN
Start: 2024-12-20

## 2024-12-20 RX ORDER — CALCIUM CARBONATE 200(500)MG
1000 TABLET,CHEWABLE ORAL 3 TIMES DAILY
Start: 2024-12-20

## 2024-12-20 RX ORDER — POTASSIUM CHLORIDE 750 MG/1
10 TABLET, FILM COATED, EXTENDED RELEASE ORAL 2 TIMES DAILY
Start: 2024-12-20

## 2024-12-20 RX ORDER — BUMETANIDE 1 MG/1
1 TABLET ORAL
Start: 2024-12-20

## 2024-12-20 RX ORDER — HYDRALAZINE HYDROCHLORIDE 100 MG/1
100 TABLET, FILM COATED ORAL 3 TIMES DAILY
Start: 2024-12-20

## 2024-12-20 RX ORDER — BUMETANIDE 1 MG/1
1 TABLET ORAL
Status: DISCONTINUED | OUTPATIENT
Start: 2024-12-20 | End: 2024-12-20 | Stop reason: HOSPADM

## 2024-12-20 RX ORDER — DOXAZOSIN 2 MG/1
4 TABLET ORAL EVERY 12 HOURS SCHEDULED
Start: 2024-12-20

## 2024-12-20 ASSESSMENT — COGNITIVE AND FUNCTIONAL STATUS - GENERAL
PERSONAL GROOMING: A LITTLE
MOBILITY SCORE: 18
CLIMB 3 TO 5 STEPS WITH RAILING: A LITTLE
DRESSING REGULAR LOWER BODY CLOTHING: A LITTLE
STANDING UP FROM CHAIR USING ARMS: A LITTLE
MOVING FROM LYING ON BACK TO SITTING ON SIDE OF FLAT BED WITH BEDRAILS: A LITTLE
TOILETING: A LITTLE
DAILY ACTIVITIY SCORE: 19
HELP NEEDED FOR BATHING: A LITTLE
WALKING IN HOSPITAL ROOM: A LITTLE
MOVING TO AND FROM BED TO CHAIR: A LITTLE
TURNING FROM BACK TO SIDE WHILE IN FLAT BAD: A LITTLE
DRESSING REGULAR UPPER BODY CLOTHING: A LITTLE

## 2024-12-20 ASSESSMENT — PAIN SCALES - GENERAL: PAINLEVEL_OUTOF10: 0 - NO PAIN

## 2024-12-20 NOTE — PROGRESS NOTES
"Nutrition Follow Up Assessment:   Nutrition Assessment    Reason for Assessment: Dietitian discretion    Patient is a 74 y.o. male presenting with Acute renal failure, hypokalemia, and hypocalcemia. PMH of CVA, HTN, T2 DM, HLD, stage 3 CKD, hypoparathyroidism, and hypothyroidism. Pt is sitting up in bed at time of visit, expected discharge is today. Denies any GI symptoms or nutrition questions and concerns at this time.    Nutrition History:  Energy Intake: Good > 75 %  Food and Nutrient History: Recent intakes have all been 100%  Food Allergies/Intolerances:  None  GI Symptoms: None  Oral Problems: None     Anthropometrics:  Height: 175.3 cm (5' 9.02\")   Weight: 76.2 kg (167 lb 15.9 oz)   BMI (Calculated): 24.8  IBW/kg (Dietitian Calculated): 72.7 kg     Weight History:   Daily Weight  12/13/24 : 76.2 kg (167 lb 15.9 oz)  11/27/24 : 73.9 kg (163 lb)  08/20/24 : 72.1 kg (159 lb)  12/26/23 : 71.5 kg (157 lb 11.2 oz)  12/06/23 : 73.8 kg (162 lb 9.6 oz)  10/25/23 : 73.3 kg (161 lb 9.6 oz)  10/11/23 : 73 kg (160 lb 15 oz)  09/06/23 : 71.7 kg (158 lb)  06/26/23 : 71.3 kg (157 lb 3.2 oz)  05/22/23 : 72.6 kg (160 lb)     Weight Change %:  Weight History / % Weight Change: Per chart, pt wt appears to fluctuate. This is likely fluid-related.  Significant Weight Loss: No    Nutrition Focused Physical Exam Findings:  Subcutaneous Fat Loss:   Orbital Fat Pads: Mild-Moderate (slight dark circles and slight hollowing)  Buccal Fat Pads: Mild-Moderate (flat cheeks, minimal bounce)  Triceps: Defer  Ribs: Defer  Muscle Wasting:  Temporalis: Mild-Moderate (slight depression)  Pectoralis (Clavicular Region): Mild-Moderate (some protrusion of clavicle)  Deltoid/Trapezius: Defer  Interosseous: Mild-Moderate (slightly depressed area between thumb and forefinger)  Trapezius/Infraspinatus/Supraspinatus (Scapular Region): Defer  Edema:  Edema: +2 mild    Nutrition Significant Labs:  CBC Trend:   Results from last 7 days   Lab Units " 12/20/24  0501 12/19/24  0524 12/18/24  0427 12/17/24 0427   WBC AUTO x10*3/uL 7.1 8.7 9.2 11.2   RBC AUTO x10*6/uL 2.77* 2.72* 2.95* 3.02*   HEMOGLOBIN g/dL 7.2* 6.9* 7.5* 7.7*   HEMATOCRIT % 21.8* 20.9* 23.0* 23.6*   MCV fL 79* 77* 78* 78*   PLATELETS AUTO x10*3/uL 261 283 323 349    , Renal Lab Trend:   Results from last 7 days   Lab Units 12/20/24  0501 12/19/24  0524 12/18/24  0427 12/17/24  0427 12/15/24  0436 12/14/24  0427   POTASSIUM mmol/L 3.3* 3.4* 4.0 3.9   < > 3.2*   PHOSPHORUS mg/dL  --   --   --   --   --  4.6   SODIUM mmol/L 129* 130* 128* 128*   < > 132*   MAGNESIUM mg/dL 1.70 1.76  --   --    < > 1.83   EGFR mL/min/1.73m*2 24* 23* 22* 20*   < > 23*   BUN mg/dL 35* 34* 36* 36*   < > 25*   CREATININE mg/dL 2.71* 2.74* 2.94* 3.10*   < > 2.78*    < > = values in this interval not displayed.    , Iron Panel:   Lab Results   Component Value Date    IRON 35 12/14/2024    TIBC 211 (L) 12/14/2024    FERRITIN 268 12/14/2024        Nutrition Specific Medications:  Bumex, Calcitriol, Tums, Jardiance, Lovenox, ferrous sulphate, Synthroid, magnesium oxide, polyethylene glycol, potassium chloride.    I/O:   Last BM Date: 12/20/24; Stool Appearance: Soft, Other (Comment) (unformed with pieces of corn in it) (12/16/24 2206)    Dietary Orders (From admission, onward)       Start     Ordered    12/13/24 1547  Adult diet Cardiac; 70 gm fat; 2 - 3 grams Sodium  Diet effective now        Question Answer Comment   Diet type Cardiac    Fat restriction: 70 gm fat    Sodium restriction: 2 - 3 grams Sodium        12/13/24 1546    12/13/24 1540  May Participate in Room Service  ( ROOM SERVICE MAY PARTICIPATE)  Once        Question:  .  Answer:  Yes    12/13/24 1539                     Estimated Needs:   Total Energy Estimated Needs (kCal):  (8942-4364)  Method for Estimating Needs: 20-25 kcal/kg  Total Protein Estimated Needs (g):  (84-99)  Method for Estimating Needs: 1.1-1.3 g/kg  Total Fluid Estimated Needs (mL): 2289  mL      Nutrition Diagnosis   Malnutrition Diagnosis  Patient has Malnutrition Diagnosis: No    Nutrition Diagnosis  Patient has Nutrition Diagnosis: Yes  Diagnosis Status (1): New  Nutrition Diagnosis 1: Altered nutrition related to laboratory values  Related to (1): kidney and endocrine dysfunction  As Evidenced by (1): diagnosis of hypothryroidism, hypoparathyroidism, and stage 3a CKD       Nutrition Interventions/Recommendations       Nutrition Prescription:  Individualized Nutrition Prescription Provided for : 0843-3962 kcal and  kcal per day to be provided by diet and supplement regimen daily        Nutrition Interventions:   Interventions: Medical food supplement, Meals and snacks  Meals and Snacks: Fat-modified diet, Mineral-modified diet  Goal: Continue current diet  Medical Food Supplement: Commercial beverage  Goal: Continue current supplement      Nutrition Monitoring and Evaluation   Food/Nutrient Related History Monitoring  Monitoring and Evaluation Plan: Amount of food  Amount of Food: Estimated amout of food, Medical food intake  Criteria: >75% of trays consumed; ONS adherence    Body Composition/Growth/Weight History  Monitoring and Evaluation Plan: Weight  Weight: Measured weight, Weight change  Criteria: Wt maintenance    Biochemical Data, Medical Tests and Procedures  Monitoring and Evaluation Plan: Electrolyte/renal panel, Glucose/endocrine profile, Nutritional anemia profile  Electrolyte and Renal Panel: BUN, Calcium, serum, Chloride, Creatinine, Sodium, Potassium, Phosphorus  Criteria: Labs WNL  Glucose/Endocrine Profile: Hemoglobin A1c (HgbA1c), Glucose, fasting, Glucose, casual  Criteria: Labs WNL  Nutritional Anemia Profile: Hematocrit, Hemoglobin  Criteria: Labs WNL    Time Spent (min): 30 minutes

## 2024-12-20 NOTE — PROGRESS NOTES
Pt reviewed in care rounds this morning. Pt medically ready for discharge. SW met w/ Pt at bedside.  SW explained IMM and Pt signed, SW left copy w/ Pt @ bedside. Pt in agreement w/ discharge plan to go to [facility] today. ZAK communicated w/ SNF and per Danii/[facility] facility is able to admit Pt today. ZAK requested DSC attach and send final orders and submit HENS/[id number]. SW updated facility of transportation time. ZAK spoke to Pt's dtr/Nicolasa and advised of discharge/transportation time. ZAK provided information for [facility] for dtr and Pt to follow up regarding long term planning. No further CT/SW assistance needs anticipated.      12/20/24 1043   Discharge Planning   Does the patient need discharge transport arranged? Yes   RoundTrip coordination needed? Yes   Has discharge transport been arranged? No   What day is the transport expected? 12/20/24   Stroke Family Assessment   Stroke Family Assessment Needed No   Intensity of Service   Intensity of Service 0-30 min

## 2024-12-20 NOTE — PROGRESS NOTES
Garcia Haskins is a 74 y.o. male on day 7 of admission presenting with Hypokalemia.    Subjective   Patient seen and examined at the side  He is sitting comfortably at the bedside  His swelling is improving  He had another very good response overnight and had almost 4 L out.  His swelling is now below his knees       Objective          Vitals 24HR  Heart Rate:  [84-99]   Temp:  [36.3 °C (97.3 °F)-37.2 °C (98.9 °F)]   Resp:  [14-20]   BP: (132-198)/(56-73)   SpO2:  [91 %-94 %]     Intake/Output last 3 Shifts:    Intake/Output Summary (Last 24 hours) at 12/20/2024 0830  Last data filed at 12/20/2024 0805  Gross per 24 hour   Intake 1850 ml   Output 3950 ml   Net -2100 ml       Physical Exam  Constitutional:       Appearance: Normal appearance.   HENT:      Head: Normocephalic and atraumatic.      Right Ear: External ear normal.      Left Ear: External ear normal.      Nose: Nose normal.      Mouth/Throat:      Mouth: Mucous membranes are moist.      Pharynx: Oropharynx is clear.   Eyes:      Extraocular Movements: Extraocular movements intact.      Conjunctiva/sclera: Conjunctivae normal.      Pupils: Pupils are equal, round, and reactive to light.   Cardiovascular:      Rate and Rhythm: Normal rate and regular rhythm.   Pulmonary:      Effort: Pulmonary effort is normal.      Breath sounds: Normal breath sounds.   Abdominal:      General: Abdomen is flat.      Palpations: Abdomen is soft.   Musculoskeletal:         General: Swelling present.      Right lower leg: Edema present.      Left lower leg: Edema present.   Skin:     General: Skin is warm and dry.   Neurological:      General: No focal deficit present.      Mental Status: He is alert and oriented to person, place, and time.   Psychiatric:         Mood and Affect: Mood normal.         Behavior: Behavior normal.     Scheduled medications  calcitriol, 0.5 mcg, oral, BID  calcium carbonate, 1,000 mg, oral, TID  clopidogrel, 75 mg, oral, Daily  doxazosin, 2 mg,  oral, q12h RYLIE  empagliflozin, 10 mg, oral, Daily  enoxaparin, 30 mg, subcutaneous, q24h  ferrous sulfate (325 mg ferrous sulfate), 65 mg of iron, oral, Daily  furosemide, 40 mg, intravenous, q12h  hydrALAZINE, 100 mg, oral, TID  ipratropium-albuteroL, 3 mL, nebulization, q4h  levothyroxine, 175 mcg, oral, Nightly  magnesium oxide, 400 mg, oral, Daily  NIFEdipine ER, 90 mg, oral, Daily  polyethylene glycol, 17 g, oral, Daily  potassium chloride CR, 10 mEq, oral, BID      Continuous medications     PRN medications  PRN medications: acetaminophen **OR** acetaminophen **OR** acetaminophen, acetaminophen **OR** acetaminophen **OR** acetaminophen, guaiFENesin, ipratropium-albuteroL, metoprolol, morphine, ondansetron ODT **OR** ondansetron, oxygen, phenoL, sodium chloride      Relevant Results               Assessment/Plan      Assessment & Plan  Hypokalemia    Acute renal failure: Likely intrinsic: ATN  Severe hypocalcemia: Improving  CKD 3 with baseline creatinine about 1.8  Right renal cyst  Atrophic right kidney  Anemia of chronic disease  Type 2 diabetes  Microalbuminuria  Hypertension: Resistant  Hyperlipidemia  Peripheral edema with volume overload and anasarca  Normal anion gap metabolic acidosis  Hyponatremia    Plan:  At this time he is actually showing pretty good signs of improvement and has responded quite nicely to diuretics on the second trial  I am going to change his Lasix to Bumex  He needs to continue calcitriol and calcium supplementation as he is on  The plan is for him to go to rehab  From my standpoint he can be discharged to rehab and I can see him next week in the office for follow-up  He is still having elevated blood pressures  I would continue Cardura as he is on it was just started and we can see how he does over the next few days into next week with the addition of the Cardura as well as getting his fluid balance better             Mauricio Rachel, DO

## 2024-12-20 NOTE — NURSING NOTE
Attempted to call report x2 to [place], phone went to voicemail. Report given to EMT's. IV removed, no complications. Patient is leaving via [facility].

## 2024-12-20 NOTE — CARE PLAN
The patient's goals for the shift include less short of breath.    The clinical goals for the shift include rest at night, have vital signs WNL, and ask for assistance from staff to the restroom.

## 2024-12-20 NOTE — NURSING NOTE
Discharge Note: 12/20/2024 1408 Discharged via stretcher by [facility] to SNF, paperwork packet sent with transporter, personal belongings taken by transporter, no distress noted, no complaints voiced. Magdi BELLA

## 2024-12-20 NOTE — DISCHARGE SUMMARY
Discharge Diagnosis  Hypokalemia    Issues Requiring Follow-Up  GLENIS on CKD stage 3, hypocalcemia, hypokalemia    Discharge Meds     Medication List      START taking these medications     bumetanide 1 mg tablet; Commonly known as: Bumex; Take 1 tablet (1 mg)   by mouth 2 times daily (morning and late afternoon).   calcitriol 0.5 mcg capsule; Commonly known as: Rocaltrol; Take 1 capsule   (0.5 mcg) by mouth 2 times a day.   doxazosin 2 mg tablet; Commonly known as: Cardura; Take 2 tablets (4 mg)   by mouth every 12 hours.   guaiFENesin 600 mg 12 hr tablet; Commonly known as: Mucinex; Take 1   tablet (600 mg) by mouth 2 times a day as needed for cough. Do not crush,   chew, or split.   ipratropium-albuteroL 0.5-2.5 mg/3 mL nebulizer solution; Commonly known   as: Duo-Neb; Take 3 mL by nebulization every 2 hours if needed for   wheezing or shortness of breath.   magnesium oxide 400 mg (241.3 mg magnesium) tablet; Commonly known as:   Mag-Ox; Take 1 tablet (400 mg) by mouth once daily.; Start taking on:   December 21, 2024     CHANGE how you take these medications     calcium carbonate 200 mg calcium chewable tablet; Commonly known as:   Tums; Chew 2 tablets (1,000 mg) 3 times a day.; What changed: medication   strength, how much to take, when to take this, reasons to take this   hydrALAZINE 100 mg tablet; Commonly known as: Apresoline; Take 1 tablet   (100 mg) by mouth 3 times a day.; What changed: medication strength, how   much to take     CONTINUE taking these medications     acetaminophen 500 mg tablet; Commonly known as: Tylenol   clopidogrel 75 mg tablet; Commonly known as: Plavix; Take 1 tablet (75   mg) by mouth once daily.   empagliflozin 10 mg; Commonly known as: Jardiance; Take 1 tablet (10 mg)   by mouth once daily. Last refill, appointment needed   ferrous sulfate (325 mg ferrous sulfate) tablet   levothyroxine 175 mcg tablet; Commonly known as: Synthroid, Levoxyl;   Take 1 tablet (175 mcg) by mouth once  daily.   multivitamin tablet   NIFEdipine ER 90 mg 24 hr tablet; Commonly known as: Adalat CC; Take 1   tablet (90 mg) by mouth once daily.   potassium chloride CR 10 mEq ER tablet; Commonly known as: Klor-Con;   Take 1 tablet (10 mEq) by mouth 2 times a day. Do not crush, chew, or   split.   simvastatin 40 mg tablet; Commonly known as: Zocor; Take 1 tablet (40   mg) by mouth once daily.     STOP taking these medications     omeprazole 20 mg DR capsule; Commonly known as: PriLOSEC       Test Results Pending At Discharge  Pending Labs       No current pending labs.            Hospital Course   74 y.o. male presenting with generalized weakness for a week and tired all the time.  Denies sore throat runny nose sinus or nasal congestion or cough or fever or hemoptysis or shortness of breath or chest pain.  No palpitations or dizziness or worsening leg swelling.  No headache or focal weakness.  Patient had recent labs drawn and sent in by nephrology due to abnormal electrolytes including hypokalemia, hypomagnesemia, hypocalcemia as well GLENIS.  Poor appetite but no alarming weight loss but decreased p.o. intake.  Limited mobility.  Somewhat decline.  Denies having any history of malignancy.  No alarming weight loss.  Imaging showing possibility of pneumonia in right lower lobe and leukocytosis.  No productive cough or sputum production.    There is glenoid rim sclerosis and cystic change with some possible cortical thickening and coarsening of trabecula.  Concern of possible Paget's disease.  Denies any specific bone pains but overall aches.  Alk phosphatase elevated.  No acute fracture or dislocation.    During hospital stay, patient would have very slow progress and initially fail a lasix trial.  Patient would be started on calcitriol and scheduled magnesium to address electrolyte issues with improvement of levels.  He would have second lasix trial due to continue renal dysfunction and swelling.  He would have significant  diuresis on IV lasix 40mg BID with improvement of breathing and swelling.  He would not require dialysis during hospitalization.   Patient would be cleared for discharge by nephrology and recommended to continue oral bumex for diuresis.  He would also be started on cardura for uncontrolled hypertension.  He will continue calcitriol, potassium, and magnesium scheduled outpatient.  PT/OT would recommend discharge to SNF.  Patient and family would agree with this.  He will have repeat BMP, ionized calcium, magnesium outpatient in 1 week.   Follow up with nephrology in 1-2 weeks  Follow up with PCP in one week.  Vital signs were stable at discharge.  Patient agreed to discharge plan.    35 minutes total was spent on discharge planning including chart review, medication ordering, and discussion with family and hospital staff.       Pertinent Physical Exam At Time of Discharge  General:     Well appearing  HENT:      Head: Normocephalic and atraumatic.      Mouth: Mucous membranes are moist.   Eyes:      Pupils are equal, round, and reactive to light.   Cardiovascular:      Normal rate and regular rhythm. Normal S1, S2.  No murmurs, clicks, gallops.   Pulmonary:      Crackles in bilateral lung fields.  No wheezing or rhonchi heard.  [place].   Abdominal:       Bowel sounds are normal.      Abdomen is soft, nontender, nondistended  Skin:      No lesions seen  Musculoskeletal:         Extremities: +2 pitting edema bilateral lower extremities present. No deformities with no abnormal range of motion     Neck supple.   Neurological:      Mental Status: He is alert and oriented X3     CN II-XII intact.  No focal neurologic deficits appreciated.    Outpatient Follow-Up  Future Appointments   Date Time Provider Department Center   12/26/2024 11:00 AM SANJANA Robb-CNP, DNP [id number] [facility]   12/26/2024  2:20 PM Ben Dias MD [id number] [facility]         Adam Kinney DO

## 2024-12-23 ENCOUNTER — DOCUMENTATION (OUTPATIENT)
Dept: CASE MANAGEMENT | Facility: HOSPITAL | Age: 74
End: 2024-12-23
Payer: MEDICARE

## 2024-12-23 NOTE — DOCUMENTATION CLARIFICATION NOTE
"    PATIENT:               BURAK GARCIA  ACCT #:                  [id number]  MRN:                       [id number]  :                       1950  ADMIT DATE:       2024 12:11 PM  DISCH DATE:        2024 1:45 PM  RESPONDING PROVIDER #:        [id number]          PROVIDER RESPONSE TEXT:    Pneumonia ruled in for this admission    CDI QUERY TEXT:    Clarification    Instruction:    Based on your assessment of the patient and the clinical information, please provide the requested documentation by clicking on the appropriate radio button and enter any additional information if prompted.    Question: Please further clarify the diagnosis of Pneumonia as    When answering this query, please exercise your independent professional judgment. The fact that a question is being asked, does not imply that any particular answer is desired or expected.    The patient's clinical indicators include:  Clinical Information:  * 74 year old male to ED with weakness - sore throat and nasal congestion    Clinical Indicators:  *   H&P notes \"Questionable right lower lobe pneumonia\"  *  Labs WBC 19.6 - procalcitonin 0.89  *  CXR \"New sizable right basilar airspace opacity with the adjacent effusion. This could be pneumonia or possibly some asymmetric edema\"  *  progress note \"On ceftriaxone and azithromycin for pneumonia\"    Treatment:  *  - 12/15 Azithromycin 500 mg daily  * -  Rocephin 2 gm daily - d/c'd     Risk Factors:  * Abnormal CXR  * Elevated WBC  Options provided:  -- Pneumonia ruled out after workup  -- Pneumonia ruled in for this admission  -- Other - I will add my own diagnosis  -- Refer to Clinical Documentation Reviewer    Query created by: Deandra Reyez on 2024 8:09 AM      Electronically signed by:  VANESSA PEÑA MD 2024 7:08 AM          "

## 2024-12-26 ENCOUNTER — APPOINTMENT (OUTPATIENT)
Dept: PRIMARY CARE | Facility: CLINIC | Age: 74
End: 2024-12-26
Payer: MEDICARE

## 2024-12-26 ENCOUNTER — OFFICE VISIT (OUTPATIENT)
Dept: NEPHROLOGY | Facility: CLINIC | Age: 74
End: 2024-12-26
Payer: MEDICARE

## 2024-12-26 VITALS
HEART RATE: 90 BPM | DIASTOLIC BLOOD PRESSURE: 64 MMHG | BODY MASS INDEX: 25.62 KG/M2 | WEIGHT: 173 LBS | SYSTOLIC BLOOD PRESSURE: 162 MMHG | HEIGHT: 69 IN

## 2024-12-26 VITALS — OXYGEN SATURATION: 96 % | SYSTOLIC BLOOD PRESSURE: 130 MMHG | DIASTOLIC BLOOD PRESSURE: 50 MMHG | HEART RATE: 84 BPM

## 2024-12-26 DIAGNOSIS — E83.52 HYPERCALCEMIA: ICD-10-CM

## 2024-12-26 DIAGNOSIS — E11.65 TYPE 2 DIABETES MELLITUS WITH HYPERGLYCEMIA, WITHOUT LONG-TERM CURRENT USE OF INSULIN: Primary | ICD-10-CM

## 2024-12-26 DIAGNOSIS — D63.8 ANEMIA OF CHRONIC DISORDER: ICD-10-CM

## 2024-12-26 DIAGNOSIS — G62.9 NEUROPATHY: ICD-10-CM

## 2024-12-26 DIAGNOSIS — Z23 ENCOUNTER FOR IMMUNIZATION: ICD-10-CM

## 2024-12-26 DIAGNOSIS — E11.65 TYPE 2 DIABETES MELLITUS WITH HYPERGLYCEMIA, WITHOUT LONG-TERM CURRENT USE OF INSULIN: ICD-10-CM

## 2024-12-26 DIAGNOSIS — N18.31 STAGE 3A CHRONIC KIDNEY DISEASE (MULTI): Primary | ICD-10-CM

## 2024-12-26 DIAGNOSIS — E03.9 ACQUIRED HYPOTHYROIDISM: ICD-10-CM

## 2024-12-26 DIAGNOSIS — N18.31 STAGE 3A CHRONIC KIDNEY DISEASE (MULTI): ICD-10-CM

## 2024-12-26 DIAGNOSIS — E20.9 HYPOPARATHYROIDISM, UNSPECIFIED HYPOPARATHYROIDISM TYPE (MULTI): ICD-10-CM

## 2024-12-26 PROCEDURE — 3061F NEG MICROALBUMINURIA REV: CPT | Performed by: FAMILY MEDICINE

## 2024-12-26 PROCEDURE — G0008 ADMIN INFLUENZA VIRUS VAC: HCPCS | Performed by: FAMILY MEDICINE

## 2024-12-26 PROCEDURE — 90656 IIV3 VACC NO PRSV 0.5 ML IM: CPT | Performed by: FAMILY MEDICINE

## 2024-12-26 PROCEDURE — 3044F HG A1C LEVEL LT 7.0%: CPT | Performed by: CLINICAL NURSE SPECIALIST

## 2024-12-26 PROCEDURE — 1111F DSCHRG MED/CURRENT MED MERGE: CPT | Performed by: FAMILY MEDICINE

## 2024-12-26 PROCEDURE — 3078F DIAST BP <80 MM HG: CPT | Performed by: CLINICAL NURSE SPECIALIST

## 2024-12-26 PROCEDURE — 3078F DIAST BP <80 MM HG: CPT | Performed by: FAMILY MEDICINE

## 2024-12-26 PROCEDURE — 1159F MED LIST DOCD IN RCRD: CPT | Performed by: FAMILY MEDICINE

## 2024-12-26 PROCEDURE — 99213 OFFICE O/P EST LOW 20 MIN: CPT | Performed by: CLINICAL NURSE SPECIALIST

## 2024-12-26 PROCEDURE — 1036F TOBACCO NON-USER: CPT | Performed by: FAMILY MEDICINE

## 2024-12-26 PROCEDURE — 3077F SYST BP >= 140 MM HG: CPT | Performed by: CLINICAL NURSE SPECIALIST

## 2024-12-26 PROCEDURE — 1036F TOBACCO NON-USER: CPT | Performed by: CLINICAL NURSE SPECIALIST

## 2024-12-26 PROCEDURE — 3044F HG A1C LEVEL LT 7.0%: CPT | Performed by: FAMILY MEDICINE

## 2024-12-26 PROCEDURE — 1160F RVW MEDS BY RX/DR IN RCRD: CPT | Performed by: CLINICAL NURSE SPECIALIST

## 2024-12-26 PROCEDURE — 3075F SYST BP GE 130 - 139MM HG: CPT | Performed by: FAMILY MEDICINE

## 2024-12-26 PROCEDURE — 99214 OFFICE O/P EST MOD 30 MIN: CPT | Performed by: FAMILY MEDICINE

## 2024-12-26 PROCEDURE — 1111F DSCHRG MED/CURRENT MED MERGE: CPT | Performed by: CLINICAL NURSE SPECIALIST

## 2024-12-26 PROCEDURE — 1159F MED LIST DOCD IN RCRD: CPT | Performed by: CLINICAL NURSE SPECIALIST

## 2024-12-26 PROCEDURE — 3061F NEG MICROALBUMINURIA REV: CPT | Performed by: CLINICAL NURSE SPECIALIST

## 2024-12-26 RX ORDER — GABAPENTIN 300 MG/1
300 CAPSULE ORAL NIGHTLY
Qty: 30 CAPSULE | Refills: 2 | Status: SHIPPED | OUTPATIENT
Start: 2024-12-26 | End: 2025-12-26

## 2024-12-26 ASSESSMENT — ENCOUNTER SYMPTOMS
WEAKNESS: 1
PSYCHIATRIC NEGATIVE: 1
ENDOCRINE NEGATIVE: 1
MUSCULOSKELETAL NEGATIVE: 1
GASTROINTESTINAL NEGATIVE: 1
RESPIRATORY NEGATIVE: 1
FATIGUE: 1

## 2024-12-26 NOTE — ASSESSMENT & PLAN NOTE
Pressure is slightly high in the office today at 162/64, I am unsure what medications he is on at this time as we did not get a medication list, will be getting those, may increase his Cardura which I believe he was discharged on from the hospital we will wait for the final list

## 2024-12-26 NOTE — PROGRESS NOTES
Subjective   Patient ID: Garcia Haskins is a 74 y.o. male who presents for 4 month follow up (DM, Hyperparathyroidism, HTN, 3A Chronic Kidney Disease).    HPI   Ckd  4      followed by nephrology  Dr Rachel     egfr = 24 and slowly improving     Was hospitalized  after Dr Rachel sent to hosp      Did not need HD     Was hpyokalemic and legs swollen responded to lasix     [facility] [place]      Plans to go back home  or [facility]      Uses walker fell over 1 month ago      Is getting compression socks        HTN  borderline control followed by renal      Hydralazine has been increased to 100 mg tid      PVD   2022 PAD test borderline/normal    patient is already on Clopidogrel and statin therapy.      Patient is a diabetic he is on Metformin   a1c   August 2024= 6.7   Checks BG one per week  was checked this wekk and 140  last eye exam more than 3 yr ago      Former smoker quit 2002   No longer going to [facility]     H/o cva on plavix     Unable to tolerate asa gi ulcer    No melena but with iron is dark     In blood in the stool       Recheck 130 50     Review of Systems    Objective   /50 (BP Location: Right arm, Patient Position: Sitting)   Pulse 84   SpO2 96%     Physical Exam  Vitals reviewed.   Constitutional:       Appearance: Normal appearance.   HENT:      Head: Normocephalic and atraumatic.   Eyes:      Conjunctiva/sclera: Conjunctivae normal.   Cardiovascular:      Rate and Rhythm: Normal rate and regular rhythm.   Pulmonary:      Effort: Pulmonary effort is normal.      Breath sounds: Normal breath sounds.   Musculoskeletal:      Cervical back: Neck supple.      Right lower leg: No edema.      Left lower leg: No edema.      Comments: 1 + prieto      Skin:     General: Skin is warm and dry.   Neurological:      General: No focal deficit present.      Mental Status: He is alert and oriented to person, place, and time.      Sensory: Sensory deficit present.      Motor: Weakness  present.   Psychiatric:         Mood and Affect: Mood normal.         Behavior: Behavior normal.         Thought Content: Thought content normal.         Judgment: Judgment normal.         Assessment/Plan   Diagnoses and all orders for this visit:  Type 2 diabetes mellitus with hyperglycemia, without long-term current use of insulin  -     Hemoglobin A1C; Future  Acquired hypothyroidism  Anemia of chronic disorder  Stage 3a chronic kidney disease (Multi)  Neuropathy  -     gabapentin (Neurontin) 300 mg capsule; Take 1 capsule (300 mg) by mouth once daily at bedtime.

## 2024-12-26 NOTE — PROGRESS NOTES
Subjective   Patient ID: Garcia Haskins is a 74 y.o. male who presents for No chief complaint on file..  Patient being seen post follow-up from discharge from the hospital secondary to acute kidney injury with hypokalemia    Labs were not drawn prior to his appointment  We will need these in order to complete his final adult  He is complaining of fatigue  Continues to have some lower extremity edema however it has improved  Has no difficulties voiding  Is not working much with physical therapy at this time  Feels like he is weaker than he was before                Review of Systems   Constitutional:  Positive for fatigue.   Respiratory: Negative.     Cardiovascular:  Positive for leg swelling.   Gastrointestinal: Negative.    Endocrine: Negative.    Genitourinary: Negative.    Musculoskeletal: Negative.    Skin: Negative.    Neurological:  Positive for weakness.   Psychiatric/Behavioral: Negative.         Objective   Physical Exam  Musculoskeletal:      Right lower leg: Edema (+1 below the knees) present.      Left lower leg: Edema (+1 below the knees) present.         Assessment/Plan   Problem List Items Addressed This Visit             ICD-10-CM    Type 2 diabetes mellitus with hyperglycemia, without long-term current use of insulin E11.65    CKD (chronic kidney disease) - Primary N18.9     Awaiting lab results         Relevant Orders    Follow Up In Nephrology    CBC    Comprehensive metabolic panel    Hypoparathyroidism E20.9     Other Visit Diagnoses         Codes    Hypercalcemia     E83.52    Relevant Orders    Follow Up In Nephrology    Comprehensive metabolic panel          Acute renal failure: Likely intrinsic: ATN  Severe hypocalcemia: Improving  CKD 3 with baseline creatinine about 1.8  Right renal cyst  Atrophic right kidney  Anemia of chronic disease  Type 2 diabetes  Microalbuminuria  Hypertension: Resistant  Hyperlipidemia  Peripheral edema with volume overload and anasarca  Normal anion gap  metabolic acidosis  Hyponatremia   At this time I am waiting his lab results  I will need these before I make further recommendations  His edema is fairly well-controlled  Will need to see what his calcium and sodium is like  Will plan on follow-up in 1 month with lab work prior to his appointment  May also need to increase his antihypertensives depending on what he is on once I receive his labs    SURYA Robb, SONIYA 12/26/24 11:22 AM

## 2025-01-07 ENCOUNTER — PATIENT OUTREACH (OUTPATIENT)
Dept: PRIMARY CARE | Facility: CLINIC | Age: 75
End: 2025-01-07
Payer: MEDICARE

## 2025-01-07 NOTE — PROGRESS NOTES
Discharge Facility: [facility]  Discharge Diagnosis: Hypokalemia  Admission Date: 12/20/2024  Discharge Date: 1/3/2025    PCP Appointment Date: 1/16/2024  Specialist Appointment Date:   -nephrology 1/22/2025    Hospital Encounter and Summary Linked: Yes    Hospital admission [facility] 12/13/2024-12/20/2024    Two attempts were made to reach patient within two business days after discharge. Voicemail left with contact information for patient to call back with any non-emergent questions or concerns. Patient is scheduled for a hospital follow up on 1/16/2025 with PCP.

## 2025-01-15 ENCOUNTER — LAB (OUTPATIENT)
Dept: LAB | Facility: LAB | Age: 75
End: 2025-01-15
Payer: MEDICARE

## 2025-01-15 DIAGNOSIS — N17.9 ACUTE KIDNEY INJURY (CMS-HCC): ICD-10-CM

## 2025-01-15 DIAGNOSIS — E83.51 HYPOCALCEMIA: ICD-10-CM

## 2025-01-15 DIAGNOSIS — N18.31 STAGE 3A CHRONIC KIDNEY DISEASE (MULTI): ICD-10-CM

## 2025-01-15 DIAGNOSIS — E83.52 HYPERCALCEMIA: ICD-10-CM

## 2025-01-15 DIAGNOSIS — E83.42 HYPOMAGNESEMIA: ICD-10-CM

## 2025-01-15 LAB
ALBUMIN SERPL BCP-MCNC: 3.7 G/DL (ref 3.4–5)
ALP SERPL-CCNC: 219 U/L (ref 33–136)
ALT SERPL W P-5'-P-CCNC: 67 U/L (ref 10–52)
ANION GAP SERPL CALC-SCNC: 15 MMOL/L (ref 10–20)
AST SERPL W P-5'-P-CCNC: 107 U/L (ref 9–39)
BILIRUB SERPL-MCNC: 0.5 MG/DL (ref 0–1.2)
BUN SERPL-MCNC: 52 MG/DL (ref 6–23)
CALCIUM SERPL-MCNC: 9.3 MG/DL (ref 8.6–10.3)
CHLORIDE SERPL-SCNC: 96 MMOL/L (ref 98–107)
CO2 SERPL-SCNC: 28 MMOL/L (ref 21–32)
CREAT SERPL-MCNC: 4.18 MG/DL (ref 0.5–1.3)
EGFRCR SERPLBLD CKD-EPI 2021: 14 ML/MIN/1.73M*2
ERYTHROCYTE [DISTWIDTH] IN BLOOD BY AUTOMATED COUNT: 16 % (ref 11.5–14.5)
GLUCOSE SERPL-MCNC: 92 MG/DL (ref 74–99)
HCT VFR BLD AUTO: 24.2 % (ref 41–52)
HGB BLD-MCNC: 7.8 G/DL (ref 13.5–17.5)
MAGNESIUM SERPL-MCNC: 1.81 MG/DL (ref 1.6–2.4)
MCH RBC QN AUTO: 26.3 PG (ref 26–34)
MCHC RBC AUTO-ENTMCNC: 32.2 G/DL (ref 32–36)
MCV RBC AUTO: 82 FL (ref 80–100)
NRBC BLD-RTO: 0 /100 WBCS (ref 0–0)
PLATELET # BLD AUTO: 311 X10*3/UL (ref 150–450)
POTASSIUM SERPL-SCNC: 4.4 MMOL/L (ref 3.5–5.3)
PROT SERPL-MCNC: 6.3 G/DL (ref 6.4–8.2)
RBC # BLD AUTO: 2.97 X10*6/UL (ref 4.5–5.9)
SODIUM SERPL-SCNC: 135 MMOL/L (ref 136–145)
WBC # BLD AUTO: 5.4 X10*3/UL (ref 4.4–11.3)

## 2025-01-15 PROCEDURE — 83735 ASSAY OF MAGNESIUM: CPT

## 2025-01-15 PROCEDURE — 82330 ASSAY OF CALCIUM: CPT

## 2025-01-15 PROCEDURE — 85027 COMPLETE CBC AUTOMATED: CPT

## 2025-01-15 PROCEDURE — 80053 COMPREHEN METABOLIC PANEL: CPT

## 2025-01-16 ENCOUNTER — OFFICE VISIT (OUTPATIENT)
Dept: NEPHROLOGY | Facility: CLINIC | Age: 75
End: 2025-01-16
Payer: MEDICARE

## 2025-01-16 ENCOUNTER — APPOINTMENT (OUTPATIENT)
Dept: PRIMARY CARE | Facility: CLINIC | Age: 75
End: 2025-01-16
Payer: MEDICARE

## 2025-01-16 VITALS
DIASTOLIC BLOOD PRESSURE: 56 MMHG | WEIGHT: 148.2 LBS | HEIGHT: 69 IN | OXYGEN SATURATION: 95 % | BODY MASS INDEX: 21.95 KG/M2 | SYSTOLIC BLOOD PRESSURE: 122 MMHG | HEART RATE: 63 BPM

## 2025-01-16 VITALS
HEIGHT: 69 IN | HEART RATE: 88 BPM | WEIGHT: 151.2 LBS | BODY MASS INDEX: 22.39 KG/M2 | DIASTOLIC BLOOD PRESSURE: 58 MMHG | SYSTOLIC BLOOD PRESSURE: 124 MMHG

## 2025-01-16 DIAGNOSIS — E83.52 HYPERCALCEMIA: ICD-10-CM

## 2025-01-16 DIAGNOSIS — N18.31 STAGE 3A CHRONIC KIDNEY DISEASE (MULTI): Primary | ICD-10-CM

## 2025-01-16 DIAGNOSIS — D63.8 ANEMIA OF CHRONIC DISORDER: ICD-10-CM

## 2025-01-16 DIAGNOSIS — N18.5 CHRONIC KIDNEY DISEASE, STAGE 5 (MULTI): Primary | ICD-10-CM

## 2025-01-16 DIAGNOSIS — N18.31 STAGE 3A CHRONIC KIDNEY DISEASE (MULTI): ICD-10-CM

## 2025-01-16 DIAGNOSIS — N17.9 ACUTE KIDNEY INJURY (CMS-HCC): ICD-10-CM

## 2025-01-16 DIAGNOSIS — I10 PRIMARY HYPERTENSION: ICD-10-CM

## 2025-01-16 LAB — CA-I BLD-SCNC: 1.15 MMOL/L (ref 1.1–1.33)

## 2025-01-16 PROCEDURE — 1111F DSCHRG MED/CURRENT MED MERGE: CPT | Performed by: CLINICAL NURSE SPECIALIST

## 2025-01-16 PROCEDURE — 1159F MED LIST DOCD IN RCRD: CPT | Performed by: CLINICAL NURSE SPECIALIST

## 2025-01-16 PROCEDURE — 99495 TRANSJ CARE MGMT MOD F2F 14D: CPT

## 2025-01-16 PROCEDURE — 1036F TOBACCO NON-USER: CPT

## 2025-01-16 PROCEDURE — 3078F DIAST BP <80 MM HG: CPT

## 2025-01-16 PROCEDURE — 1160F RVW MEDS BY RX/DR IN RCRD: CPT | Performed by: CLINICAL NURSE SPECIALIST

## 2025-01-16 PROCEDURE — 1036F TOBACCO NON-USER: CPT | Performed by: CLINICAL NURSE SPECIALIST

## 2025-01-16 PROCEDURE — 1160F RVW MEDS BY RX/DR IN RCRD: CPT

## 2025-01-16 PROCEDURE — 3074F SYST BP LT 130 MM HG: CPT

## 2025-01-16 PROCEDURE — 3078F DIAST BP <80 MM HG: CPT | Performed by: CLINICAL NURSE SPECIALIST

## 2025-01-16 PROCEDURE — 1111F DSCHRG MED/CURRENT MED MERGE: CPT

## 2025-01-16 PROCEDURE — 99214 OFFICE O/P EST MOD 30 MIN: CPT | Performed by: CLINICAL NURSE SPECIALIST

## 2025-01-16 PROCEDURE — 1159F MED LIST DOCD IN RCRD: CPT

## 2025-01-16 PROCEDURE — 3074F SYST BP LT 130 MM HG: CPT | Performed by: CLINICAL NURSE SPECIALIST

## 2025-01-16 RX ORDER — BUMETANIDE 1 MG/1
1 TABLET ORAL
Qty: 60 TABLET | Refills: 0 | Status: SHIPPED | OUTPATIENT
Start: 2025-01-16

## 2025-01-16 ASSESSMENT — ENCOUNTER SYMPTOMS
NERVOUS/ANXIOUS: 0
CONSTIPATION: 0
PSYCHIATRIC NEGATIVE: 1
SHORTNESS OF BREATH: 0
WEAKNESS: 1
MUSCULOSKELETAL NEGATIVE: 1
UNEXPECTED WEIGHT CHANGE: 0
NAUSEA: 0
ARTHRALGIAS: 0
RECTAL PAIN: 0
POLYPHAGIA: 0
CHILLS: 0
TROUBLE SWALLOWING: 0
ENDOCRINE NEGATIVE: 1
DIAPHORESIS: 0
DIFFICULTY URINATING: 0
WEAKNESS: 1
CONSTITUTIONAL NEGATIVE: 1
ABDOMINAL PAIN: 0
POLYDIPSIA: 0
RESPIRATORY NEGATIVE: 1
FREQUENCY: 0
CHEST TIGHTNESS: 0
DIARRHEA: 0
FATIGUE: 1
WOUND: 0
PALPITATIONS: 0
HEADACHES: 0
GASTROINTESTINAL NEGATIVE: 1
NUMBNESS: 0
MYALGIAS: 0

## 2025-01-16 NOTE — PROGRESS NOTES
Subjective   Patient ID: Garcia Haskins is a 74 y.o. male who presents for Follow-up (1 month /Review labs).  Patient being seen in follow-up for chronic kidney disease with hypokalemia, had recent hospitalization secondary to this    Labs reviewed  Ionized calcium 1.15  Magnesium 1.81  H&H 7.8 and 24.2  Glucose 92  Sodium 135, potassium 4.4, chloride 96, bicarb 28  Renal function with a BUN of 52 and creatinine of 4.18, GFR is 14  Last iron 35 with a saturation of 17, ferritin 268    He presented today with a close family friend, she is good friends with his daughter and helps out  She is very concerned that he is living at home by himself and feels that he is not able to take care of himself well at home, they are looking at some assisted living to see if they can get him into this  He is complaining of fatigue, he has had some falls at home, he is voiding without difficulties, he has not received his Jardiance yet, he decreased his Bumex to 1 a day secondary to his frequent urination  He is trying to drink significant amount of fluid each day  He denies lightheadedness or dizziness  The lady who is with him today states he has had some confusion however it has improved recently        Review of Systems   Constitutional: Negative.    Respiratory: Negative.     Cardiovascular:  Positive for leg swelling.   Gastrointestinal: Negative.    Endocrine: Negative.    Genitourinary: Negative.    Musculoskeletal: Negative.    Skin: Negative.    Neurological:  Positive for weakness.   Psychiatric/Behavioral: Negative.         Objective   Physical Exam  Vitals reviewed.   Constitutional:       Appearance: He is ill-appearing.   HENT:      Head: Normocephalic.   Cardiovascular:      Rate and Rhythm: Normal rate and regular rhythm.   Pulmonary:      Effort: Pulmonary effort is normal.      Breath sounds: Normal breath sounds.   Abdominal:      General: Abdomen is flat. Bowel sounds are normal.      Palpations: Abdomen is  soft.   Musculoskeletal:         General: Normal range of motion.      Right lower leg: Edema present.      Left lower leg: Edema present.   Skin:     General: Skin is warm and dry.   Neurological:      Mental Status: He is alert and oriented to person, place, and time.   Psychiatric:         Mood and Affect: Mood normal.         Behavior: Behavior normal.         Assessment/Plan   Problem List Items Addressed This Visit             ICD-10-CM    Primary hypertension I10     Blood pressure is currently well-controlled on hydralazine and nifedipine         Relevant Medications    bumetanide (Bumex) 1 mg tablet    Stage 3a chronic kidney disease (Multi) N18.31    Anemia of chronic disorder D63.8     Improvement in his hemoglobin which has increased to 7.8, will recheck iron levels, if his renal function improves we may be able to start him with some infusions however if it does not he will receive iron and erythropoietin during his dialysis         Chronic kidney disease, stage 5 (Multi) - Primary N18.5    Relevant Orders    Comprehensive metabolic panel    Iron and TIBC    CBC    Acute kidney injury (CMS-HCC) N17.9     Has had significant worsening of his kidney function over the last 3 weeks, creatinine is currently up to 4.18, will attempt to discontinue Jardiance (which has not been started as he has not received but I asked him not to start it when he gets in the mail), will also try to discontinue Bumex, he has decrease this to once a day, has some mild lower extremity edema, I did tell him if his edema increases he needs to restart the Bumex, blood pressure is well-controlled, we will recheck labs on Tuesday of next week as that is when he can go to [place] to get them drawn, he will follow-up with me on Wednesday, I did speak with him about dialysis and the need to possibly start hemodialysis, he is aware of how this works as his wife was on it in the past, he is agreeable to start this if needed, I did offer  him to transfer to [place] if that would be easier for him as he lives in [place] but he would like to continue to receive care in [place]          Other Visit Diagnoses         Codes    Hypercalcemia     E83.52        Of note he has had significant improvement in his calcium which is now up to 9.3, and his sodium has also improved which is up to 135    Acute renal failure: Likely intrinsic: ATN  Chronic kidney disease with evolving baseline at this time, possibly in stage V  Right renal cyst  Atrophic right kidney  Anemia of chronic disease  Type 2 diabetes  Microalbuminuria  Hypertension: Controlled at this time with hydralazine and nifedipine  Hyperlipidemia  Peripheral edema with volume overload and anasarca  Normal anion gap metabolic acidosis              Dalila Rachel, SANJANA-PASCUAL, DNP 01/16/25 3:00 PM

## 2025-01-16 NOTE — PATIENT INSTRUCTIONS
Stop jardiance  Hold bumex unless swelling gets worse then restart  Labs on Tuesday  See in the office on Wednesday

## 2025-01-16 NOTE — ASSESSMENT & PLAN NOTE
Has had significant worsening of his kidney function over the last 3 weeks, creatinine is currently up to 4.18, will attempt to discontinue Jardiance (which has not been started as he has not received but I asked him not to start it when he gets in the mail), will also try to discontinue Bumex, he has decrease this to once a day, has some mild lower extremity edema, I did tell him if his edema increases he needs to restart the Bumex, blood pressure is well-controlled, we will recheck labs on Tuesday of next week as that is when he can go to [place] to get them drawn, he will follow-up with me on Wednesday, I did speak with him about dialysis and the need to possibly start hemodialysis, he is aware of how this works as his wife was on it in the past, he is agreeable to start this if needed, I did offer him to transfer to [place] if that would be easier for him as he lives in [place] but he would like to continue to receive care in [place]

## 2025-01-16 NOTE — ASSESSMENT & PLAN NOTE
Improvement in his hemoglobin which has increased to 7.8, will recheck iron levels, if his renal function improves we may be able to start him with some infusions however if it does not he will receive iron and erythropoietin during his dialysis

## 2025-01-16 NOTE — PROGRESS NOTES
"Patient: Garcia Haskins  : 1950  PCP: Ben Dias MD  MRN: [id number]  Program: Transitional Care Management  Status: Enrolled  Effective Dates: 2025 - present  Responsible Staff: Estrellita Joseph CMA  Social Drivers to be Addressed: No information to display    Patient presents today for follow-up from hospitalization.  Was hospitalized for exacerbation of chronic kidney disease.  His already followed up with his nephrologist, Dalila Rachel, today.  His lab values have not improved, his Jardiance and Bumex were discontinued by his nephrologist.  They will be drawing new labs next week and if not improved he will be placed on dialysis.  We had a discussion about dialysis treatments, he is aware of the process as his wife had dialysis as well.  He is currently here with a family friend that is helping to coordinate placement at assisted living.  After being admitted to the hospital he spent some time at [facility] living and felt that his life is more stable and controlled in that setting and now would like to be placed in an assisted living permanently.  At this time they appear to have all of their plans in order and all follow-up appointments scheduled.  They will reach out to us if they need any further coordination.     Garcia Haskins is a 74 y.o. male presenting today for follow-up after being discharged from the hospital 10 days ago. The main problem requiring admission was CKD. The discharge summary and/or Transitional Care Management documentation was reviewed. Medication reconciliation was performed as indicated via the \"Joseph as Reviewed\" timestamp.     Garcia Haskins was contacted by Transitional Care Management services two days after his discharge. This encounter and supporting documentation was reviewed.    Review of Systems   Constitutional:  Positive for fatigue. Negative for chills, diaphoresis and unexpected weight change.   HENT:  Negative for dental problem, tinnitus and " "trouble swallowing.    Eyes:  Negative for visual disturbance.   Respiratory:  Negative for chest tightness and shortness of breath.    Cardiovascular:  Negative for chest pain, palpitations and leg swelling.   Gastrointestinal:  Negative for abdominal pain, constipation, diarrhea, nausea and rectal pain.   Endocrine: Negative for polydipsia, polyphagia and polyuria.   Genitourinary:  Negative for difficulty urinating, frequency and urgency.   Musculoskeletal:  Negative for arthralgias and myalgias.   Skin:  Negative for pallor and wound.   Neurological:  Positive for weakness. Negative for syncope, numbness and headaches.   Psychiatric/Behavioral:  Negative for suicidal ideas. The patient is not nervous/anxious.        /56   Pulse 63   Ht 1.753 m (5' 9\")   Wt 67.2 kg (148 lb 3.2 oz)   SpO2 95%   BMI 21.89 kg/m²     Physical Exam  Vitals and nursing note reviewed.   Constitutional:       General: He is not in acute distress.     Appearance: Normal appearance. He is normal weight.   HENT:      Head: Normocephalic.      Nose: Nose normal.      Mouth/Throat:      Mouth: Mucous membranes are moist.      Pharynx: Oropharynx is clear.   Eyes:      General: No scleral icterus.     Pupils: Pupils are equal, round, and reactive to light.   Cardiovascular:      Rate and Rhythm: Normal rate and regular rhythm.      Pulses: Normal pulses.      Heart sounds: Murmur heard.   Pulmonary:      Effort: Pulmonary effort is normal. No respiratory distress.      Breath sounds: Normal breath sounds. No stridor. No wheezing, rhonchi or rales.   Abdominal:      General: Bowel sounds are normal. There is no distension.      Palpations: Abdomen is soft.   Musculoskeletal:         General: Normal range of motion.      Cervical back: Normal range of motion.   Skin:     General: Skin is warm and dry.      Capillary Refill: Capillary refill takes less than 2 seconds.      Coloration: Skin is not jaundiced.   Neurological:      " General: No focal deficit present.      Mental Status: He is alert and oriented to person, place, and time. Mental status is at baseline.   Psychiatric:         Mood and Affect: Mood normal.         Behavior: Behavior normal.         Thought Content: Thought content normal.         Judgment: Judgment normal.         The complexity of medical decision making for this patient's transitional care is moderate.    Assessment/Plan   Diagnoses and all orders for this visit:  Stage 3a chronic kidney disease (Multi)

## 2025-01-22 ENCOUNTER — APPOINTMENT (OUTPATIENT)
Dept: NEPHROLOGY | Facility: CLINIC | Age: 75
End: 2025-01-22
Payer: MEDICARE

## 2025-01-22 ENCOUNTER — PATIENT OUTREACH (OUTPATIENT)
Dept: PRIMARY CARE | Facility: CLINIC | Age: 75
End: 2025-01-22

## 2025-01-23 ENCOUNTER — LAB (OUTPATIENT)
Dept: LAB | Facility: LAB | Age: 75
End: 2025-01-23
Payer: MEDICARE

## 2025-01-23 DIAGNOSIS — N18.5 CHRONIC KIDNEY DISEASE, STAGE 5 (MULTI): ICD-10-CM

## 2025-01-23 LAB
ALBUMIN SERPL BCP-MCNC: 3.5 G/DL (ref 3.4–5)
ALP SERPL-CCNC: 369 U/L (ref 33–136)
ALT SERPL W P-5'-P-CCNC: 49 U/L (ref 10–52)
ANION GAP SERPL CALC-SCNC: 10 MMOL/L (ref 10–20)
AST SERPL W P-5'-P-CCNC: 52 U/L (ref 9–39)
BILIRUB SERPL-MCNC: 0.3 MG/DL (ref 0–1.2)
BUN SERPL-MCNC: 60 MG/DL (ref 6–23)
CALCIUM SERPL-MCNC: 10 MG/DL (ref 8.6–10.3)
CHLORIDE SERPL-SCNC: 101 MMOL/L (ref 98–107)
CO2 SERPL-SCNC: 32 MMOL/L (ref 21–32)
CREAT SERPL-MCNC: 3.05 MG/DL (ref 0.5–1.3)
EGFRCR SERPLBLD CKD-EPI 2021: 21 ML/MIN/1.73M*2
ERYTHROCYTE [DISTWIDTH] IN BLOOD BY AUTOMATED COUNT: 15.9 % (ref 11.5–14.5)
GLUCOSE SERPL-MCNC: 93 MG/DL (ref 74–99)
HCT VFR BLD AUTO: 22.5 % (ref 41–52)
HGB BLD-MCNC: 7.3 G/DL (ref 13.5–17.5)
IRON SATN MFR SERPL: 15 % (ref 25–45)
IRON SERPL-MCNC: 39 UG/DL (ref 35–150)
MCH RBC QN AUTO: 26.4 PG (ref 26–34)
MCHC RBC AUTO-ENTMCNC: 32.4 G/DL (ref 32–36)
MCV RBC AUTO: 82 FL (ref 80–100)
NRBC BLD-RTO: 0 /100 WBCS (ref 0–0)
PLATELET # BLD AUTO: 312 X10*3/UL (ref 150–450)
POTASSIUM SERPL-SCNC: 4 MMOL/L (ref 3.5–5.3)
PROT SERPL-MCNC: 6 G/DL (ref 6.4–8.2)
RBC # BLD AUTO: 2.76 X10*6/UL (ref 4.5–5.9)
SODIUM SERPL-SCNC: 139 MMOL/L (ref 136–145)
TIBC SERPL-MCNC: 253 UG/DL (ref 240–445)
UIBC SERPL-MCNC: 214 UG/DL (ref 110–370)
WBC # BLD AUTO: 6.3 X10*3/UL (ref 4.4–11.3)

## 2025-01-23 PROCEDURE — 80053 COMPREHEN METABOLIC PANEL: CPT

## 2025-01-23 PROCEDURE — 83540 ASSAY OF IRON: CPT

## 2025-01-23 PROCEDURE — 85027 COMPLETE CBC AUTOMATED: CPT

## 2025-01-23 PROCEDURE — 83550 IRON BINDING TEST: CPT

## 2025-01-24 ENCOUNTER — APPOINTMENT (OUTPATIENT)
Dept: NEPHROLOGY | Facility: CLINIC | Age: 75
End: 2025-01-24
Payer: MEDICARE

## 2025-01-29 ENCOUNTER — APPOINTMENT (OUTPATIENT)
Dept: NEPHROLOGY | Facility: CLINIC | Age: 75
End: 2025-01-29
Payer: MEDICARE

## 2025-01-29 DIAGNOSIS — I10 PRIMARY HYPERTENSION: ICD-10-CM

## 2025-01-29 RX ORDER — NIFEDIPINE 90 MG/1
90 TABLET, EXTENDED RELEASE ORAL DAILY
Qty: 100 TABLET | Refills: 2 | OUTPATIENT
Start: 2025-01-29

## 2025-01-30 ENCOUNTER — OFFICE VISIT (OUTPATIENT)
Dept: NEPHROLOGY | Facility: CLINIC | Age: 75
End: 2025-01-30
Payer: MEDICARE

## 2025-01-30 VITALS
BODY MASS INDEX: 20.83 KG/M2 | WEIGHT: 140.6 LBS | SYSTOLIC BLOOD PRESSURE: 134 MMHG | DIASTOLIC BLOOD PRESSURE: 56 MMHG | HEART RATE: 73 BPM | HEIGHT: 69 IN

## 2025-01-30 DIAGNOSIS — E11.65 TYPE 2 DIABETES MELLITUS WITH HYPERGLYCEMIA, WITHOUT LONG-TERM CURRENT USE OF INSULIN: ICD-10-CM

## 2025-01-30 DIAGNOSIS — D63.8 ANEMIA OF CHRONIC DISORDER: ICD-10-CM

## 2025-01-30 DIAGNOSIS — N18.4 CHRONIC KIDNEY DISEASE, STAGE 4 (SEVERE) (MULTI): Primary | ICD-10-CM

## 2025-01-30 DIAGNOSIS — N40.0 BENIGN PROSTATIC HYPERPLASIA, UNSPECIFIED WHETHER LOWER URINARY TRACT SYMPTOMS PRESENT: ICD-10-CM

## 2025-01-30 DIAGNOSIS — E20.9 HYPOPARATHYROIDISM, UNSPECIFIED HYPOPARATHYROIDISM TYPE (MULTI): ICD-10-CM

## 2025-01-30 DIAGNOSIS — I10 PRIMARY HYPERTENSION: ICD-10-CM

## 2025-01-30 PROCEDURE — 3075F SYST BP GE 130 - 139MM HG: CPT | Performed by: CLINICAL NURSE SPECIALIST

## 2025-01-30 PROCEDURE — 1036F TOBACCO NON-USER: CPT | Performed by: CLINICAL NURSE SPECIALIST

## 2025-01-30 PROCEDURE — 99214 OFFICE O/P EST MOD 30 MIN: CPT | Performed by: CLINICAL NURSE SPECIALIST

## 2025-01-30 PROCEDURE — 1160F RVW MEDS BY RX/DR IN RCRD: CPT | Performed by: CLINICAL NURSE SPECIALIST

## 2025-01-30 PROCEDURE — 1159F MED LIST DOCD IN RCRD: CPT | Performed by: CLINICAL NURSE SPECIALIST

## 2025-01-30 PROCEDURE — 3078F DIAST BP <80 MM HG: CPT | Performed by: CLINICAL NURSE SPECIALIST

## 2025-01-30 RX ORDER — TAMSULOSIN HYDROCHLORIDE 0.4 MG/1
0.4 CAPSULE ORAL DAILY
Qty: 90 CAPSULE | Refills: 1 | Status: SHIPPED | OUTPATIENT
Start: 2025-01-30

## 2025-01-30 ASSESSMENT — ENCOUNTER SYMPTOMS
MUSCULOSKELETAL NEGATIVE: 1
FATIGUE: 1
ENDOCRINE NEGATIVE: 1
RESPIRATORY NEGATIVE: 1
NEUROLOGICAL NEGATIVE: 1
FREQUENCY: 1
GASTROINTESTINAL NEGATIVE: 1
PSYCHIATRIC NEGATIVE: 1

## 2025-01-30 NOTE — PROGRESS NOTES
Subjective   Patient ID: Garcia Haskins is a 74 y.o. male who presents for Follow-up (Review labs ).  Being seen in follow-up for chronic kidney disease stage IV/V with recent acute kidney injury  Has history of atrophic right kidney, diabetes and hypertension    Labs reviewed  H&H 7.3 and 22.5  Iron 39 with iron saturation of 15%  Glucose 93  Sodium 139, potassium 4.0, chloride 101, bicarb 32  Renal functions BUN of 60 and creatinine of 3.05, GFR is 21, this is improved from his last check at which time he was 52 and 4.18 with a GFR of 14  Calcium is 10.0    He is beginning to feel a bit better  He is complaining of feeling tired  He states that he has to get up frequently during the night to go to the bathroom  He does use the urinal and it sounds as if he is not emptying his bladder all the way as he is only voiding about 100 to 150 mL each time, I have asked him to try to wait a little longer and to empty his bladder completely to help with some of his sleep  He has decreased his Bumex to just once a day  He feels that he has a good appetite          Review of Systems   Constitutional:  Positive for fatigue.   Respiratory: Negative.     Cardiovascular:  Positive for leg swelling (Mild).   Gastrointestinal: Negative.    Endocrine: Negative.    Genitourinary:  Positive for frequency.   Musculoskeletal: Negative.    Skin: Negative.    Neurological: Negative.    Psychiatric/Behavioral: Negative.         Objective   Physical Exam  Vitals reviewed.   Constitutional:       Appearance: Normal appearance.   HENT:      Head: Normocephalic.   Cardiovascular:      Rate and Rhythm: Normal rate and regular rhythm.   Pulmonary:      Effort: Pulmonary effort is normal.      Breath sounds: Normal breath sounds.   Abdominal:      Palpations: Abdomen is soft.   Musculoskeletal:         General: Normal range of motion.      Right lower leg: Edema (Trace) present.      Left lower leg: Edema (Trace) present.   Skin:     General:  Skin is warm and dry.   Neurological:      Mental Status: He is alert and oriented to person, place, and time.   Psychiatric:         Mood and Affect: Mood normal.         Behavior: Behavior normal.         Assessment/Plan   Problem List Items Addressed This Visit             ICD-10-CM    Type 2 diabetes mellitus with hyperglycemia, without long-term current use of insulin E11.65    Primary hypertension I10     Blood pressure is well-controlled we will continue on Cardura 4 mg twice a day, hydralazine 100 mg and nifedipine 90         Anemia of chronic disorder D63.8     Hemoglobin at 7.3 with iron saturation of 15, will set up for iron infusions, the difficulty will be if he can get a ride to come in, is taking his iron sulfate orally         Hypoparathyroidism E20.9    Chronic kidney disease, stage 4 (severe) (Multi) - Primary N18.4     Has had improvement in his creatinine which is currently down to 3.05, blood pressure is well-controlled, he is having what sounds like some urinary retention, will start Flomax secondary to this, his ultrasound did show 200+ in his bladder and he was unable to void at that time, will continue to monitor closely, will repeat labs in 2 weeks I will call him with these results         Relevant Medications    tamsulosin (Flomax) 0.4 mg 24 hr capsule    Other Relevant Orders    Basic metabolic panel    Follow Up In Nephrology    CBC    Comprehensive metabolic panel     Other Visit Diagnoses         Codes    Benign prostatic hyperplasia, unspecified whether lower urinary tract symptoms present     N40.0    Relevant Medications    tamsulosin (Flomax) 0.4 mg 24 hr capsule            Chronic kidney disease stage IV with evolving baseline at this time,   Right renal cyst  Atrophic right kidney  Anemia of chronic disease with iron deficiency  Type 2 diabetes  Microalbuminuria  Hypertension: Controlled at this time with hydralazine and nifedipine  Hyperlipidemia  Peripheral edema with volume  overload and anasarca  Normal anion gap metabolic acidosis          Dalila Rachel, SANJANA-PASCUAL, DNP 01/30/25 4:02 PM

## 2025-01-30 NOTE — ASSESSMENT & PLAN NOTE
Has had improvement in his creatinine which is currently down to 3.05, blood pressure is well-controlled, he is having what sounds like some urinary retention, will start Flomax secondary to this, his ultrasound did show 200+ in his bladder and he was unable to void at that time, will continue to monitor closely, will repeat labs in 2 weeks I will call him with these results

## 2025-01-30 NOTE — ASSESSMENT & PLAN NOTE
Blood pressure is well-controlled we will continue on Cardura 4 mg twice a day, hydralazine 100 mg and nifedipine 90

## 2025-01-30 NOTE — ASSESSMENT & PLAN NOTE
Hemoglobin at 7.3 with iron saturation of 15, will set up for iron infusions, the difficulty will be if he can get a ride to come in, is taking his iron sulfate orally

## 2025-02-03 NOTE — TELEPHONE ENCOUNTER
69 Patient called in for a refill on metformin and was discontinued on 11/27/24 by Dalila Rachel CNP

## 2025-02-13 DIAGNOSIS — N18.4 CHRONIC KIDNEY DISEASE, STAGE 4 (SEVERE) (MULTI): ICD-10-CM

## 2025-02-14 LAB
ANION GAP SERPL CALCULATED.4IONS-SCNC: 13 MMOL/L (CALC) (ref 7–17)
BUN SERPL-MCNC: 57 MG/DL (ref 7–25)
BUN/CREAT SERPL: 18 (CALC) (ref 6–22)
CALCIUM SERPL-MCNC: 7.1 MG/DL (ref 8.6–10.3)
CHLORIDE SERPL-SCNC: 94 MMOL/L (ref 98–110)
CO2 SERPL-SCNC: 30 MMOL/L (ref 20–32)
CREAT SERPL-MCNC: 3.13 MG/DL (ref 0.7–1.28)
EGFRCR SERPLBLD CKD-EPI 2021: 20 ML/MIN/1.73M2
GLUCOSE SERPL-MCNC: 255 MG/DL (ref 65–99)
POTASSIUM SERPL-SCNC: 4 MMOL/L (ref 3.5–5.3)
SODIUM SERPL-SCNC: 137 MMOL/L (ref 135–146)

## 2025-02-19 ENCOUNTER — PATIENT OUTREACH (OUTPATIENT)
Dept: PRIMARY CARE | Facility: CLINIC | Age: 75
End: 2025-02-19
Payer: MEDICARE

## 2025-02-20 ENCOUNTER — APPOINTMENT (OUTPATIENT)
Dept: HEMATOLOGY/ONCOLOGY | Facility: CLINIC | Age: 75
End: 2025-02-20
Payer: MEDICARE

## 2025-02-23 DIAGNOSIS — E03.9 ACQUIRED HYPOTHYROIDISM: ICD-10-CM

## 2025-02-24 RX ORDER — LEVOTHYROXINE SODIUM 175 UG/1
175 TABLET ORAL DAILY
Qty: 100 TABLET | Refills: 2 | OUTPATIENT
Start: 2025-02-24

## 2025-02-25 ENCOUNTER — APPOINTMENT (OUTPATIENT)
Dept: HEMATOLOGY/ONCOLOGY | Facility: CLINIC | Age: 75
End: 2025-02-25
Payer: MEDICARE

## 2025-02-26 DIAGNOSIS — G62.9 NEUROPATHY: ICD-10-CM

## 2025-02-26 RX ORDER — GABAPENTIN 300 MG/1
300 CAPSULE ORAL
Qty: 90 CAPSULE | Refills: 3 | OUTPATIENT
Start: 2025-02-26

## 2025-03-03 DIAGNOSIS — I10 PRIMARY HYPERTENSION: ICD-10-CM

## 2025-03-04 ENCOUNTER — APPOINTMENT (OUTPATIENT)
Dept: HEMATOLOGY/ONCOLOGY | Facility: CLINIC | Age: 75
End: 2025-03-04
Payer: MEDICARE

## 2025-03-10 RX ORDER — CLOPIDOGREL BISULFATE 75 MG/1
75 TABLET ORAL DAILY
Qty: 100 TABLET | Refills: 2 | OUTPATIENT
Start: 2025-03-10

## 2025-03-13 ENCOUNTER — TELEPHONE (OUTPATIENT)
Dept: PRIMARY CARE | Facility: CLINIC | Age: 75
End: 2025-03-13
Payer: MEDICARE

## 2025-03-13 ENCOUNTER — PATIENT OUTREACH (OUTPATIENT)
Dept: PRIMARY CARE | Facility: CLINIC | Age: 75
End: 2025-03-13
Payer: MEDICARE

## 2025-03-13 NOTE — TELEPHONE ENCOUNTER
Viraj from [facility] called and was wondering if you would follow for patient with nursing, PT and OT. He was in the Hospital from 3/6-3/12 with CHF at [facility]. I can call viraj back at [phone].

## 2025-03-13 NOTE — PROGRESS NOTES
Discharge Facility: [facility]  Discharge Diagnosis: Hypocalcemia   Admission Date: 3/6/2025  Discharge Date: 3/12/2025    PCP Appointment Date: none-4/11/2025  Specialist Appointment Date:   -nephrology 4/9/2025  -endo 4/9/2025    Hospital Encounter and Summary Linked: Yes  Hospital Encounter     Home Health Care Services -[facility]     Assessment and Plan  Garcia Haskins is a 74 y.o. male  [1] (PCP: Ben Dias MD) with history of hypertension, chronic kidney disease, diabetes, and hyperlipidemia, who presented to [facility] on 3/6/2025 with falls and altered mental status, and was found to have acute on chronic anemia, GLENIS on CKD, severe hypocalcemia, and hypokalemia.     Two attempts were made to reach patient within two business days after discharge. Voicemail left with contact information for patient to call back with any non-emergent questions or concerns.

## 2025-03-14 NOTE — TELEPHONE ENCOUNTER
Aditi from [facility] was notified that Dr. Dias will follow for this patient.

## 2025-03-14 NOTE — TELEPHONE ENCOUNTER
Deanna from [facility] called in and stated they have been unsuccessful reaching the patient. They will try again Monday but unsure what to do with they can't get in contact with him.

## 2025-03-17 NOTE — TELEPHONE ENCOUNTER
Patient LVM and said he has a cold and is taking coricidin.  He is asking if he can also take Tylenol?  Please advise.

## 2025-03-26 ENCOUNTER — PATIENT OUTREACH (OUTPATIENT)
Dept: PRIMARY CARE | Facility: CLINIC | Age: 75
End: 2025-03-26
Payer: MEDICARE

## 2025-03-31 DIAGNOSIS — Z00.00 HEALTHCARE MAINTENANCE: ICD-10-CM

## 2025-03-31 DIAGNOSIS — E11.65 TYPE 2 DIABETES MELLITUS WITH HYPERGLYCEMIA, WITHOUT LONG-TERM CURRENT USE OF INSULIN: ICD-10-CM

## 2025-03-31 DIAGNOSIS — Z12.5 ENCOUNTER FOR SCREENING FOR MALIGNANT NEOPLASM OF PROSTATE: Primary | ICD-10-CM

## 2025-04-07 DIAGNOSIS — N18.4 CHRONIC KIDNEY DISEASE, STAGE 4 (SEVERE) (MULTI): ICD-10-CM

## 2025-04-07 DIAGNOSIS — D64.9 ANEMIA, UNSPECIFIED TYPE: Primary | ICD-10-CM

## 2025-04-09 ENCOUNTER — APPOINTMENT (OUTPATIENT)
Dept: NEPHROLOGY | Facility: CLINIC | Age: 75
End: 2025-04-09
Payer: MEDICARE

## 2025-04-09 DIAGNOSIS — N40.0 BENIGN PROSTATIC HYPERPLASIA, UNSPECIFIED WHETHER LOWER URINARY TRACT SYMPTOMS PRESENT: ICD-10-CM

## 2025-04-09 DIAGNOSIS — N18.4 CHRONIC KIDNEY DISEASE, STAGE 4 (SEVERE) (MULTI): ICD-10-CM

## 2025-04-09 LAB
ALBUMIN/CREAT UR: 2404 MG/G CREAT
CHOLEST SERPL-MCNC: 151 MG/DL
CHOLEST/HDLC SERPL: 3 (CALC)
CREAT UR-MCNC: 25 MG/DL (ref 20–320)
HBA1C MFR BLD: 6.3 % OF TOTAL HGB
HDLC SERPL-MCNC: 50 MG/DL
LDLC SERPL CALC-MCNC: 77 MG/DL (CALC)
MICROALBUMIN UR-MCNC: 60.1 MG/DL
NONHDLC SERPL-MCNC: 101 MG/DL (CALC)
PSA SERPL-MCNC: 0.72 NG/ML
TRIGL SERPL-MCNC: 144 MG/DL

## 2025-04-09 RX ORDER — TAMSULOSIN HYDROCHLORIDE 0.4 MG/1
0.4 CAPSULE ORAL DAILY
Qty: 100 CAPSULE | Refills: 2 | Status: SHIPPED | OUTPATIENT
Start: 2025-04-09

## 2025-04-11 ENCOUNTER — OFFICE VISIT (OUTPATIENT)
Dept: NEPHROLOGY | Facility: CLINIC | Age: 75
End: 2025-04-11
Payer: MEDICARE

## 2025-04-11 ENCOUNTER — APPOINTMENT (OUTPATIENT)
Dept: PRIMARY CARE | Facility: CLINIC | Age: 75
End: 2025-04-11
Payer: MEDICARE

## 2025-04-11 VITALS
OXYGEN SATURATION: 98 % | BODY MASS INDEX: 21.99 KG/M2 | HEART RATE: 63 BPM | DIASTOLIC BLOOD PRESSURE: 60 MMHG | SYSTOLIC BLOOD PRESSURE: 140 MMHG | WEIGHT: 148.9 LBS

## 2025-04-11 VITALS — SYSTOLIC BLOOD PRESSURE: 142 MMHG | DIASTOLIC BLOOD PRESSURE: 80 MMHG | HEART RATE: 80 BPM

## 2025-04-11 DIAGNOSIS — D63.8 ANEMIA OF CHRONIC DISORDER: ICD-10-CM

## 2025-04-11 DIAGNOSIS — K21.9 GASTROESOPHAGEAL REFLUX DISEASE, UNSPECIFIED WHETHER ESOPHAGITIS PRESENT: ICD-10-CM

## 2025-04-11 DIAGNOSIS — I10 PRIMARY HYPERTENSION: ICD-10-CM

## 2025-04-11 DIAGNOSIS — E11.65 TYPE 2 DIABETES MELLITUS WITH HYPERGLYCEMIA, WITHOUT LONG-TERM CURRENT USE OF INSULIN: ICD-10-CM

## 2025-04-11 DIAGNOSIS — G62.9 NEUROPATHY: ICD-10-CM

## 2025-04-11 DIAGNOSIS — E03.9 ACQUIRED HYPOTHYROIDISM: ICD-10-CM

## 2025-04-11 DIAGNOSIS — E20.9 HYPOPARATHYROIDISM, UNSPECIFIED HYPOPARATHYROIDISM TYPE (MULTI): ICD-10-CM

## 2025-04-11 DIAGNOSIS — N18.5 CHRONIC KIDNEY DISEASE, STAGE 5 (MULTI): Primary | ICD-10-CM

## 2025-04-11 DIAGNOSIS — N18.4 CHRONIC KIDNEY DISEASE, STAGE 4 (SEVERE) (MULTI): ICD-10-CM

## 2025-04-11 DIAGNOSIS — K92.2 GASTROINTESTINAL HEMORRHAGE, UNSPECIFIED GASTROINTESTINAL HEMORRHAGE TYPE: Primary | ICD-10-CM

## 2025-04-11 DIAGNOSIS — E03.9 HYPOTHYROIDISM, UNSPECIFIED TYPE: ICD-10-CM

## 2025-04-11 DIAGNOSIS — E78.49 OTHER HYPERLIPIDEMIA: ICD-10-CM

## 2025-04-11 DIAGNOSIS — E78.00 HYPERCHOLESTEROLEMIA: ICD-10-CM

## 2025-04-11 PROCEDURE — 99214 OFFICE O/P EST MOD 30 MIN: CPT | Performed by: CLINICAL NURSE SPECIALIST

## 2025-04-11 PROCEDURE — 3078F DIAST BP <80 MM HG: CPT | Performed by: FAMILY MEDICINE

## 2025-04-11 PROCEDURE — 1036F TOBACCO NON-USER: CPT | Performed by: CLINICAL NURSE SPECIALIST

## 2025-04-11 PROCEDURE — 3077F SYST BP >= 140 MM HG: CPT | Performed by: FAMILY MEDICINE

## 2025-04-11 PROCEDURE — 3077F SYST BP >= 140 MM HG: CPT | Performed by: CLINICAL NURSE SPECIALIST

## 2025-04-11 PROCEDURE — 1159F MED LIST DOCD IN RCRD: CPT | Performed by: FAMILY MEDICINE

## 2025-04-11 PROCEDURE — 1159F MED LIST DOCD IN RCRD: CPT | Performed by: CLINICAL NURSE SPECIALIST

## 2025-04-11 PROCEDURE — 1160F RVW MEDS BY RX/DR IN RCRD: CPT | Performed by: CLINICAL NURSE SPECIALIST

## 2025-04-11 PROCEDURE — 1036F TOBACCO NON-USER: CPT | Performed by: FAMILY MEDICINE

## 2025-04-11 PROCEDURE — 3079F DIAST BP 80-89 MM HG: CPT | Performed by: CLINICAL NURSE SPECIALIST

## 2025-04-11 PROCEDURE — 99214 OFFICE O/P EST MOD 30 MIN: CPT | Performed by: FAMILY MEDICINE

## 2025-04-11 RX ORDER — CLONIDINE HYDROCHLORIDE 0.2 MG/1
0.2 TABLET ORAL EVERY 12 HOURS
Qty: 200 TABLET | Refills: 2 | Status: SHIPPED | OUTPATIENT
Start: 2025-04-11

## 2025-04-11 RX ORDER — OMEPRAZOLE 20 MG/1
20 CAPSULE, DELAYED RELEASE ORAL 2 TIMES DAILY
COMMUNITY
End: 2025-04-11 | Stop reason: SDUPTHER

## 2025-04-11 RX ORDER — HYDRALAZINE HYDROCHLORIDE 100 MG/1
100 TABLET, FILM COATED ORAL 3 TIMES DAILY
Qty: 90 TABLET | Refills: 2 | Status: SHIPPED | OUTPATIENT
Start: 2025-04-11 | End: 2026-04-11

## 2025-04-11 RX ORDER — NIFEDIPINE 90 MG/1
90 TABLET, EXTENDED RELEASE ORAL DAILY
Qty: 100 TABLET | Refills: 2 | Status: SHIPPED | OUTPATIENT
Start: 2025-04-11

## 2025-04-11 RX ORDER — METFORMIN HYDROCHLORIDE 500 MG/1
1000 TABLET, EXTENDED RELEASE ORAL DAILY
Qty: 200 TABLET | Refills: 2 | Status: CANCELLED | OUTPATIENT
Start: 2025-04-11

## 2025-04-11 RX ORDER — OMEPRAZOLE 20 MG/1
20 CAPSULE, DELAYED RELEASE ORAL
Qty: 90 CAPSULE | Refills: 0 | Status: SHIPPED | OUTPATIENT
Start: 2025-04-11 | End: 2026-04-11

## 2025-04-11 RX ORDER — DOXAZOSIN 2 MG/1
2 TABLET ORAL DAILY
Qty: 90 TABLET | Refills: 1 | Status: SHIPPED | OUTPATIENT
Start: 2025-04-11 | End: 2026-04-11

## 2025-04-11 RX ORDER — IBUPROFEN 200 MG
2850 CAPSULE ORAL DAILY
COMMUNITY
Start: 2025-03-12 | End: 2025-04-11 | Stop reason: SDUPTHER

## 2025-04-11 RX ORDER — CALCITRIOL 0.5 UG/1
0.5 CAPSULE ORAL DAILY
Qty: 90 CAPSULE | Refills: 1 | Status: SHIPPED | OUTPATIENT
Start: 2025-04-11 | End: 2026-04-11

## 2025-04-11 RX ORDER — CLOPIDOGREL BISULFATE 75 MG/1
75 TABLET ORAL DAILY
Qty: 100 TABLET | Refills: 2 | Status: SHIPPED | OUTPATIENT
Start: 2025-04-11

## 2025-04-11 RX ORDER — SIMVASTATIN 40 MG/1
40 TABLET, FILM COATED ORAL DAILY
Qty: 100 TABLET | Refills: 2 | Status: SHIPPED | OUTPATIENT
Start: 2025-04-11

## 2025-04-11 RX ORDER — GABAPENTIN 300 MG/1
300 CAPSULE ORAL NIGHTLY
Qty: 30 CAPSULE | Refills: 2 | Status: SHIPPED | OUTPATIENT
Start: 2025-04-11 | End: 2026-04-11

## 2025-04-11 RX ORDER — IBUPROFEN 200 MG
600 CAPSULE ORAL 3 TIMES DAILY
Qty: 270 TABLET | Refills: 0 | Status: SHIPPED | OUTPATIENT
Start: 2025-04-11 | End: 2025-05-11

## 2025-04-11 RX ORDER — LEVOTHYROXINE SODIUM 175 UG/1
175 TABLET ORAL DAILY
Qty: 100 TABLET | Refills: 2 | Status: SHIPPED | OUTPATIENT
Start: 2025-04-11

## 2025-04-11 RX ORDER — CLONIDINE HYDROCHLORIDE 0.2 MG/1
0.2 TABLET ORAL EVERY 12 HOURS
COMMUNITY
Start: 2025-03-12 | End: 2025-04-11 | Stop reason: SDUPTHER

## 2025-04-11 ASSESSMENT — ENCOUNTER SYMPTOMS
RESPIRATORY NEGATIVE: 1
GASTROINTESTINAL NEGATIVE: 1
ENDOCRINE NEGATIVE: 1
CARDIOVASCULAR NEGATIVE: 1
FREQUENCY: 1
PSYCHIATRIC NEGATIVE: 1
FATIGUE: 1
NEUROLOGICAL NEGATIVE: 1
MUSCULOSKELETAL NEGATIVE: 1

## 2025-04-11 NOTE — ASSESSMENT & PLAN NOTE
Unsure of current renal function, I will call him on Monday with these results, blood pressure is well-controlled, not diabetic, not using nephrotoxic medications

## 2025-04-11 NOTE — PROGRESS NOTES
Subjective   Patient ID: Garcia Haskins is a 74 y.o. male who presents for Follow-up (2 months).  Patient being seen in follow-up for chronic kidney disease, was recently discharged from [facility] on 312 at which time he had an acute on chronic kidney injury, at time of discharge his creatinine was 3.38, also has a history of hypokalemia and hypoparathyroidism    He was admitted initially to the hospital for acute metabolic encephalopathy and had a fall, at that time his blood pressure was significantly elevated along with significant abnormal values of his labs    He did have urine completed yesterday however no blood work was completed until this morning, he did try to get this yesterday but apparently the lab could not see the orders his albumin creatinine ratio is 2404  Hemoglobin A1c is 6.3      On 3/12 at time of discharge from the hospital his sodium was 138, potassium 3.7, chloride 104, bicarb 22  Renal functions BUN of 67 and creatinine of 4.16  Total protein 5.3 with albumin of 2.8  Calcium 7.8    His blood pressure is good in the office and it also has been running good at home  He states that his diastolic sometimes is a little low in the 50s and 60s  He is also complaining of a dry mouth and frequent urination as he states he has to drink a lot of water because of his dry mouth  He is also complaining of some fatigue, he states Monday and Tuesday he was doing well however today he is feeling very fatigued  He stopped taking his doxazosin because he thought it was bad for his kidneys          Review of Systems   Constitutional:  Positive for fatigue.   HENT:          Dry mouth   Respiratory: Negative.     Cardiovascular: Negative.    Gastrointestinal: Negative.    Endocrine: Negative.    Genitourinary:  Positive for frequency.   Musculoskeletal: Negative.    Skin: Negative.    Neurological: Negative.    Psychiatric/Behavioral: Negative.         Objective   Physical Exam  Vitals reviewed.    Constitutional:       Appearance: Normal appearance.   HENT:      Head: Normocephalic.   Cardiovascular:      Rate and Rhythm: Normal rate and regular rhythm.   Pulmonary:      Effort: Pulmonary effort is normal.      Breath sounds: Normal breath sounds.   Abdominal:      Palpations: Abdomen is soft.   Musculoskeletal:         General: Normal range of motion.   Skin:     General: Skin is warm and dry.   Neurological:      Mental Status: He is alert and oriented to person, place, and time.   Psychiatric:         Mood and Affect: Mood normal.         Behavior: Behavior normal.       Assessment/Plan   Problem List Items Addressed This Visit             ICD-10-CM    Type 2 diabetes mellitus with hyperglycemia, without long-term current use of insulin E11.65     Last hemoglobin A1c 5.3, not on any diabetic medications         Primary hypertension I10     Blood pressure is currently well-controlled on hydralazine 103 times a day, clonidine 0.2 twice a day, nifedipine 90 mg every day, he was not taking the doxazosin however we are going to have his clonidine over the weekend and restart his doxazosin at 2 mg twice a day, I will talk to him on Monday if his blood pressure continues to be well-controlled we will discontinue the clonidine and increase the doxazosin, goal blood pressure 130/80         Anemia of chronic disorder D63.8     Unsure of his blood counts, he states he had to get a blood transfusion when he was in the hospital, CBC is pending, if it is low I will set him up for an infusion on Monday or Tuesday         Chronic kidney disease, stage 5 (Multi) - Primary N18.5     Unsure of current renal function, I will call him on Monday with these results, blood pressure is well-controlled, not diabetic, not using nephrotoxic medications         Relevant Orders    Comprehensive metabolic panel    CBC    Phosphorus    Uric acid    Follow Up In Nephrology    Hypothyroidism E03.9    Hypercholesterolemia E78.00     Chronic kidney disease, stage 4 (severe) (Multi) N18.4     Chronic kidney disease stage IV with evolving baseline at this time,   Right renal cyst  Atrophic right kidney  Anemia of chronic disease with iron deficiency  Type 2 diabetes  Microalbuminuria  Hypertension: Controlled at this time with hydralazine and nifedipine  Hyperlipidemia  Peripheral edema with volume overload and anasarca  Normal anion gap metabolic acidosis       SANJANA Robb-PASCUAL, DNP 04/11/25 11:55 AM

## 2025-04-11 NOTE — PROGRESS NOTES
Subjective   Patient ID: Garcia Haskins is a 74 y.o. male who presents for Hypertension, Hypothyroidism, Hyperlipidemia, Neuropathy, and Diabetes (Lab Results/     Component                Value               Date                /     HGBA1C                   6.3 (H)             04/08/2025           ).  HPI  Pt recently admitted     Labs PSA normal  Cholesterol 151 On simvastatin  Ckd  4      followed by nephrology  Dr Rachel       egfr 14 1 month ago      HTN  borderline control followed by renal      Hydralazine has been increased to 100 mg tid      PVD   2022 PAD test borderline/normal    patient is already on Clopidogrel and statin therapy.      Patient is a diabetic he is on Metformin      Will stop metformin due to ckd 4 and monitor off   a1c   August 2024= 6.7   A1c October 2025 6.3  Microalbumin 2404  Checks BG 2 to 3  per week  100 to 130   last eye exam more than 3+ yr ago      Former smoker quit 2002   No longer going to [facility]    Hypothyroid last tsh was 4.7 1 month ago      H/o cva on plavix     Unable to tolerate asa gi ulcer    No melena but with iron is dark     In blood in the stool     Garcia Haskins is a 74 y.o. male  [1] (PCP: Ben Dias MD) with history of hypertension, chronic kidney disease, diabetes, and hyperlipidemia, who presented to [facility] on 3/6/2025 with falls and altered mental status, and was found to have acute on chronic anemia, GLENIS on CKD, severe hypocalcemia, and hypokalemia.    Severe hypocalcemia  Hypoparathyroidism  Presented with falls and AMS  Was hospitalized in December for severe hypocalcemia as well  Calcium 5.7 on admission, iCal 3.7, phosphorus 5.6  Vitamin D 25-hydroxy level within normal limit 41  PTH low, 9.1  Endocrinology on board, s/p IV calcium drip  Continue Calcitrol 0.5 mcg daily and calcium citrate tablets  Patient did well with physical therapy with recommendation up to 3 days a week physical therapy  discharge patient home with  home health    Acute kidney injury  Chronic kidney disease, stage IV  History of previous urinary tension  Follows with Dr. Rachel in [place]  Baseline creatinine appears to be 2.5-3  Was 3.15 on arrival, worsening to 3.38 today  Nephrology consulted on admission  S/p gentle IVF  Monitor renal function closely  Nephrology consulted, appreciate input, cleared by nephrology for discharge and follow-up as an outpatient    Acute metabolic encephalopathy, resolved  Mechanical fall  Presented via EMS after fall witnessed by law enforcement when they were performing a welfare check on patient, family reportedly concerned about encephalopathy for a few days.  CT head and C-spine nonacute.  BP severely elevated on admission >200s.  Also noted to have electrolyte abnormalities including hypokalemia and hypocalcemia.  Encephalopathy appeared to improve after blood pressure control.  Appears to be approaching baseline on discharge    Acute on chronic anemia, stable  Baseline hemoglobin appears to be 8-9  8.9 on admission, dropped to 7.1 on 3/7, 6.8 on 3/0.9, transfuse 1 units of PRBC  CT abdomen and pelvis 3/9 negative for acute findings  Follow-up with PCP for possible GI referral, GI workup for bleeding    Uncontrolled hypertension, resolved  BP 180s over 80s on admission, increased to 240s SBP in the ED  Home nifedipine 90 mg and clonidine 0.2 mg twice daily  Resume nifedipine and clonidine 0.1 mg twice daily for now, hydralazine, will continue on discharge    Coronary artery disease  Elevated troponin likely due to demand ischemia  Prolonged QTc interval  Trops increasing 117, 136, 151 on admission  QT greater than 570  Cardiology consulted, appreciate recs  Continue home Plavix and Zocor on discharge  Avoid QT prolonging meds    Type 2 diabetes mellitus with hyperglycemia  A1c 5.9% this admission  On metformin at home, likely should be discontinued in context of chronic kidney disease  On discharge patient to return to  metformin    Acquired hypothyroidism  On levothyroxine 175 mcg daily at home  TSH within normal limit 2.6  Continued home dosing    BPH  Continue home Flomax    Chronic anemia  Hemoglobin baseline from 8-9  Currently stable at 9  Hemoccult is positive  He will need GI workup as an outpatient     Stools every 3 days constipated   No meldavida   Has cbc and cmp with [facility] Ramesh     Review of Systems    Objective   Visit Vitals  /60   Pulse 63   Wt 67.5 kg (148 lb 14.4 oz)   SpO2 98%   BMI 21.99 kg/m²   Smoking Status Former   BSA 1.81 m²       Physical Exam  Vitals reviewed.   Constitutional:       Appearance: Normal appearance.   HENT:      Head: Normocephalic and atraumatic.   Eyes:      Conjunctiva/sclera: Conjunctivae normal.   Cardiovascular:      Rate and Rhythm: Normal rate and regular rhythm.   Pulmonary:      Effort: Pulmonary effort is normal.      Breath sounds: Normal breath sounds.   Musculoskeletal:      Cervical back: Neck supple.   Skin:     General: Skin is warm and dry.   Neurological:      General: No focal deficit present.      Mental Status: He is alert and oriented to person, place, and time.   Psychiatric:         Mood and Affect: Mood normal.         Behavior: Behavior normal.         Thought Content: Thought content normal.         Judgment: Judgment normal.              Assessment/Plan   Diagnoses and all orders for this visit:  Acquired hypothyroidism  -     levothyroxine (Synthroid, Levoxyl) 175 mcg tablet; Take 1 tablet (175 mcg) by mouth once daily.  Neuropathy  -     gabapentin (Neurontin) 300 mg capsule; Take 1 capsule (300 mg) by mouth once daily at bedtime.  Other hyperlipidemia  -     simvastatin (Zocor) 40 mg tablet; Take 1 tablet (40 mg) by mouth once daily.  Primary hypertension  -     clopidogrel (Plavix) 75 mg tablet; Take 1 tablet (75 mg) by mouth once daily.  -     NIFEdipine ER (NIFEdipine CC) 90 mg 24 hr tablet; Take 1 tablet (90 mg) by mouth once daily.  -     cloNIDine  (Catapres) 0.2 mg tablet; Take 1 tablet (0.2 mg) by mouth every 12 hours.  -     doxazosin (Cardura) 2 mg tablet; Take 1 tablet (2 mg) by mouth once daily.  -     hydrALAZINE (Apresoline) 100 mg tablet; Take 1 tablet (100 mg) by mouth 3 times a day.  Hypoparathyroidism, unspecified hypoparathyroidism type (Multi)  -     calcitriol (Rocaltrol) 0.5 mcg capsule; Take 1 capsule (0.5 mcg) by mouth once daily.  -     calcium citrate (Calcitrate) 200 mg (950 mg) tablet; Take 3 tablets (600 mg) by mouth 3 times a day.  Type 2 diabetes mellitus with hyperglycemia, without long-term current use of insulin  Gastroesophageal reflux disease, unspecified whether esophagitis present  -     omeprazole (PriLOSEC) 20 mg DR capsule; Take 1 capsule (20 mg) by mouth once daily in the morning. Take before meals. Do not crush or chew.           Ben Dias MD 04/11/25 2:31 PM

## 2025-04-11 NOTE — ASSESSMENT & PLAN NOTE
Unsure of his blood counts, he states he had to get a blood transfusion when he was in the hospital, CBC is pending, if it is low I will set him up for an infusion on Monday or Tuesday

## 2025-04-11 NOTE — ASSESSMENT & PLAN NOTE
Blood pressure is currently well-controlled on hydralazine 103 times a day, clonidine 0.2 twice a day, nifedipine 90 mg every day, he was not taking the doxazosin however we are going to have his clonidine over the weekend and restart his doxazosin at 2 mg twice a day, I will talk to him on Monday if his blood pressure continues to be well-controlled we will discontinue the clonidine and increase the doxazosin, goal blood pressure 130/80

## 2025-04-12 LAB
ALBUMIN SERPL-MCNC: 3.3 G/DL (ref 3.6–5.1)
ALP SERPL-CCNC: 314 U/L (ref 35–144)
ALT SERPL-CCNC: 19 U/L (ref 9–46)
ANION GAP SERPL CALCULATED.4IONS-SCNC: 7 MMOL/L (CALC) (ref 7–17)
AST SERPL-CCNC: 21 U/L (ref 10–35)
BILIRUB SERPL-MCNC: 0.4 MG/DL (ref 0.2–1.2)
BUN SERPL-MCNC: 52 MG/DL (ref 7–25)
CALCIUM SERPL-MCNC: 9.6 MG/DL (ref 8.6–10.3)
CHLORIDE SERPL-SCNC: 100 MMOL/L (ref 98–110)
CO2 SERPL-SCNC: 31 MMOL/L (ref 20–32)
CREAT SERPL-MCNC: 2.98 MG/DL (ref 0.7–1.28)
EGFRCR SERPLBLD CKD-EPI 2021: 21 ML/MIN/1.73M2
ERYTHROCYTE [DISTWIDTH] IN BLOOD BY AUTOMATED COUNT: 15.7 % (ref 11–15)
GLUCOSE SERPL-MCNC: 109 MG/DL (ref 65–99)
HCT VFR BLD AUTO: 20.7 % (ref 38.5–50)
HGB BLD-MCNC: 6.7 G/DL (ref 13.2–17.1)
MCH RBC QN AUTO: 26.5 PG (ref 27–33)
MCHC RBC AUTO-ENTMCNC: 32.4 G/DL (ref 32–36)
MCV RBC AUTO: 81.8 FL (ref 80–100)
PLATELET # BLD AUTO: 182 THOUSAND/UL (ref 140–400)
PMV BLD REES-ECKER: 11 FL (ref 7.5–12.5)
POTASSIUM SERPL-SCNC: 3.7 MMOL/L (ref 3.5–5.3)
PROT SERPL-MCNC: 5.9 G/DL (ref 6.1–8.1)
RBC # BLD AUTO: 2.53 MILLION/UL (ref 4.2–5.8)
SODIUM SERPL-SCNC: 138 MMOL/L (ref 135–146)
WBC # BLD AUTO: 6.1 THOUSAND/UL (ref 3.8–10.8)

## 2025-04-14 ENCOUNTER — LAB (OUTPATIENT)
Dept: LAB | Facility: HOSPITAL | Age: 75
End: 2025-04-14
Payer: MEDICARE

## 2025-04-14 LAB
ABO GROUP (TYPE) IN BLOOD: NORMAL
ABO GROUP (TYPE) IN BLOOD: NORMAL
ANTIBODY SCREEN: NORMAL
RH FACTOR (ANTIGEN D): NORMAL
RH FACTOR (ANTIGEN D): NORMAL

## 2025-04-14 PROCEDURE — 86900 BLOOD TYPING SEROLOGIC ABO: CPT

## 2025-04-14 PROCEDURE — 86923 COMPATIBILITY TEST ELECTRIC: CPT

## 2025-04-14 PROCEDURE — 86901 BLOOD TYPING SEROLOGIC RH(D): CPT

## 2025-04-14 PROCEDURE — 86850 RBC ANTIBODY SCREEN: CPT

## 2025-04-15 ENCOUNTER — INFUSION (OUTPATIENT)
Dept: HEMATOLOGY/ONCOLOGY | Facility: CLINIC | Age: 75
End: 2025-04-15
Payer: MEDICARE

## 2025-04-15 VITALS
SYSTOLIC BLOOD PRESSURE: 167 MMHG | DIASTOLIC BLOOD PRESSURE: 65 MMHG | RESPIRATION RATE: 16 BRPM | HEART RATE: 76 BPM | OXYGEN SATURATION: 97 % | TEMPERATURE: 96.6 F

## 2025-04-15 DIAGNOSIS — D63.8 ANEMIA OF CHRONIC DISORDER: ICD-10-CM

## 2025-04-15 LAB
BLOOD EXPIRATION DATE: NORMAL
DISPENSE STATUS: NORMAL
PRODUCT BLOOD TYPE: 5100
PRODUCT CODE: NORMAL
UNIT ABO: NORMAL
UNIT NUMBER: NORMAL
UNIT RH: NORMAL
UNIT VOLUME: 350
XM INTEP: NORMAL

## 2025-04-15 PROCEDURE — 36430 TRANSFUSION BLD/BLD COMPNT: CPT

## 2025-04-15 PROCEDURE — P9016 RBC LEUKOCYTES REDUCED: HCPCS

## 2025-04-15 ASSESSMENT — PAIN SCALES - GENERAL: PAINLEVEL_OUTOF10: 1

## 2025-04-17 ENCOUNTER — TELEPHONE (OUTPATIENT)
Dept: HEMATOLOGY/ONCOLOGY | Facility: CLINIC | Age: 75
End: 2025-04-17
Payer: MEDICARE

## 2025-04-17 NOTE — TELEPHONE ENCOUNTER
Anemia  No chief complaint on file.    Background   We have called and scheduled Garcia Haskins several times for his Iron Infusions, but he never has transportation to the appointment, so cancels.  We have placed his Iron Infusion plan on hold.  Please let us know if we are able to assist with scheduling in the future.    Thank you    Disposition   No disposition on file.

## 2025-04-17 NOTE — TELEPHONE ENCOUNTER
Reason for Conversation  No chief complaint on file.    Background   ***    Disposition   No disposition on file.

## 2025-04-22 DIAGNOSIS — N18.5 CHRONIC KIDNEY DISEASE, STAGE 5 (MULTI): ICD-10-CM

## 2025-04-25 LAB
ALBUMIN SERPL-MCNC: 3.4 G/DL (ref 3.6–5.1)
ALP SERPL-CCNC: 324 U/L (ref 35–144)
ALT SERPL-CCNC: 25 U/L (ref 9–46)
ANION GAP SERPL CALCULATED.4IONS-SCNC: 12 MMOL/L (CALC) (ref 7–17)
AST SERPL-CCNC: 24 U/L (ref 10–35)
BILIRUB SERPL-MCNC: 0.3 MG/DL (ref 0.2–1.2)
BUN SERPL-MCNC: 81 MG/DL (ref 7–25)
CALCIUM SERPL-MCNC: 7 MG/DL (ref 8.6–10.3)
CHLORIDE SERPL-SCNC: 102 MMOL/L (ref 98–110)
CO2 SERPL-SCNC: 22 MMOL/L (ref 20–32)
CREAT SERPL-MCNC: 3.31 MG/DL (ref 0.7–1.28)
EGFRCR SERPLBLD CKD-EPI 2021: 19 ML/MIN/1.73M2
ERYTHROCYTE [DISTWIDTH] IN BLOOD BY AUTOMATED COUNT: 16.1 % (ref 11–15)
EST. AVERAGE GLUCOSE BLD GHB EST-MCNC: 126 MG/DL
EST. AVERAGE GLUCOSE BLD GHB EST-SCNC: 7 MMOL/L
GLUCOSE SERPL-MCNC: 106 MG/DL (ref 65–99)
HBA1C MFR BLD: 6 %
HCT VFR BLD AUTO: 23.1 % (ref 38.5–50)
HGB BLD-MCNC: 7.9 G/DL (ref 13.2–17.1)
MCH RBC QN AUTO: 28.3 PG (ref 27–33)
MCHC RBC AUTO-ENTMCNC: 34.2 G/DL (ref 32–36)
MCV RBC AUTO: 82.8 FL (ref 80–100)
PHOSPHATE SERPL-MCNC: 6.7 MG/DL (ref 2.1–4.3)
PLATELET # BLD AUTO: 280 THOUSAND/UL (ref 140–400)
PMV BLD REES-ECKER: 10.4 FL (ref 7.5–12.5)
POTASSIUM SERPL-SCNC: 3.8 MMOL/L (ref 3.5–5.3)
PROT SERPL-MCNC: 5.9 G/DL (ref 6.1–8.1)
RBC # BLD AUTO: 2.79 MILLION/UL (ref 4.2–5.8)
SODIUM SERPL-SCNC: 136 MMOL/L (ref 135–146)
URATE SERPL-MCNC: 5.8 MG/DL (ref 4–8)
WBC # BLD AUTO: 6.9 THOUSAND/UL (ref 3.8–10.8)

## 2025-05-09 ENCOUNTER — TELEPHONE (OUTPATIENT)
Dept: PRIMARY CARE | Facility: CLINIC | Age: 75
End: 2025-05-09
Payer: MEDICARE

## 2025-05-09 DIAGNOSIS — N18.5 CHRONIC KIDNEY DISEASE, STAGE 5 (MULTI): ICD-10-CM

## 2025-05-09 NOTE — TELEPHONE ENCOUNTER
Leilani from [facility] states pt will be discharging from hospital and will require PT and wanted to know if Dr. Dias will follow orders. Planning on seeing him starting Monday as long as PCP will follow and sign. Please call her at [phone]

## 2025-05-12 ENCOUNTER — TELEPHONE (OUTPATIENT)
Dept: PRIMARY CARE | Facility: CLINIC | Age: 75
End: 2025-05-12
Payer: MEDICARE

## 2025-05-12 NOTE — TELEPHONE ENCOUNTER
Message from Corbin at [facility] that they have orders for home care for patient, but per his daughter, he is back in the hospital, so they won't be starting HHC at this time.

## 2025-05-15 ENCOUNTER — APPOINTMENT (OUTPATIENT)
Dept: PRIMARY CARE | Facility: CLINIC | Age: 75
End: 2025-05-15
Payer: MEDICARE

## 2025-05-15 ENCOUNTER — APPOINTMENT (OUTPATIENT)
Dept: NEPHROLOGY | Facility: CLINIC | Age: 75
End: 2025-05-15
Payer: MEDICARE